# Patient Record
Sex: MALE | Race: WHITE | Employment: FULL TIME | URBAN - NONMETROPOLITAN AREA
[De-identification: names, ages, dates, MRNs, and addresses within clinical notes are randomized per-mention and may not be internally consistent; named-entity substitution may affect disease eponyms.]

---

## 2017-05-22 ENCOUNTER — OFFICE VISIT (OUTPATIENT)
Dept: FAMILY MEDICINE CLINIC | Age: 62
End: 2017-05-22

## 2017-05-22 VITALS
SYSTOLIC BLOOD PRESSURE: 124 MMHG | WEIGHT: 146 LBS | HEIGHT: 65 IN | DIASTOLIC BLOOD PRESSURE: 86 MMHG | OXYGEN SATURATION: 96 % | HEART RATE: 60 BPM | BODY MASS INDEX: 24.32 KG/M2

## 2017-05-22 DIAGNOSIS — Z11.59 NEED FOR HEPATITIS C SCREENING TEST: ICD-10-CM

## 2017-05-22 DIAGNOSIS — I10 ESSENTIAL HYPERTENSION: Primary | ICD-10-CM

## 2017-05-22 DIAGNOSIS — E78.5 HYPERLIPIDEMIA LDL GOAL <130: ICD-10-CM

## 2017-05-22 PROCEDURE — 36415 COLL VENOUS BLD VENIPUNCTURE: CPT | Performed by: FAMILY MEDICINE

## 2017-05-22 PROCEDURE — 99213 OFFICE O/P EST LOW 20 MIN: CPT | Performed by: FAMILY MEDICINE

## 2017-05-22 RX ORDER — HYDROCHLOROTHIAZIDE 25 MG/1
TABLET ORAL
Qty: 90 TABLET | Refills: 1 | Status: SHIPPED | OUTPATIENT
Start: 2017-05-22 | End: 2017-11-17 | Stop reason: SDUPTHER

## 2017-05-22 RX ORDER — PRAVASTATIN SODIUM 40 MG
TABLET ORAL
Qty: 90 TABLET | Refills: 1 | Status: SHIPPED | OUTPATIENT
Start: 2017-05-22 | End: 2017-11-17 | Stop reason: SDUPTHER

## 2017-05-22 ASSESSMENT — PATIENT HEALTH QUESTIONNAIRE - PHQ9
2. FEELING DOWN, DEPRESSED OR HOPELESS: 0
SUM OF ALL RESPONSES TO PHQ QUESTIONS 1-9: 0
SUM OF ALL RESPONSES TO PHQ9 QUESTIONS 1 & 2: 0
1. LITTLE INTEREST OR PLEASURE IN DOING THINGS: 0

## 2017-05-22 ASSESSMENT — ENCOUNTER SYMPTOMS
RESPIRATORY NEGATIVE: 1
GASTROINTESTINAL NEGATIVE: 1

## 2017-05-23 LAB
A/G RATIO: 1.8 (ref 1.1–2.2)
ALBUMIN SERPL-MCNC: 4.6 G/DL (ref 3.4–5)
ALP BLD-CCNC: 70 U/L (ref 40–129)
ALT SERPL-CCNC: 23 U/L (ref 10–40)
ANION GAP SERPL CALCULATED.3IONS-SCNC: 16 MMOL/L (ref 3–16)
AST SERPL-CCNC: 20 U/L (ref 15–37)
BILIRUB SERPL-MCNC: 0.3 MG/DL (ref 0–1)
BUN BLDV-MCNC: 20 MG/DL (ref 7–20)
CALCIUM SERPL-MCNC: 9.9 MG/DL (ref 8.3–10.6)
CHLORIDE BLD-SCNC: 102 MMOL/L (ref 99–110)
CHOLESTEROL, TOTAL: 157 MG/DL (ref 0–199)
CO2: 26 MMOL/L (ref 21–32)
CREAT SERPL-MCNC: 0.8 MG/DL (ref 0.8–1.3)
GFR AFRICAN AMERICAN: >60
GFR NON-AFRICAN AMERICAN: >60
GLOBULIN: 2.5 G/DL
GLUCOSE BLD-MCNC: 92 MG/DL (ref 70–99)
HDLC SERPL-MCNC: 47 MG/DL (ref 40–60)
HEPATITIS C ANTIBODY INTERPRETATION: NORMAL
LDL CHOLESTEROL CALCULATED: 93 MG/DL
POTASSIUM SERPL-SCNC: 4.9 MMOL/L (ref 3.5–5.1)
SODIUM BLD-SCNC: 144 MMOL/L (ref 136–145)
TOTAL PROTEIN: 7.1 G/DL (ref 6.4–8.2)
TRIGL SERPL-MCNC: 84 MG/DL (ref 0–150)
VLDLC SERPL CALC-MCNC: 17 MG/DL

## 2017-09-05 ENCOUNTER — OFFICE VISIT (OUTPATIENT)
Dept: FAMILY MEDICINE CLINIC | Age: 62
End: 2017-09-05

## 2017-09-05 VITALS
TEMPERATURE: 97.1 F | OXYGEN SATURATION: 96 % | WEIGHT: 148 LBS | SYSTOLIC BLOOD PRESSURE: 108 MMHG | DIASTOLIC BLOOD PRESSURE: 64 MMHG | HEIGHT: 65 IN | BODY MASS INDEX: 24.66 KG/M2 | HEART RATE: 70 BPM

## 2017-09-05 DIAGNOSIS — L03.113 CELLULITIS OF RIGHT UPPER EXTREMITY: Primary | ICD-10-CM

## 2017-09-05 PROCEDURE — 99213 OFFICE O/P EST LOW 20 MIN: CPT | Performed by: FAMILY MEDICINE

## 2017-09-05 PROCEDURE — 96372 THER/PROPH/DIAG INJ SC/IM: CPT | Performed by: FAMILY MEDICINE

## 2017-09-05 RX ORDER — CEFTRIAXONE SODIUM 250 MG/1
250 INJECTION, POWDER, FOR SOLUTION INTRAMUSCULAR; INTRAVENOUS ONCE
Status: COMPLETED | OUTPATIENT
Start: 2017-09-05 | End: 2017-09-05

## 2017-09-05 RX ORDER — CEPHALEXIN 500 MG/1
500 CAPSULE ORAL 4 TIMES DAILY
Qty: 28 CAPSULE | Refills: 0 | Status: SHIPPED | OUTPATIENT
Start: 2017-09-05 | End: 2017-09-26 | Stop reason: ALTCHOICE

## 2017-09-05 RX ADMIN — CEFTRIAXONE SODIUM 250 MG: 250 INJECTION, POWDER, FOR SOLUTION INTRAMUSCULAR; INTRAVENOUS at 18:49

## 2017-09-26 ENCOUNTER — OFFICE VISIT (OUTPATIENT)
Dept: FAMILY MEDICINE CLINIC | Age: 62
End: 2017-09-26

## 2017-09-26 VITALS
HEIGHT: 65 IN | BODY MASS INDEX: 24.66 KG/M2 | SYSTOLIC BLOOD PRESSURE: 108 MMHG | HEART RATE: 76 BPM | DIASTOLIC BLOOD PRESSURE: 60 MMHG | TEMPERATURE: 98.1 F | WEIGHT: 148 LBS | OXYGEN SATURATION: 96 %

## 2017-09-26 DIAGNOSIS — L03.211 CELLULITIS OF FACE: Primary | ICD-10-CM

## 2017-09-26 PROCEDURE — 99213 OFFICE O/P EST LOW 20 MIN: CPT | Performed by: FAMILY MEDICINE

## 2017-09-26 RX ORDER — AMOXICILLIN AND CLAVULANATE POTASSIUM 875; 125 MG/1; MG/1
1 TABLET, FILM COATED ORAL 2 TIMES DAILY
Qty: 14 TABLET | Refills: 0 | Status: SHIPPED | OUTPATIENT
Start: 2017-09-26 | End: 2017-10-03

## 2017-09-26 ASSESSMENT — ENCOUNTER SYMPTOMS
SORE THROAT: 0
RESPIRATORY NEGATIVE: 1
EYE REDNESS: 1

## 2017-11-17 ENCOUNTER — OFFICE VISIT (OUTPATIENT)
Dept: FAMILY MEDICINE CLINIC | Age: 62
End: 2017-11-17

## 2017-11-17 VITALS
HEIGHT: 65 IN | SYSTOLIC BLOOD PRESSURE: 130 MMHG | TEMPERATURE: 97.3 F | WEIGHT: 148.6 LBS | DIASTOLIC BLOOD PRESSURE: 80 MMHG | OXYGEN SATURATION: 91 % | HEART RATE: 31 BPM | BODY MASS INDEX: 24.76 KG/M2

## 2017-11-17 DIAGNOSIS — Z23 NEED FOR SHINGLES VACCINE: ICD-10-CM

## 2017-11-17 DIAGNOSIS — Z23 NEED FOR INFLUENZA VACCINATION: ICD-10-CM

## 2017-11-17 DIAGNOSIS — E78.5 HYPERLIPIDEMIA LDL GOAL <130: ICD-10-CM

## 2017-11-17 DIAGNOSIS — I10 ESSENTIAL HYPERTENSION: Primary | ICD-10-CM

## 2017-11-17 PROCEDURE — 90471 IMMUNIZATION ADMIN: CPT | Performed by: FAMILY MEDICINE

## 2017-11-17 PROCEDURE — 99213 OFFICE O/P EST LOW 20 MIN: CPT | Performed by: FAMILY MEDICINE

## 2017-11-17 PROCEDURE — 90688 IIV4 VACCINE SPLT 0.5 ML IM: CPT | Performed by: FAMILY MEDICINE

## 2017-11-17 RX ORDER — HYDROCHLOROTHIAZIDE 25 MG/1
TABLET ORAL
Qty: 90 TABLET | Refills: 1 | Status: SHIPPED | OUTPATIENT
Start: 2017-11-17 | End: 2018-04-13 | Stop reason: SDUPTHER

## 2017-11-17 RX ORDER — PRAVASTATIN SODIUM 40 MG
TABLET ORAL
Qty: 90 TABLET | Refills: 1 | Status: SHIPPED | OUTPATIENT
Start: 2017-11-17 | End: 2018-04-13 | Stop reason: SDUPTHER

## 2017-11-17 ASSESSMENT — ENCOUNTER SYMPTOMS: GASTROINTESTINAL NEGATIVE: 1

## 2017-11-17 NOTE — PROGRESS NOTES
Subjective:      Patient ID: Chris Estrada is a 58 y.o. male. HPI  Chief Complaint   Patient presents with    Hypertension-Denies cv/cns/jorge sx      6 month f/u     Hyperlipidemia-no problem with compliance;no myalgias;      fasting except coffee     Unaccompanied for routine follow-up. Review of Systems   Constitutional: Negative. Cardiovascular: Negative. Gastrointestinal: Negative. Genitourinary: Negative. History of prostate cancer       Objective:   Physical Exam   Constitutional: He appears well-developed and well-nourished. Neck: Neck supple. Carotid bruit is not present. Cardiovascular: Normal rate, regular rhythm and normal heart sounds. Pulmonary/Chest: Effort normal. He has wheezes. Lymphadenopathy:     He has no cervical adenopathy. Nursing note and vitals reviewed. /80   Pulse (!) 31   Temp 97.3 °F (36.3 °C) (Oral)   Ht 5' 5\" (1.651 m)   Wt 148 lb 9.6 oz (67.4 kg)   SpO2 91%   BMI 24.73 kg/m²     Assessment:      Ant Metcalf was seen today for hypertension and hyperlipidemia. Diagnoses and all orders for this visit:    Essential hypertension  -     hydrochlorothiazide (HYDRODIURIL) 25 MG tablet; TAKE 1 TABLET DAILY    Hyperlipidemia LDL goal <130  -     pravastatin (PRAVACHOL) 40 MG tablet; TAKE 1 TABLET DAILY    Need for influenza vaccination  -     INFLUENZA, QUADV, 3 YRS AND OLDER, IM, MDV, 0.5ML (FLUZONE QUADV)    Need for shingles vaccine  -     zoster vaccine live, PF, (ZOSTAVAX) 37259 UNT/0.65ML injection; Inject 0.65 mLs into the skin once for 1 dose           Plan:      Return in about 6 months (around 5/17/2018). \  There are no Patient Instructions on file for this visit.

## 2018-03-15 ENCOUNTER — HOSPITAL ENCOUNTER (OUTPATIENT)
Dept: OTHER | Age: 63
Discharge: HOME | End: 2018-03-15
Attending: UROLOGY | Admitting: UROLOGY

## 2018-03-15 LAB — PROSTATE SPECIFIC ANTIGEN: <0.01 NG/ML (ref 0–4)

## 2018-04-13 ENCOUNTER — OFFICE VISIT (OUTPATIENT)
Dept: FAMILY MEDICINE CLINIC | Age: 63
End: 2018-04-13

## 2018-04-13 VITALS
WEIGHT: 148.2 LBS | BODY MASS INDEX: 24.69 KG/M2 | SYSTOLIC BLOOD PRESSURE: 130 MMHG | HEART RATE: 60 BPM | TEMPERATURE: 98.5 F | DIASTOLIC BLOOD PRESSURE: 70 MMHG | HEIGHT: 65 IN | OXYGEN SATURATION: 94 %

## 2018-04-13 DIAGNOSIS — E78.5 HYPERLIPIDEMIA LDL GOAL <130: ICD-10-CM

## 2018-04-13 DIAGNOSIS — I10 ESSENTIAL HYPERTENSION: Primary | ICD-10-CM

## 2018-04-13 PROCEDURE — 36415 COLL VENOUS BLD VENIPUNCTURE: CPT | Performed by: FAMILY MEDICINE

## 2018-04-13 PROCEDURE — 99213 OFFICE O/P EST LOW 20 MIN: CPT | Performed by: FAMILY MEDICINE

## 2018-04-13 RX ORDER — PRAVASTATIN SODIUM 40 MG
TABLET ORAL
Qty: 90 TABLET | Refills: 1 | Status: SHIPPED | OUTPATIENT
Start: 2018-04-13 | End: 2018-09-28 | Stop reason: SDUPTHER

## 2018-04-13 RX ORDER — HYDROCHLOROTHIAZIDE 25 MG/1
TABLET ORAL
Qty: 90 TABLET | Refills: 1 | Status: SHIPPED | OUTPATIENT
Start: 2018-04-13 | End: 2018-09-28 | Stop reason: SDUPTHER

## 2018-04-13 ASSESSMENT — ENCOUNTER SYMPTOMS: RESPIRATORY NEGATIVE: 1

## 2018-04-14 LAB
A/G RATIO: 2 (ref 1.1–2.2)
ALBUMIN SERPL-MCNC: 4.7 G/DL (ref 3.4–5)
ALP BLD-CCNC: 83 U/L (ref 40–129)
ALT SERPL-CCNC: 28 U/L (ref 10–40)
ANION GAP SERPL CALCULATED.3IONS-SCNC: 13 MMOL/L (ref 3–16)
AST SERPL-CCNC: 21 U/L (ref 15–37)
BILIRUB SERPL-MCNC: 0.3 MG/DL (ref 0–1)
BUN BLDV-MCNC: 23 MG/DL (ref 7–20)
CALCIUM SERPL-MCNC: 9.6 MG/DL (ref 8.3–10.6)
CHLORIDE BLD-SCNC: 99 MMOL/L (ref 99–110)
CHOLESTEROL, TOTAL: 139 MG/DL (ref 0–199)
CO2: 28 MMOL/L (ref 21–32)
CREAT SERPL-MCNC: 0.9 MG/DL (ref 0.8–1.3)
GFR AFRICAN AMERICAN: >60
GFR NON-AFRICAN AMERICAN: >60
GLOBULIN: 2.4 G/DL
GLUCOSE BLD-MCNC: 103 MG/DL (ref 70–99)
HDLC SERPL-MCNC: 44 MG/DL (ref 40–60)
LDL CHOLESTEROL CALCULATED: 73 MG/DL
POTASSIUM SERPL-SCNC: 4.5 MMOL/L (ref 3.5–5.1)
SODIUM BLD-SCNC: 140 MMOL/L (ref 136–145)
TOTAL PROTEIN: 7.1 G/DL (ref 6.4–8.2)
TRIGL SERPL-MCNC: 108 MG/DL (ref 0–150)
VLDLC SERPL CALC-MCNC: 22 MG/DL

## 2018-05-21 DIAGNOSIS — Z12.11 SCREENING FOR COLON CANCER: Primary | ICD-10-CM

## 2018-09-28 ENCOUNTER — OFFICE VISIT (OUTPATIENT)
Dept: FAMILY MEDICINE CLINIC | Age: 63
End: 2018-09-28
Payer: COMMERCIAL

## 2018-09-28 VITALS
WEIGHT: 147.4 LBS | DIASTOLIC BLOOD PRESSURE: 80 MMHG | TEMPERATURE: 98.4 F | BODY MASS INDEX: 24.53 KG/M2 | SYSTOLIC BLOOD PRESSURE: 124 MMHG | OXYGEN SATURATION: 96 % | HEART RATE: 65 BPM

## 2018-09-28 DIAGNOSIS — E78.5 HYPERLIPIDEMIA LDL GOAL <130: ICD-10-CM

## 2018-09-28 DIAGNOSIS — I10 ESSENTIAL HYPERTENSION: ICD-10-CM

## 2018-09-28 DIAGNOSIS — Z23 NEED FOR INFLUENZA VACCINATION: Primary | ICD-10-CM

## 2018-09-28 PROCEDURE — 90682 RIV4 VACC RECOMBINANT DNA IM: CPT | Performed by: FAMILY MEDICINE

## 2018-09-28 PROCEDURE — 90471 IMMUNIZATION ADMIN: CPT | Performed by: FAMILY MEDICINE

## 2018-09-28 PROCEDURE — 99213 OFFICE O/P EST LOW 20 MIN: CPT | Performed by: FAMILY MEDICINE

## 2018-09-28 RX ORDER — PRAVASTATIN SODIUM 40 MG
TABLET ORAL
Qty: 90 TABLET | Refills: 1 | Status: SHIPPED | OUTPATIENT
Start: 2018-09-28 | End: 2019-03-22 | Stop reason: SDUPTHER

## 2018-09-28 RX ORDER — HYDROCHLOROTHIAZIDE 25 MG/1
TABLET ORAL
Qty: 90 TABLET | Refills: 1 | Status: SHIPPED | OUTPATIENT
Start: 2018-09-28 | End: 2019-03-22 | Stop reason: SDUPTHER

## 2018-09-28 ASSESSMENT — PATIENT HEALTH QUESTIONNAIRE - PHQ9
SUM OF ALL RESPONSES TO PHQ QUESTIONS 1-9: 0
2. FEELING DOWN, DEPRESSED OR HOPELESS: 0
SUM OF ALL RESPONSES TO PHQ9 QUESTIONS 1 & 2: 0
SUM OF ALL RESPONSES TO PHQ QUESTIONS 1-9: 0
1. LITTLE INTEREST OR PLEASURE IN DOING THINGS: 0

## 2018-09-28 ASSESSMENT — ENCOUNTER SYMPTOMS: RESPIRATORY NEGATIVE: 1

## 2018-09-28 NOTE — PROGRESS NOTES
Vaccine Information Sheet, \"Influenza - Inactivated\"  given to [place] , or parent/legal guardian of  [place]  and verbalized understanding. Patient responses:    Have you ever had a reaction to a flu vaccine? No  Are you able to eat eggs without adverse effects? Yes  Do you have any current illness? No  Have you ever had Guillian [place] Syndrome? No    Flu vaccine given per order. Please see immunization tab.

## 2018-09-28 NOTE — PROGRESS NOTES
Subjective:      Patient ID: Flower Becker is a 61 y.o. male. HPI  Chief Complaint   Patient presents with    Hyperlipidemia-no problem with compliance;no myalgias;      is fasting     Hypertension= Denies cv/cns/jorge sx      6 month f/u     Flu Vaccine     Chief complaint and present illness: 77-year-old white male presents unaccompanied for routine follow-up. Doing well     Review of Systems   Constitutional: Negative. Respiratory: Negative. background/entire past medical,social and family history obtained and reviewed/updated today   Objective:   Physical Exam   Constitutional: He appears well-developed and well-nourished. Neck: Neck supple. Carotid bruit is not present. Cardiovascular: Normal rate, regular rhythm and normal heart sounds. Pulmonary/Chest: Effort normal. He has no wheezes. Lymphadenopathy:     He has no cervical adenopathy. Nursing note and vitals reviewed. /80   Pulse 65   Temp 98.4 °F (36.9 °C) (Oral)   Wt 147 lb 6.4 oz (66.9 kg)   SpO2 96%   BMI 24.53 kg/m²     Assessment:      Macho Churchill was seen today for hyperlipidemia, hypertension and flu vaccine. Diagnoses and all orders for this visit:    Need for influenza vaccination  -     INFLUENZA, QUADV, RECOMBINANT, 18 YRS AND OLDER, IM, PF, PREFILL SYR OR SDV, 0.5ML (FLUBLOK QUADV, PF)    Hyperlipidemia LDL goal <130  -     pravastatin (PRAVACHOL) 40 MG tablet; TAKE 1 TABLET DAILY    Essential hypertension  -     hydrochlorothiazide (HYDRODIURIL) 25 MG tablet; TAKE 1 TABLET DAILY           Plan:      Return in about 6 months (around 3/28/2019). There are no Patient Instructions on file for this visit.          Shyann Boyer

## 2019-03-22 ENCOUNTER — TELEPHONE (OUTPATIENT)
Dept: FAMILY MEDICINE CLINIC | Age: 64
End: 2019-03-22

## 2019-03-22 ENCOUNTER — OFFICE VISIT (OUTPATIENT)
Dept: FAMILY MEDICINE CLINIC | Age: 64
End: 2019-03-22
Payer: COMMERCIAL

## 2019-03-22 VITALS
OXYGEN SATURATION: 96 % | DIASTOLIC BLOOD PRESSURE: 74 MMHG | WEIGHT: 145 LBS | SYSTOLIC BLOOD PRESSURE: 122 MMHG | HEART RATE: 50 BPM | BODY MASS INDEX: 24.13 KG/M2

## 2019-03-22 DIAGNOSIS — E78.5 HYPERLIPIDEMIA LDL GOAL <130: ICD-10-CM

## 2019-03-22 DIAGNOSIS — I10 ESSENTIAL HYPERTENSION: ICD-10-CM

## 2019-03-22 DIAGNOSIS — I49.9 CARDIAC ARRHYTHMIA, UNSPECIFIED CARDIAC ARRHYTHMIA TYPE: Primary | ICD-10-CM

## 2019-03-22 LAB
A/G RATIO: 1.7 (ref 1.1–2.2)
ALBUMIN SERPL-MCNC: 4.2 G/DL (ref 3.4–5)
ALP BLD-CCNC: 81 U/L (ref 40–129)
ALT SERPL-CCNC: 29 U/L (ref 10–40)
ANION GAP SERPL CALCULATED.3IONS-SCNC: 11 MMOL/L (ref 3–16)
AST SERPL-CCNC: 28 U/L (ref 15–37)
BILIRUB SERPL-MCNC: 0.4 MG/DL (ref 0–1)
BUN BLDV-MCNC: 13 MG/DL (ref 7–20)
CALCIUM SERPL-MCNC: 9.7 MG/DL (ref 8.3–10.6)
CHLORIDE BLD-SCNC: 104 MMOL/L (ref 99–110)
CHOLESTEROL, TOTAL: 109 MG/DL (ref 0–199)
CO2: 27 MMOL/L (ref 21–32)
CREAT SERPL-MCNC: 0.8 MG/DL (ref 0.8–1.3)
GFR AFRICAN AMERICAN: >60
GFR NON-AFRICAN AMERICAN: >60
GLOBULIN: 2.5 G/DL
GLUCOSE BLD-MCNC: 115 MG/DL (ref 70–99)
HDLC SERPL-MCNC: 43 MG/DL (ref 40–60)
LDL CHOLESTEROL CALCULATED: 51 MG/DL
MAGNESIUM: 2.1 MG/DL (ref 1.8–2.4)
POTASSIUM SERPL-SCNC: 4.8 MMOL/L (ref 3.5–5.1)
SODIUM BLD-SCNC: 142 MMOL/L (ref 136–145)
TOTAL PROTEIN: 6.7 G/DL (ref 6.4–8.2)
TRIGL SERPL-MCNC: 73 MG/DL (ref 0–150)
VLDLC SERPL CALC-MCNC: 15 MG/DL

## 2019-03-22 PROCEDURE — 99214 OFFICE O/P EST MOD 30 MIN: CPT | Performed by: FAMILY MEDICINE

## 2019-03-22 PROCEDURE — 93000 ELECTROCARDIOGRAM COMPLETE: CPT | Performed by: FAMILY MEDICINE

## 2019-03-22 RX ORDER — HYDROCHLOROTHIAZIDE 25 MG/1
TABLET ORAL
Qty: 90 TABLET | Refills: 1 | Status: SHIPPED | OUTPATIENT
Start: 2019-03-22 | End: 2019-09-19 | Stop reason: SDUPTHER

## 2019-03-22 RX ORDER — PRAVASTATIN SODIUM 40 MG
TABLET ORAL
Qty: 90 TABLET | Refills: 1 | Status: SHIPPED | OUTPATIENT
Start: 2019-03-22 | End: 2019-09-19 | Stop reason: SDUPTHER

## 2019-03-22 ASSESSMENT — PATIENT HEALTH QUESTIONNAIRE - PHQ9
SUM OF ALL RESPONSES TO PHQ QUESTIONS 1-9: 0
1. LITTLE INTEREST OR PLEASURE IN DOING THINGS: 0
SUM OF ALL RESPONSES TO PHQ QUESTIONS 1-9: 0
SUM OF ALL RESPONSES TO PHQ9 QUESTIONS 1 & 2: 0
2. FEELING DOWN, DEPRESSED OR HOPELESS: 0

## 2019-03-22 ASSESSMENT — ENCOUNTER SYMPTOMS: RESPIRATORY NEGATIVE: 1

## 2019-03-25 ENCOUNTER — OFFICE VISIT (OUTPATIENT)
Dept: CARDIOLOGY CLINIC | Age: 64
End: 2019-03-25
Payer: COMMERCIAL

## 2019-03-25 ENCOUNTER — HOSPITAL ENCOUNTER (OUTPATIENT)
Age: 64
Discharge: HOME | End: 2019-03-25
Payer: COMMERCIAL

## 2019-03-25 VITALS
WEIGHT: 142.4 LBS | BODY MASS INDEX: 23.72 KG/M2 | OXYGEN SATURATION: 95 % | HEART RATE: 78 BPM | HEIGHT: 65 IN | SYSTOLIC BLOOD PRESSURE: 120 MMHG | DIASTOLIC BLOOD PRESSURE: 60 MMHG

## 2019-03-25 DIAGNOSIS — I49.9 IRREGULAR HEART RATE: ICD-10-CM

## 2019-03-25 DIAGNOSIS — I49.3 PVC (PREMATURE VENTRICULAR CONTRACTION): Primary | ICD-10-CM

## 2019-03-25 DIAGNOSIS — I49.3 PVC (PREMATURE VENTRICULAR CONTRACTION): ICD-10-CM

## 2019-03-25 LAB — TSH REFLEX FT4: 0.38 UIU/ML (ref 0.27–4.2)

## 2019-03-25 PROCEDURE — 93000 ELECTROCARDIOGRAM COMPLETE: CPT | Performed by: INTERNAL MEDICINE

## 2019-03-25 PROCEDURE — 99203 OFFICE O/P NEW LOW 30 MIN: CPT | Performed by: INTERNAL MEDICINE

## 2019-03-25 PROCEDURE — 84443 ASSAY THYROID STIM HORMONE: CPT

## 2019-03-25 PROCEDURE — 36415 COLL VENOUS BLD VENIPUNCTURE: CPT

## 2019-03-25 RX ORDER — ASPIRIN 325 MG
325 TABLET ORAL DAILY
COMMUNITY

## 2019-03-29 ENCOUNTER — TELEPHONE (OUTPATIENT)
Dept: CARDIOLOGY CLINIC | Age: 64
End: 2019-03-29

## 2019-04-03 ENCOUNTER — TELEPHONE (OUTPATIENT)
Dept: CARDIOLOGY CLINIC | Age: 64
End: 2019-04-03

## 2019-04-08 PROCEDURE — 93224 XTRNL ECG REC UP TO 48 HRS: CPT | Performed by: INTERNAL MEDICINE

## 2019-04-09 ENCOUNTER — HOSPITAL ENCOUNTER (OUTPATIENT)
Dept: NON INVASIVE DIAGNOSTICS | Age: 64
Discharge: HOME | End: 2019-04-09
Payer: COMMERCIAL

## 2019-04-09 DIAGNOSIS — I49.3 PVC (PREMATURE VENTRICULAR CONTRACTION): ICD-10-CM

## 2019-04-09 LAB
LV EF: 48 %
LVEF MODALITY: NORMAL

## 2019-04-09 PROCEDURE — 93306 TTE W/DOPPLER COMPLETE: CPT

## 2019-04-11 ENCOUNTER — TELEPHONE (OUTPATIENT)
Dept: CARDIOLOGY CLINIC | Age: 64
End: 2019-04-11

## 2019-04-11 RX ORDER — NADOLOL 20 MG/1
20 TABLET ORAL 2 TIMES DAILY
Qty: 60 TABLET | Refills: 5 | Status: SHIPPED | OUTPATIENT
Start: 2019-04-11 | End: 2019-11-15 | Stop reason: SDUPTHER

## 2019-04-11 NOTE — TELEPHONE ENCOUNTER
----- Message from Danita Millan MD sent at 4/11/2019  4:41 PM EDT -----  Shireen Bufysannika to call patient, gave results. States no CP. Feels fine. Please call pt and schedule for cardiac cath to evaluate further.  I already went over R,B,Aes with pt

## 2019-04-11 NOTE — TELEPHONE ENCOUNTER
Spoke with Costa Dye, relayed message per Raissa Guzman. Pt verbalized understanding and will schedule cath. Keyonna please call and set up cath. Thanks.

## 2019-04-12 NOTE — TELEPHONE ENCOUNTER
Left message for patient to call us back. Patient can hold the HCTZ if this makes him use the bathroom more than normal.  If it does not effect him, he does not have to hold it. He may take all other meds with a sip of water as well.

## 2019-04-19 DIAGNOSIS — I49.3 PVC (PREMATURE VENTRICULAR CONTRACTION): ICD-10-CM

## 2019-04-23 ENCOUNTER — HOSPITAL ENCOUNTER (OUTPATIENT)
Dept: CARDIAC CATH/INVASIVE PROCEDURES | Age: 64
Discharge: HOME | End: 2019-04-23
Attending: INTERNAL MEDICINE | Admitting: INTERNAL MEDICINE
Payer: COMMERCIAL

## 2019-04-23 VITALS — HEIGHT: 65 IN | WEIGHT: 142 LBS | BODY MASS INDEX: 23.66 KG/M2

## 2019-04-23 LAB
ALBUMIN SERPL-MCNC: 4.6 G/DL (ref 3.4–5)
ANION GAP SERPL CALCULATED.3IONS-SCNC: 11 MMOL/L (ref 3–16)
BUN BLDV-MCNC: 21 MG/DL (ref 7–20)
CALCIUM SERPL-MCNC: 9.8 MG/DL (ref 8.3–10.6)
CHLORIDE BLD-SCNC: 102 MMOL/L (ref 99–110)
CHOLESTEROL, FASTING: 109 MG/DL (ref 0–199)
CO2: 29 MMOL/L (ref 21–32)
CREAT SERPL-MCNC: 0.8 MG/DL (ref 0.8–1.3)
EKG ATRIAL RATE: 50 BPM
EKG DIAGNOSIS: NORMAL
EKG P AXIS: 65 DEGREES
EKG P-R INTERVAL: 142 MS
EKG Q-T INTERVAL: 430 MS
EKG QRS DURATION: 100 MS
EKG QTC CALCULATION (BAZETT): 392 MS
EKG R AXIS: 73 DEGREES
EKG T AXIS: 49 DEGREES
EKG VENTRICULAR RATE: 50 BPM
GFR AFRICAN AMERICAN: >60
GFR NON-AFRICAN AMERICAN: >60
GLUCOSE BLD-MCNC: 115 MG/DL (ref 70–99)
HCT VFR BLD CALC: 44.4 % (ref 40.5–52.5)
HDLC SERPL-MCNC: 35 MG/DL (ref 40–60)
HEMOGLOBIN: 15.3 G/DL (ref 13.5–17.5)
INR BLD: 1.08 (ref 0.86–1.14)
LDL CHOLESTEROL CALCULATED: 57 MG/DL
MCH RBC QN AUTO: 32 PG (ref 26–34)
MCHC RBC AUTO-ENTMCNC: 34.4 G/DL (ref 31–36)
MCV RBC AUTO: 93.2 FL (ref 80–100)
PDW BLD-RTO: 13.7 % (ref 12.4–15.4)
PHOSPHORUS: 4.8 MG/DL (ref 2.5–4.9)
PLATELET # BLD: 271 K/UL (ref 135–450)
PMV BLD AUTO: 7.4 FL (ref 5–10.5)
POTASSIUM SERPL-SCNC: 4.1 MMOL/L (ref 3.5–5.1)
PROTHROMBIN TIME: 12.3 SEC (ref 9.8–13)
RBC # BLD: 4.76 M/UL (ref 4.2–5.9)
SODIUM BLD-SCNC: 142 MMOL/L (ref 136–145)
TRIGLYCERIDE, FASTING: 85 MG/DL (ref 0–150)
VLDLC SERPL CALC-MCNC: 17 MG/DL
WBC # BLD: 6.1 K/UL (ref 4–11)

## 2019-04-23 PROCEDURE — 2580000003 HC RX 258

## 2019-04-23 PROCEDURE — 99153 MOD SED SAME PHYS/QHP EA: CPT

## 2019-04-23 PROCEDURE — 99152 MOD SED SAME PHYS/QHP 5/>YRS: CPT

## 2019-04-23 PROCEDURE — 6360000002 HC RX W HCPCS

## 2019-04-23 PROCEDURE — C1894 INTRO/SHEATH, NON-LASER: HCPCS

## 2019-04-23 PROCEDURE — 80069 RENAL FUNCTION PANEL: CPT

## 2019-04-23 PROCEDURE — C1769 GUIDE WIRE: HCPCS

## 2019-04-23 PROCEDURE — 2709999900 HC NON-CHARGEABLE SUPPLY

## 2019-04-23 PROCEDURE — 99152 MOD SED SAME PHYS/QHP 5/>YRS: CPT | Performed by: INTERNAL MEDICINE

## 2019-04-23 PROCEDURE — 93005 ELECTROCARDIOGRAM TRACING: CPT | Performed by: INTERNAL MEDICINE

## 2019-04-23 PROCEDURE — 6360000004 HC RX CONTRAST MEDICATION

## 2019-04-23 PROCEDURE — 93458 L HRT ARTERY/VENTRICLE ANGIO: CPT

## 2019-04-23 PROCEDURE — 80061 LIPID PANEL: CPT

## 2019-04-23 PROCEDURE — 93010 ELECTROCARDIOGRAM REPORT: CPT | Performed by: INTERNAL MEDICINE

## 2019-04-23 PROCEDURE — 93458 L HRT ARTERY/VENTRICLE ANGIO: CPT | Performed by: INTERNAL MEDICINE

## 2019-04-23 PROCEDURE — C1887 CATHETER, GUIDING: HCPCS

## 2019-04-23 PROCEDURE — 85027 COMPLETE CBC AUTOMATED: CPT

## 2019-04-23 PROCEDURE — 6370000000 HC RX 637 (ALT 250 FOR IP)

## 2019-04-23 PROCEDURE — 99153 MOD SED SAME PHYS/QHP EA: CPT | Performed by: INTERNAL MEDICINE

## 2019-04-23 PROCEDURE — 2500000003 HC RX 250 WO HCPCS

## 2019-04-23 PROCEDURE — 85610 PROTHROMBIN TIME: CPT

## 2019-04-23 RX ORDER — ACETAMINOPHEN 325 MG/1
650 TABLET ORAL ONCE
Status: DISCONTINUED | OUTPATIENT
Start: 2019-04-23 | End: 2019-04-23 | Stop reason: HOSPADM

## 2019-04-23 RX ORDER — SODIUM CHLORIDE 9 MG/ML
INJECTION, SOLUTION INTRAVENOUS ONCE
Status: COMPLETED | OUTPATIENT
Start: 2019-04-23 | End: 2019-04-23

## 2019-04-23 RX ORDER — MIDAZOLAM HYDROCHLORIDE 1 MG/ML
INJECTION INTRAMUSCULAR; INTRAVENOUS
Status: COMPLETED | OUTPATIENT
Start: 2019-04-23 | End: 2019-04-23

## 2019-04-23 RX ORDER — FENTANYL CITRATE 50 UG/ML
INJECTION, SOLUTION INTRAMUSCULAR; INTRAVENOUS
Status: COMPLETED | OUTPATIENT
Start: 2019-04-23 | End: 2019-04-23

## 2019-04-23 RX ORDER — ISOSORBIDE MONONITRATE 30 MG/1
30 TABLET, EXTENDED RELEASE ORAL DAILY
Qty: 30 TABLET | Refills: 3 | Status: SHIPPED | OUTPATIENT
Start: 2019-04-23 | End: 2019-09-12 | Stop reason: SDUPTHER

## 2019-04-23 RX ADMIN — MIDAZOLAM HYDROCHLORIDE 2 MG: 1 INJECTION INTRAMUSCULAR; INTRAVENOUS at 13:52

## 2019-04-23 RX ADMIN — SODIUM CHLORIDE: 9 INJECTION, SOLUTION INTRAVENOUS at 11:00

## 2019-04-23 RX ADMIN — FENTANYL CITRATE 25 MCG: 50 INJECTION, SOLUTION INTRAMUSCULAR; INTRAVENOUS at 13:52

## 2019-04-23 NOTE — BRIEF OP NOTE
Brief Postoperative Note  ______________________________________________________________    Patient: Phuong Dave  YOB: 1955  MRN: [id number]      Brief Postoperative Note  Pre-operative Diagnosis: PVC, abnormal echo  Post-operative Diagnosis: Same  Procedure:   LHC  LVG  Cors  Anesthesia: Moderate Sedation  Surgeons/Assistants: Yolanda Ennis MD, Nell Escobar  Estimated Blood Loss: less than 50   Complications: None  Specimens: Was Not Obtained    Findings:     LEFT HEART CATH  LM: no angiographic CAD  LAD: luminals  LCX: No angiographic CAD, JUSTIN-2 flow   OM-1 large, branching  RCA: dominant, prox/mid luminals. Justin-2 flow (5 beats to get flow distally, very slow to clear dye)    LVEDP: 10  LVEF: 55%      Assessment  1. Mild nonobstructive CAD  2. Cardiac microvascular disease as above with very poor flow in RCA and LAD. - medical management  3. QUIT smoking!             Alexandro Wiley MD  Date: 4/23/2019  Time: 1:54 PM

## 2019-04-23 NOTE — H&P
Brief Pre-Op Note/Sedation Assessment      Phuong Dave  1955  Cath Pool Rm/NONE  [id number]  12:28 PM    Planned Procedure: Cardiac Catheterization Procedure    Post Procedure Plan: Return to same level of care    Consent: I have discussed with the patient and/or the patient representative the indication, alternatives, and the possible risks and/or complications of the planned procedure and the anesthesia methods. The patient and/or patient representative appear to understand and agree to proceed. Chief Complaint: Anginal Equivalent      Indications for the Procedure:   CAD Presentation:  Cardiac Arrythmia  Anginal Classification within 2 weeks:  CCS II - Slight limitation, with angina only during vigorous physical activity  NYHA Heart Failure Class within 2 weeks: Class III - Symptoms of HF on less-than-ordinary exertion, Newly Diagnosed? Yes, Heart Failure Type: Systolic      Anti- Anginal Meds within 2 weeks:   ANTI-ANGINAL MEDS: Yes: Beta Blockers, Aspirin and Statin (Any)      Stress or Imaging Studies Performed:  None    Vital Signs:  Ht 5' 5\" (1.651 m)   Wt 142 lb (64.4 kg)   BMI 23.63 kg/m²     Allergies:  No Known Allergies    Past Medical History:  Past Medical History:   Diagnosis Date    Hyperlipidemia     Hypertension     Prostate cancer ([facility] [address] 2012    total prostatectomy,DrBey    Smoker          Surgical History:  Past Surgical History:   Procedure Laterality Date    COLONOSCOPY  3/22/12    EYE SURGERY  1989    eye trauma os with fixed pupil;vision is ok;    PROSTATE BIOPSY      PROSTATECTOMY  02/14/2012         Medications:  Current Facility-Administered Medications   Medication Dose Route Frequency Provider Last Rate Last Dose    0.9 % sodium chloride infusion   Intravenous Once Yadira MD Charley               Pre-Sedation:    Pre-Sedation Documentation and Exam:  I have assessed the patient and agree with the H&P present on the chart.     Prior History of Anesthesia Complications:   none    Modified Mallampati:  II (soft palate, uvula, fauces visible)    ASA Classification:  Class 2 -- A normal healthy patient with mild systemic disease    Jair Scale: Activity:  2 - Able to move 4 extremities voluntarily on command  Respiration:  2 - Able to breathe deeply and cough freely  Circulation:  2 - BP+/- 20mmHg of normal  Consciousness:  2 - Fully awake  Oxygen Saturation (color):  2 - Able to maintain oxygen saturation >92% on room air    Sedation/Anesthesia Plan:  Guard the patient's safety and welfare. Minimize physical discomfort and pain. Minimize negative psychological responses to treatment by providing sedation and analgesia and maximize the potential amnesia. Patient to meet pre-procedure discharge plan.     Medication Planned:  midazolam intravenously, fentanyl intravenously    Patient is an appropriate candidate for plan of sedation: yes      Electronically signed by Sona Ibanez MD on 4/23/2019 at 12:28 PM

## 2019-04-29 ENCOUNTER — TELEPHONE (OUTPATIENT)
Dept: CARDIOLOGY CLINIC | Age: 64
End: 2019-04-29

## 2019-04-29 NOTE — TELEPHONE ENCOUNTER
Spoke with patient. He states he believes his Imdur may be causing the flushing and tingling. He states he has been on niacin for \"years\" and has never had any trouble until starting the Imdur. Will let Dr. Angel Hargrove know.

## 2019-05-15 NOTE — PROGRESS NOTES
by mouth daily 30 tablet 3    nadolol (CORGARD) 20 MG tablet Take 1 tablet by mouth 2 times daily 60 tablet 5    aspirin 325 MG tablet Take 325 mg by mouth daily      hydrochlorothiazide (HYDRODIURIL) 25 MG tablet TAKE 1 TABLET DAILY 90 tablet 1    pravastatin (PRAVACHOL) 40 MG tablet TAKE 1 TABLET DAILY 90 tablet 1    niacin 500 MG tablet Take 500 mg by mouth daily. No current facility-administered medications for this visit. Allergies:  Patient has no known allergies. Review of Systems:   A 14 point review of symptoms completed. Pertinent positives identified in the HPI, all other review of symptoms negative as below.     Objective:   PHYSICAL EXAM:    Vitals:    05/16/19 0730   BP: (!) 102/52   Pulse: (!) 48   SpO2: 98%    Weight: 144 lb (65.3 kg)     Wt Readings from Last 3 Encounters:   05/16/19 144 lb (65.3 kg)   04/23/19 142 lb (64.4 kg)   03/25/19 142 lb 6.4 oz (64.6 kg)         General Appearance:  Alert, cooperative, no distress, appears stated age   Head:  Normocephalic, atraumatic   Eyes:  PERRL, conjunctiva/corneas clear   Nose: Nares normal, no drainage or sinus tenderness   Throat: Lips, mucosa, and tongue normal   Neck: Supple, symmetrical, trachea midline, NL thyroid no carotid bruit or JVD   Lungs:   CTAB, respirations unlabored   Chest Wall:  No tenderness or deformity   Heart:  Regular rhythm and normal rate; S1, S2 are normal; +Ectopy  no murmur noted; no rub or gallop   Abdomen:   Soft, non-tender, +BS x 4, no masses, no organomegaly   Extremities: Extremities normal, atraumatic, no cyanosis or edema   Pulses: 2+ and symmetric   Skin: Skin color, texture, turgor normal, no rashes or lesions   Pysch: Normal mood and affect   Neurologic: Normal gross motor and sensory exam.         LABS   CBC:      Lab Results   Component Value Date    WBC 6.1 04/23/2019    RBC 4.76 04/23/2019    HGB 15.3 04/23/2019    HCT 44.4 04/23/2019    MCV 93.2 04/23/2019    RDW 13.7 04/23/2019    PLT 271 04/23/2019     CMP:  Lab Results   Component Value Date     04/23/2019    K 4.1 04/23/2019     04/23/2019    CO2 29 04/23/2019    BUN 21 04/23/2019    CREATININE 0.8 04/23/2019    GFRAA >60 04/23/2019    AGRATIO 1.7 03/22/2019    LABGLOM >60 04/23/2019    GLUCOSE 115 04/23/2019    PROT 6.7 03/22/2019    CALCIUM 9.8 04/23/2019    BILITOT 0.4 03/22/2019    ALKPHOS 81 03/22/2019    AST 28 03/22/2019    ALT 29 03/22/2019     PT/INR:   No results found for: PTINR  Liver:  No components found for: CHLPL  Lab Results   Component Value Date    ALT 29 03/22/2019    AST 28 03/22/2019    ALKPHOS 81 03/22/2019    BILITOT 0.4 03/22/2019     No results found for: LABA1C  Lipids:         Lab Results   Component Value Date    TRIG 73 03/22/2019    TRIG 108 04/13/2018    TRIG 84 05/22/2017            Lab Results   Component Value Date    HDL 35 (L) 04/23/2019    HDL 43 03/22/2019    HDL 44 04/13/2018            Lab Results   Component Value Date    LDLCALC 57 04/23/2019    LDLCALC 51 03/22/2019    LDLCALC 73 04/13/2018            Lab Results   Component Value Date    LABVLDL 17 04/23/2019    LABVLDL 15 03/22/2019    LABVLDL 22 04/13/2018         CARDIAC DATA   EKG:   3/22/19 - first degree AV block, frequent PVC's, ventricular bigeminy. [address] [place] [address]: 4/9/19   Left ventricular systolic function is low normal with ejection fraction   estimated at 45-50 %. Mild anterior,Anteroseptal and inferoseptal wall  hypokinesia. Normal left ventricular wall thickness. Left ventricle size is normal.   Normal left ventricular diastolic filling pressure. The mitral valve normal in structure and function. Mild mitral regurgitation. The aortic valve sclerosis with normal opening. No evidence of aortic valve regurgitation     CARDIAC CATH: 4/23/19 LEFT HEART CATH  LM: no angiographic CAD  LAD: luminals  LCX: No angiographic CAD, JUSTIN-2 flow                OM-1 large, branching  RCA: dominant, prox/mid luminals.  Justin-2 flow (5 beats to get flow distally, very slow to clear dye)  LVEDP: 10  LVEF: 55%  Assessment  1. Mild nonobstructive CAD  2. Cardiac microvascular disease as above with very poor flow in RCA and LAD. - medical management  3. QUIT smoking! [facility] 4/19/19 - 13% PVC's  - started on Nadolol 20 mg bid      Assessment and Plan   Eagle Mendes is a 61 y.o. male who presents today for the following problems:      1. PVCs: Pt asymptomatic but 13% on initial monitor  2. Ischemic Cardiomyopathy: LVEF 45%  3. Cardiac syndrome X/Microvascular angina  4. Tobacco abuse: improving    MD Plan:  1. Pt with cath showing no significant CAD but very sluggish flow. Suspect this and PVCs likely led to drop in LVEF. 2. Pt does not feel PVC and +ectopy on exam, will place another 24hr monitor to reassess PVC burden, may need EP/ablation  3. Limited in 2 months  4. Ok to move imdur to PM, nadolol BID and call if any syncope. dizziness   5. Quit smoking! Patient Plan:  1. 24 hour heart monitor   2. Continue current medications  3. Limited Echocardiogram in 2-3 months  4. Follow up in 2 months     It is a pleasure to assist in the care of Eagle Mendes. Please call with any questions. This note was scribed in the presence of Dr. Aníbal Vazquez MD by Marie Corral RN. The scribes documentation has been prepared under my direction and personally reviewed by me in its entirety. I confirm that the note above accurately reflects all work, treatment, procedures, and medical decision making performed by me. I, Dr. Gisele Pate MD, personally performed the services described in this documentation as scribed by OXANA in my presence, and it is both accurate and complete to the best of our ability.            Gisele Pate MD, [address] Cardiologist  [address]  [phone] [facility]  [phone] [address] [place] [address]  5/16/2019  7:38 AM

## 2019-05-16 ENCOUNTER — OFFICE VISIT (OUTPATIENT)
Dept: CARDIOLOGY CLINIC | Age: 64
End: 2019-05-16
Payer: COMMERCIAL

## 2019-05-16 VITALS
DIASTOLIC BLOOD PRESSURE: 52 MMHG | SYSTOLIC BLOOD PRESSURE: 102 MMHG | BODY MASS INDEX: 23.99 KG/M2 | OXYGEN SATURATION: 98 % | HEIGHT: 65 IN | WEIGHT: 144 LBS | HEART RATE: 48 BPM

## 2019-05-16 DIAGNOSIS — I49.3 PVC (PREMATURE VENTRICULAR CONTRACTION): Primary | ICD-10-CM

## 2019-05-16 DIAGNOSIS — I25.5 ISCHEMIC CARDIOMYOPATHY: ICD-10-CM

## 2019-05-16 DIAGNOSIS — I25.89 CARDIAC MICROVASCULAR DISEASE: ICD-10-CM

## 2019-05-16 DIAGNOSIS — I49.9 IRREGULAR HEART RATE: ICD-10-CM

## 2019-05-16 DIAGNOSIS — Z72.0 TOBACCO ABUSE: ICD-10-CM

## 2019-05-16 PROCEDURE — 99214 OFFICE O/P EST MOD 30 MIN: CPT | Performed by: INTERNAL MEDICINE

## 2019-05-16 NOTE — LETTER
Chest Wall:  No tenderness or deformity   Heart:  Regular rhythm and normal rate; S1, S2 are normal; +Ectopy  no murmur noted; no rub or gallop   Abdomen:   Soft, non-tender, +BS x 4, no masses, no organomegaly   Extremities: Extremities normal, atraumatic, no cyanosis or edema   Pulses: 2+ and symmetric   Skin: Skin color, texture, turgor normal, no rashes or lesions   Pysch: Normal mood and affect   Neurologic: Normal gross motor and sensory exam.         LABS   CBC:      Lab Results   Component Value Date    WBC 6.1 04/23/2019    RBC 4.76 04/23/2019    HGB 15.3 04/23/2019    HCT 44.4 04/23/2019    MCV 93.2 04/23/2019    RDW 13.7 04/23/2019     04/23/2019     CMP:  Lab Results   Component Value Date     04/23/2019    K 4.1 04/23/2019     04/23/2019    CO2 29 04/23/2019    BUN 21 04/23/2019    CREATININE 0.8 04/23/2019    GFRAA >60 04/23/2019    AGRATIO 1.7 03/22/2019    LABGLOM >60 04/23/2019    GLUCOSE 115 04/23/2019    PROT 6.7 03/22/2019    CALCIUM 9.8 04/23/2019    BILITOT 0.4 03/22/2019    ALKPHOS 81 03/22/2019    AST 28 03/22/2019    ALT 29 03/22/2019     PT/INR:   No results found for: PTINR  Liver:  No components found for: CHLPL  Lab Results   Component Value Date    ALT 29 03/22/2019    AST 28 03/22/2019    ALKPHOS 81 03/22/2019    BILITOT 0.4 03/22/2019     No results found for: LABA1C  Lipids:         Lab Results   Component Value Date    TRIG 73 03/22/2019    TRIG 108 04/13/2018    TRIG 84 05/22/2017            Lab Results   Component Value Date    HDL 35 (L) 04/23/2019    HDL 43 03/22/2019    HDL 44 04/13/2018            Lab Results   Component Value Date    LDLCALC 57 04/23/2019    LDLCALC 51 03/22/2019    LDLCALC 73 04/13/2018            Lab Results   Component Value Date    LABVLDL 17 04/23/2019    LABVLDL 15 03/22/2019    LABVLDL 22 04/13/2018         CARDIAC DATA   EKG:   3/22/19 - first degree AV block, frequent PVC's, ventricular bigeminy.      ECHO/MUGA: 4/9/19 Left ventricular systolic function is low normal with ejection fraction   estimated at 45-50 %. Mild anterior,Anteroseptal and inferoseptal wall  hypokinesia. Normal left ventricular wall thickness. Left ventricle size is normal.   Normal left ventricular diastolic filling pressure. The mitral valve normal in structure and function. Mild mitral regurgitation. The aortic valve sclerosis with normal opening. No evidence of aortic valve regurgitation     CARDIAC CATH: 4/23/19 LEFT HEART CATH  LM: no angiographic CAD  LAD: luminals  LCX: No angiographic CAD, JUSTIN-2 flow                OM-1 large, branching  RCA: dominant, prox/mid luminals. Justin-2 flow (5 beats to get flow distally, very slow to clear dye)  LVEDP: 10  LVEF: 55%  Assessment  1. Mild nonobstructive CAD  2. Cardiac microvascular disease as above with very poor flow in RCA and LAD. - medical management  3. QUIT smoking! [facility] 4/19/19 - 13% PVC's  - started on Nadolol 20 mg bid      Assessment and Plan   Eagle Mendes is a 61 y.o. male who presents today for the following problems:      1. PVCs: Pt asymptomatic but 13% on initial monitor  2. Ischemic Cardiomyopathy: LVEF 45%  3. Cardiac syndrome X/Microvascular angina  4. Tobacco abuse: improving    MD Plan:  1. Pt with cath showing no significant CAD but very sluggish flow. Suspect this and PVCs likely led to drop in LVEF. 2. Pt does not feel PVC and +ectopy on exam, will place another 24hr monitor to reassess PVC burden, may need EP/ablation  3. Limited in 2 months  4. Ok to move imdur to PM, nadolol BID and call if any syncope. dizziness   5. Quit smoking! Patient Plan:  1. 24 hour heart monitor   2. Continue current medications  3. Limited Echocardiogram in 2-3 months  4. Follow up in 2 months     It is a pleasure to assist in the care of Eagle Mendes. Please call with any questions. This note was scribed in the presence of Dr. Kenrick Hector MD by Barbara Yepez, ALE. The scribes documentation has been prepared under my direction and personally reviewed by me in its entirety. I confirm that the note above accurately reflects all work, treatment, procedures, and medical decision making performed by me. I, Dr. Luca Wallace MD, personally performed the services described in this documentation as scribed by OXANA in my presence, and it is both accurate and complete to the best of our ability.            Luca Wallace MD, [address] [place] [address] Cardiologist  [facility]  [phone] [facility]  [phone] [address] [place] [address]  5/16/2019  7:38 AM                Sincerely,        Emily Ash MD

## 2019-05-16 NOTE — PATIENT INSTRUCTIONS
Patient Plan:  1. 24 hour heart monitor   2. Continue current medications  3. Limited Echocardiogram in 2-3 months  4.  Follow up in 2 months

## 2019-05-29 ENCOUNTER — TELEPHONE (OUTPATIENT)
Dept: CARDIOLOGY CLINIC | Age: 64
End: 2019-05-29

## 2019-06-04 ENCOUNTER — OFFICE VISIT (OUTPATIENT)
Dept: CARDIOLOGY CLINIC | Age: 64
End: 2019-06-04
Payer: COMMERCIAL

## 2019-06-04 VITALS
SYSTOLIC BLOOD PRESSURE: 112 MMHG | WEIGHT: 145 LBS | DIASTOLIC BLOOD PRESSURE: 72 MMHG | OXYGEN SATURATION: 98 % | HEIGHT: 65 IN | BODY MASS INDEX: 24.16 KG/M2 | HEART RATE: 65 BPM

## 2019-06-04 DIAGNOSIS — I49.3 PVC (PREMATURE VENTRICULAR CONTRACTION): Primary | ICD-10-CM

## 2019-06-04 PROCEDURE — 93000 ELECTROCARDIOGRAM COMPLETE: CPT | Performed by: INTERNAL MEDICINE

## 2019-06-04 PROCEDURE — 99214 OFFICE O/P EST MOD 30 MIN: CPT | Performed by: INTERNAL MEDICINE

## 2019-06-04 NOTE — PROGRESS NOTES
[facility]   Electrophysiology Consult Note              Date:  June 4, 2019  Patient name: Manohar Paige  YOB: 1955    Reason for Consult:  New Patient    Requesting Physician: Dr. Destiny Robles: Manohar Paige is a 61 y.o. male who presents for evaluation of PVCs. He was initially referred to cardiology by Dr. Erich Evangelista for an irregular heart rate. A 48 hour Holter monitor worn from 3/25/19-3/28/19 showed a 13% PVC burden. He was then started on nadolol 20 mg BID. A LHC performed on 4/23/19 showed mild CAD and microvascular disease with poor flow in the RCA and LAD. His echocardiogram on 4/9/19 showed an EF of 45-50%. Today he reports he is feeling well overall. He does not experience any symptoms with his PVCs. He denies sleep apnea, but endorses day time drowsiness. He also feels easy fatigue. He is taking his medications as prescribed without issue. Patient currently denies any weight gain, edema, palpitations, chest pain, shortness of breath, dizziness, and syncope. Past Medical History:   has a past medical history of Abnormal echocardiogram, Hyperlipidemia, Hypertension, Prostate cancer ([address] and Smoker. Past Surgical History:   has a past surgical history that includes Prostatectomy (02/14/2012); Colonoscopy (3/22/12); Eye surgery (1989); Prostate biopsy; and Cardiac catheterization (04/23/2019). Allergies:  Patient has no known allergies. Social History:   reports that he has been smoking cigarettes. He has a 15.00 pack-year smoking history. He uses smokeless tobacco. He reports that he drinks alcohol. Family History: family history includes Diabetes in his father; Heart Disease in his father; Stroke in his father. Home Medications:    Prior to Admission medications    Medication Sig Start Date End Date Taking?  Authorizing Provider   isosorbide mononitrate (IMDUR) 30 MG extended release tablet Take 1 tablet by mouth daily 4/23/19  Yes Delia Escalera MD   nadolol (CORGARD) 20 MG tablet Take 1 tablet by mouth 2 times daily 4/11/19  Yes Delia Escalera MD   aspirin 325 MG tablet Take 325 mg by mouth daily   Yes Historical Provider, MD   hydrochlorothiazide (HYDRODIURIL) 25 MG tablet TAKE 1 TABLET DAILY 3/22/19  Yes Miya Henry MD   pravastatin (PRAVACHOL) 40 MG tablet TAKE 1 TABLET DAILY 3/22/19  Yes Miya Henry MD   niacin 500 MG tablet Take 500 mg by mouth daily. Yes Historical Provider, MD          REVIEW OF SYSTEMS:      All 14-point review of systems are completed and  pertinent positives are mentioned in the history of present illness. Other  systems are reviewed and are negative. Physical Examination:    /72   Pulse 65   Ht 5' 5\" (1.651 m)   Wt 145 lb (65.8 kg)   SpO2 98%   BMI 24.13 kg/m²      Constitutional and General Appearance:    alert, cooperative, no distress and appears stated age  [de-identified]:    PERRLA, no cervical lymphadenopathy. No masses palpable. Normal oral  mucosa  Respiratory:  · Normal excursion and expansion without use of accessory muscles  · Resp Auscultation: Normal breath sounds without dullness or wheezing  Cardiovascular:  · The apical impulse is not displaced  · Heart tones are crisp and normal. regular S1 and S2.  · Jugular venous pulsation Normal  · The carotid upstroke is normal in amplitude and contour without delay or bruit  · Peripheral pulses are symmetrical and full  Abdomen:  · No masses or tenderness  · Bowel sounds present  Extremities:  ·  No Cyanosis or Clubbing  ·  Lower extremity edema: No  · Skin: Warm and dry  Neurological:  · Alert and oriented. · Moves all extremities well  · No abnormalities of mood, affect, memory, mentation, or behavior are noted      DATA:    ECG:  ECHO 4/9/19:   Left ventricular systolic function is low normal with ejection fraction   estimated at 45-50 %. Mild anterior,Anteroseptal and inferoseptal wall   hypokinesia.    Normal

## 2019-06-06 DIAGNOSIS — I49.9 IRREGULAR HEART RATE: ICD-10-CM

## 2019-06-06 DIAGNOSIS — I49.3 PVC (PREMATURE VENTRICULAR CONTRACTION): ICD-10-CM

## 2019-06-06 PROCEDURE — 93224 XTRNL ECG REC UP TO 48 HRS: CPT | Performed by: INTERNAL MEDICINE

## 2019-06-28 ENCOUNTER — OFFICE VISIT (OUTPATIENT)
Dept: PULMONOLOGY | Age: 64
End: 2019-06-28
Payer: COMMERCIAL

## 2019-06-28 VITALS
HEIGHT: 65 IN | TEMPERATURE: 97.8 F | WEIGHT: 143.6 LBS | BODY MASS INDEX: 23.93 KG/M2 | HEART RATE: 50 BPM | OXYGEN SATURATION: 98 % | DIASTOLIC BLOOD PRESSURE: 62 MMHG | SYSTOLIC BLOOD PRESSURE: 120 MMHG

## 2019-06-28 DIAGNOSIS — R53.83 OTHER FATIGUE: ICD-10-CM

## 2019-06-28 DIAGNOSIS — R06.83 SNORING: Primary | ICD-10-CM

## 2019-06-28 DIAGNOSIS — G47.10 HYPERSOMNIA: ICD-10-CM

## 2019-06-28 DIAGNOSIS — I10 HYPERTENSION, UNSPECIFIED TYPE: ICD-10-CM

## 2019-06-28 DIAGNOSIS — I49.3 PVC (PREMATURE VENTRICULAR CONTRACTION): ICD-10-CM

## 2019-06-28 PROCEDURE — 99244 OFF/OP CNSLTJ NEW/EST MOD 40: CPT | Performed by: INTERNAL MEDICINE

## 2019-06-28 ASSESSMENT — SLEEP AND FATIGUE QUESTIONNAIRES
HOW LIKELY ARE YOU TO NOD OFF OR FALL ASLEEP WHILE WATCHING TV: 2
HOW LIKELY ARE YOU TO NOD OFF OR FALL ASLEEP WHEN YOU ARE A PASSENGER IN A CAR FOR AN HOUR WITHOUT A BREAK: 0
HOW LIKELY ARE YOU TO NOD OFF OR FALL ASLEEP WHILE LYING DOWN TO REST IN THE AFTERNOON WHEN CIRCUMSTANCES PERMIT: 2
HOW LIKELY ARE YOU TO NOD OFF OR FALL ASLEEP WHILE SITTING AND READING: 3
NECK CIRCUMFERENCE (INCHES): 14
HOW LIKELY ARE YOU TO NOD OFF OR FALL ASLEEP WHILE SITTING INACTIVE IN A PUBLIC PLACE: 3
ESS TOTAL SCORE: 11
HOW LIKELY ARE YOU TO NOD OFF OR FALL ASLEEP WHILE SITTING QUIETLY AFTER LUNCH WITHOUT ALCOHOL: 1
HOW LIKELY ARE YOU TO NOD OFF OR FALL ASLEEP IN A CAR, WHILE STOPPED FOR A FEW MINUTES IN TRAFFIC: 0
HOW LIKELY ARE YOU TO NOD OFF OR FALL ASLEEP WHILE SITTING AND TALKING TO SOMEONE: 0

## 2019-06-28 NOTE — LETTER
[facility] BEHAVIORAL HEALTH Pulmonary, Critical Care, and Sleep  [address], [address] [place] [address]  Phone: [phone]  Fax: [phone]    July 1, 2019     Patient: Bhakti Vázquez   MR Number: [id number]   YOB: 1955   Date of Visit: 6/28/2019     Dear Dr. Manny Westbrook: Thank you for the request for consultation for Junior Gresham to me for the evaluation. Below are the relevant portions of my assessment and plan of care. Assessment:       · Snoring  · Hypersomnia   · Fatigue   · PVCs  · HTN       Plan:       · PSG evaluate for sleep related breathing disorder. Treatment options were discussed with patient if PSG reveals ARGELIA, including CPAP therapy, oral appliances, upper airway surgery and hypoglossal nerve stimulation. · Discussed with patient the pathophysiology of apnea. · Sleep hygiene  · Avoid sedatives, alcohol and caffeinated drinks at bed time. · No driving motorized vehicles or operating heavy machinery while fatigue, drowsy or sleepy. · Treatment of ARGELIA can lower blood pressure by levels that are clinically significant. · Treatment with CPAP attenuates the risk of cardiovascular and cerebrovascular outcomes. · Complications of ARGELIA if not treated were discussed with patient patient to include systemic hypertension, pulmonary hypertension, cardiovascular morbidities, car accidents and all cause mortality. If you have questions, please do not hesitate to call me. I look forward to following Veronique Bellamy along with you.     Sincerely,        Jean Dubose MD

## 2019-06-28 NOTE — PATIENT INSTRUCTIONS
Remember to bring all pulmonary medications to your next appointment with the office. Please keep all of your future appointments scheduled by [address], [facility] Pulmonary office. Out of respect for other patients and providers, you may be asked to reschedule your appointment if you arrive later than your scheduled appointment time. Appointments cancelled less than 24hrs in advance will be considered a no show. Patients with three missed appointments within 1 year or four missed appointments within 2 years can be dismissed from the practice.

## 2019-06-28 NOTE — COMMUNICATION BODY
Assessment:       · Snoring  · Hypersomnia   · Fatigue   · PVCs  · HTN       Plan:       · PSG evaluate for sleep related breathing disorder. Treatment options were discussed with patient if PSG reveals ARGELIA, including CPAP therapy, oral appliances, upper airway surgery and hypoglossal nerve stimulation. · Discussed with patient the pathophysiology of apnea. · Sleep hygiene  · Avoid sedatives, alcohol and caffeinated drinks at bed time. · No driving motorized vehicles or operating heavy machinery while fatigue, drowsy or sleepy. · Treatment of ARGELIA can lower blood pressure by levels that are clinically significant. · Treatment with CPAP attenuates the risk of cardiovascular and cerebrovascular outcomes. · Complications of ARGELIA if not treated were discussed with patient patient to include systemic hypertension, pulmonary hypertension, cardiovascular morbidities, car accidents and all cause mortality.

## 2019-06-28 NOTE — PROGRESS NOTES
[facility] Pulmonary, Critical Care and Sleep Specialists                                                                  CHIEF COMPLAINT: Sleep apnea evaluation    Consulting provider:  Dr. Silvana Davenport       HPI:   61years old with history of PVCs here for sleep apnea evaluation. Snoring at night for the past 5 years. The severity of snoring is mild. Not sure what makes better or worse. Has no observed sleep apnea. Wakes up at night choking and gasping for air. Patient is complaining of daytime sleepiness, fatigue and tiredness at times during the day. Bedtime 9 pm and rise time is 5 am. Sleep onset 10 minutes. 0-1 nocturia. No nap during the day. No car wrecks or near wrecks because of the sleepiness. No nodding off while driving. ESS is 6    Old records were reviewed and summarized by me. Past Medical History:   Diagnosis Date    Abnormal echocardiogram 04/2019    Hyperlipidemia     Hypertension     Prostate cancer ([facility] [address] 2012    total prostatectomy,DrDilip    Smoker        Past Surgical History:        Procedure Laterality Date    CARDIAC CATHETERIZATION  04/23/2019    Non Obs CAD, medical management    COLONOSCOPY  3/22/12    EYE SURGERY  1989    eye trauma os with fixed pupil;vision is ok;    PROSTATE BIOPSY      PROSTATECTOMY  02/14/2012       Allergies:  has No Known Allergies. Social History:    TOBACCO:   reports that he has been smoking cigarettes. He has a 15.00 pack-year smoking history. He uses smokeless tobacco.  ETOH:   reports that he drinks alcohol.       Family History:       Problem Relation Age of Onset    Stroke Father     Heart Disease Father     Diabetes Father        Current Medications:    Current Outpatient Medications:     isosorbide mononitrate (IMDUR) 30 MG extended release tablet, Take 1 tablet by mouth daily, Disp: 30 tablet, Rfl: 3    nadolol (CORGARD) 20 MG tablet, Take 1 tablet by mouth 2 times daily, Disp: 60 tablet, Rfl: 5   including CPAP therapy, oral appliances, upper airway surgery and hypoglossal nerve stimulation. · Discussed with patient the pathophysiology of apnea. · Sleep hygiene  · Avoid sedatives, alcohol and caffeinated drinks at bed time. · No driving motorized vehicles or operating heavy machinery while fatigue, drowsy or sleepy. · Treatment of ARGELIA can lower blood pressure by levels that are clinically significant. · Treatment with CPAP attenuates the risk of cardiovascular and cerebrovascular outcomes. · Complications of ARGELIA if not treated were discussed with patient patient to include systemic hypertension, pulmonary hypertension, cardiovascular morbidities, car accidents and all cause mortality.

## 2019-07-22 NOTE — PROGRESS NOTES
[address]   Electrophysiology Follow Up              Date:  July 24, 2019  Patient name: Anne Payan  YOB: 1955    Reason for Visit:  1 Month Follow-Up    Primary Care Physician: Dr. Jinny Eldridge: Anne Payan is a 61 y.o. male who presents for evaluation of PVCs. He was initially referred to cardiology by Dr. Eddie Lynn for an irregular heart rate. A 48 hour Holter monitor worn from 3/25/19-3/28/19 showed a 13% PVC burden. He was then started on nadolol 20 mg BID. A LHC performed on 4/23/19 showed mild CAD and microvascular disease with poor flow in the RCA and LAD. His echocardiogram on 4/9/19 showed an EF of 45-50%. A 24 hour Holter monitor worn 5/16/19 showed a 20.3% PVC burden. He is scheduled to undergo a sleep evaluation on 7/25/19. He reports today that he is still having PVC's. He is still very fatigued and has his sleep apnea evaluation on 7/25/19. Past Medical History:   has a past medical history of Abnormal echocardiogram, Hyperlipidemia, Hypertension, Prostate cancer ([facility] [address] and Smoker. Past Surgical History:   has a past surgical history that includes Prostatectomy (02/14/2012); Colonoscopy (3/22/12); Eye surgery (1989); Prostate biopsy; and Cardiac catheterization (04/23/2019). Allergies:  Patient has no known allergies. Social History:   reports that he has been smoking cigarettes. He has a 15.00 pack-year smoking history. He uses smokeless tobacco. He reports that he drinks alcohol. Family History: family history includes Diabetes in his father; Heart Disease in his father; Stroke in his father. Home Medications:    Prior to Admission medications    Medication Sig Start Date End Date Taking?  Authorizing Provider   isosorbide mononitrate (IMDUR) 30 MG extended release tablet Take 1 tablet by mouth daily 4/23/19  Yes Enid Chávez MD   nadolol (CORGARD) 20 MG tablet Take 1 tablet by mouth 2 times daily 4/11/19  Yes Lilly Euceda MD   aspirin 325 MG tablet Take 325 mg by mouth daily   Yes Historical Provider, MD   hydrochlorothiazide (HYDRODIURIL) 25 MG tablet TAKE 1 TABLET DAILY 3/22/19  Yes Noemi Lindo MD   pravastatin (PRAVACHOL) 40 MG tablet TAKE 1 TABLET DAILY 3/22/19  Yes Noemi Lindo MD   niacin 500 MG tablet Take 500 mg by mouth daily. Yes Historical Provider, MD          REVIEW OF SYSTEMS:      All 14-point review of systems are completed and  pertinent positives are mentioned in the history of present illness. Other  systems are reviewed and are negative. Physical Examination:    /64   Pulse 50   Ht 5' 5\" (1.651 m)   Wt 139 lb (63 kg)   BMI 23.13 kg/m²      Constitutional and General Appearance:    alert, cooperative, no distress and appears stated age  [de-identified]:    PERRLA, no cervical lymphadenopathy. No masses palpable. Normal oral  mucosa  Respiratory:  · Normal excursion and expansion without use of accessory muscles  · Resp Auscultation: Normal breath sounds without dullness or wheezing  Cardiovascular:  · The apical impulse is not displaced  · Heart tones are crisp and normal. regular S1 and S2.  · Jugular venous pulsation Normal  · The carotid upstroke is normal in amplitude and contour without delay or bruit  · Peripheral pulses are symmetrical and full  Abdomen:  · No masses or tenderness  · Bowel sounds present  Extremities:  ·  No Cyanosis or Clubbing  ·  Lower extremity edema: No  · Skin: Warm and dry  Neurological:  · Alert and oriented. · Moves all extremities well  · No abnormalities of mood, affect, memory, mentation, or behavior are noted      DATA:    ECG:  ECHO 4/9/19:   Left ventricular systolic function is low normal with ejection fraction   estimated at 45-50 %. Mild anterior,Anteroseptal and inferoseptal wall   hypokinesia. Normal left ventricular wall thickness. Left ventricle size is normal.   Normal left ventricular diastolic filling pressure.

## 2019-07-24 ENCOUNTER — OFFICE VISIT (OUTPATIENT)
Dept: CARDIOLOGY CLINIC | Age: 64
End: 2019-07-24
Payer: COMMERCIAL

## 2019-07-24 VITALS
DIASTOLIC BLOOD PRESSURE: 64 MMHG | BODY MASS INDEX: 23.16 KG/M2 | SYSTOLIC BLOOD PRESSURE: 118 MMHG | WEIGHT: 139 LBS | HEART RATE: 50 BPM | HEIGHT: 65 IN

## 2019-07-24 DIAGNOSIS — I49.3 PVC (PREMATURE VENTRICULAR CONTRACTION): Primary | ICD-10-CM

## 2019-07-24 PROCEDURE — 93000 ELECTROCARDIOGRAM COMPLETE: CPT | Performed by: INTERNAL MEDICINE

## 2019-07-24 PROCEDURE — 99213 OFFICE O/P EST LOW 20 MIN: CPT | Performed by: INTERNAL MEDICINE

## 2019-07-24 NOTE — LETTER
[address]   Electrophysiology Follow Up              Date:  July 24, 2019  Patient name: Amanda Oviedo  YOB: 1955    Reason for Visit:  1 Month Follow-Up    Primary Care Physician: Dr. Jada Guo: Amanda Oviedo is a 61 y.o. male who presents for evaluation of PVCs. He was initially referred to cardiology by Dr. Alva Lan for an irregular heart rate. A 48 hour Holter monitor worn from 3/25/19-3/28/19 showed a 13% PVC burden. He was then started on nadolol 20 mg BID. A LHC performed on 4/23/19 showed mild CAD and microvascular disease with poor flow in the RCA and LAD. His echocardiogram on 4/9/19 showed an EF of 45-50%. A 24 hour Holter monitor worn 5/16/19 showed a 20.3% PVC burden. He is scheduled to undergo a sleep evaluation on 7/25/19. He reports today that he is still having PVC's. He is still very fatigued and has his sleep apnea evaluation on 7/25/19. Past Medical History:   has a past medical history of Abnormal echocardiogram, Hyperlipidemia, Hypertension, Prostate cancer ([address] and Smoker. Past Surgical History:   has a past surgical history that includes Prostatectomy (02/14/2012); Colonoscopy (3/22/12); Eye surgery (1989); Prostate biopsy; and Cardiac catheterization (04/23/2019). Allergies:  Patient has no known allergies. Social History:   reports that he has been smoking cigarettes. He has a 15.00 pack-year smoking history. He uses smokeless tobacco. He reports that he drinks alcohol. Family History: family history includes Diabetes in his father; Heart Disease in his father; Stroke in his father. Home Medications:    Prior to Admission medications    Medication Sig Start Date End Date Taking?  Authorizing Provider   isosorbide mononitrate (IMDUR) 30 MG extended release tablet Take 1 tablet by mouth daily 4/23/19  Yes Sonja Montanez MD

## 2019-07-25 ENCOUNTER — HOSPITAL ENCOUNTER (OUTPATIENT)
Dept: SLEEP CENTER | Age: 64
Discharge: HOME | End: 2019-07-27
Payer: COMMERCIAL

## 2019-07-25 DIAGNOSIS — R53.83 OTHER FATIGUE: ICD-10-CM

## 2019-07-25 DIAGNOSIS — R06.83 SNORING: ICD-10-CM

## 2019-07-25 DIAGNOSIS — G47.10 HYPERSOMNIA: ICD-10-CM

## 2019-07-25 PROCEDURE — 95810 POLYSOM 6/> YRS 4/> PARAM: CPT

## 2019-07-29 ENCOUNTER — TELEPHONE (OUTPATIENT)
Dept: PULMONOLOGY | Age: 64
End: 2019-07-29

## 2019-07-29 DIAGNOSIS — G47.33 MILD OBSTRUCTIVE SLEEP APNEA: Primary | ICD-10-CM

## 2019-08-01 ENCOUNTER — HOSPITAL ENCOUNTER (OUTPATIENT)
Dept: NON INVASIVE DIAGNOSTICS | Age: 64
Discharge: HOME | End: 2019-08-01
Payer: COMMERCIAL

## 2019-08-01 DIAGNOSIS — I49.3 PVC (PREMATURE VENTRICULAR CONTRACTION): ICD-10-CM

## 2019-08-01 PROCEDURE — 93308 TTE F-UP OR LMTD: CPT

## 2019-08-19 ENCOUNTER — TELEPHONE (OUTPATIENT)
Dept: CARDIOLOGY CLINIC | Age: 64
End: 2019-08-19

## 2019-08-21 NOTE — PROGRESS NOTES
has   improved and wall motion is better. [address] [place] [address] Street: 4/9/19   Left ventricular systolic function is low normal with ejection fraction   estimated at 45-50 %. Mild anterior,Anteroseptal and inferoseptal wall  hypokinesia. Normal left ventricular wall thickness. Left ventricle size is normal.   Normal left ventricular diastolic filling pressure. The mitral valve normal in structure and function. Mild mitral regurgitation. The aortic valve sclerosis with normal opening. No evidence of aortic valve regurgitation     CARDIAC CATH: 4/23/19 LEFT HEART CATH  LM: no angiographic CAD  LAD: luminals  LCX: No angiographic CAD, JUSTIN-2 flow                OM-1 large, branching  RCA: dominant, prox/mid luminals. Justin-2 flow (5 beats to get flow distally, very slow to clear dye)  LVEDP: 10  LVEF: 55%  Assessment  1. Mild nonobstructive CAD  2. Cardiac microvascular disease as above with very poor flow in RCA and LAD. - medical management  3. QUIT smoking! 24 hour Holter monitor worn 5/16/19 showed a 20.3% PVC burden     24 HOUR HOLTER MONITOR 4/19/19 - 13% PVC's  - started on Nadolol 20 mg bid        Assessment and Plan   Lis Brizuela is a 61 y.o. male who presents today for the following problems:      1. PVCs: Pt asymptomatic but 13% on initial monitor   - 20% on next monitor  2. Nonischemic Cardiomyopathy: LVEF 45%-->55% recovered  3. Cardiac syndrome X/Microvascular angina   - Imdur working well  4. ARGELIA: mild, new dx   - Getting CPAP testing next week  5. Tobacco abuse:  education    MD Plan:  1. Pt with cath showing no significant CAD but sluggish flow. 2. Less ectopy on exam and pt tolerates bradycardia  3. Will turn over care to EP as no further interventional need  4. Quit smoking! Patient Plan:  1. No change in medications  2. Move appt up with Robinson Frazier          It is a pleasure to assist in the care of Lis Brizuela. Please call with any questions.      This note was

## 2019-08-22 ENCOUNTER — OFFICE VISIT (OUTPATIENT)
Dept: CARDIOLOGY CLINIC | Age: 64
End: 2019-08-22
Payer: COMMERCIAL

## 2019-08-22 VITALS
HEART RATE: 44 BPM | BODY MASS INDEX: 23.63 KG/M2 | DIASTOLIC BLOOD PRESSURE: 70 MMHG | HEIGHT: 65 IN | OXYGEN SATURATION: 97 % | SYSTOLIC BLOOD PRESSURE: 118 MMHG | WEIGHT: 141.8 LBS

## 2019-08-22 DIAGNOSIS — I42.8 OTHER CARDIOMYOPATHY (HCC): ICD-10-CM

## 2019-08-22 DIAGNOSIS — I25.89 CARDIAC MICROVASCULAR DISEASE: ICD-10-CM

## 2019-08-22 DIAGNOSIS — I49.3 PVC (PREMATURE VENTRICULAR CONTRACTION): Primary | ICD-10-CM

## 2019-08-22 DIAGNOSIS — Z72.0 TOBACCO ABUSE: ICD-10-CM

## 2019-08-22 PROCEDURE — 99213 OFFICE O/P EST LOW 20 MIN: CPT | Performed by: INTERNAL MEDICINE

## 2019-08-27 ENCOUNTER — HOSPITAL ENCOUNTER (OUTPATIENT)
Dept: SLEEP CENTER | Age: 64
Discharge: HOME | End: 2019-08-29
Payer: COMMERCIAL

## 2019-08-27 DIAGNOSIS — G47.33 MILD OBSTRUCTIVE SLEEP APNEA: ICD-10-CM

## 2019-08-27 PROCEDURE — 95811 POLYSOM 6/>YRS CPAP 4/> PARM: CPT

## 2019-08-30 ENCOUNTER — TELEPHONE (OUTPATIENT)
Dept: PULMONOLOGY | Age: 64
End: 2019-08-30

## 2019-08-30 DIAGNOSIS — R53.83 OTHER FATIGUE: ICD-10-CM

## 2019-08-30 DIAGNOSIS — I10 HYPERTENSION, UNSPECIFIED TYPE: ICD-10-CM

## 2019-08-30 DIAGNOSIS — R06.83 SNORING: ICD-10-CM

## 2019-08-30 DIAGNOSIS — G47.10 HYPERSOMNIA: ICD-10-CM

## 2019-08-30 DIAGNOSIS — G47.33 MILD OBSTRUCTIVE SLEEP APNEA: Primary | ICD-10-CM

## 2019-09-13 RX ORDER — ISOSORBIDE MONONITRATE 30 MG/1
TABLET, EXTENDED RELEASE ORAL
Qty: 90 TABLET | Refills: 3 | Status: SHIPPED | OUTPATIENT
Start: 2019-09-13 | End: 2020-03-05 | Stop reason: SDUPTHER

## 2019-09-18 DIAGNOSIS — E78.5 HYPERLIPIDEMIA LDL GOAL <130: ICD-10-CM

## 2019-09-18 DIAGNOSIS — I10 ESSENTIAL HYPERTENSION: ICD-10-CM

## 2019-09-18 RX ORDER — HYDROCHLOROTHIAZIDE 25 MG/1
TABLET ORAL
Qty: 90 TABLET | Refills: 4 | OUTPATIENT
Start: 2019-09-18

## 2019-09-18 RX ORDER — PRAVASTATIN SODIUM 40 MG
TABLET ORAL
Qty: 90 TABLET | Refills: 4 | OUTPATIENT
Start: 2019-09-18

## 2019-09-19 ENCOUNTER — OFFICE VISIT (OUTPATIENT)
Dept: FAMILY MEDICINE CLINIC | Age: 64
End: 2019-09-19
Payer: COMMERCIAL

## 2019-09-19 VITALS
HEART RATE: 44 BPM | OXYGEN SATURATION: 96 % | SYSTOLIC BLOOD PRESSURE: 130 MMHG | DIASTOLIC BLOOD PRESSURE: 70 MMHG | BODY MASS INDEX: 24.06 KG/M2 | WEIGHT: 144.6 LBS | TEMPERATURE: 97.4 F

## 2019-09-19 DIAGNOSIS — E78.5 HYPERLIPIDEMIA LDL GOAL <130: ICD-10-CM

## 2019-09-19 DIAGNOSIS — Z23 NEED FOR IMMUNIZATION AGAINST INFLUENZA: Primary | ICD-10-CM

## 2019-09-19 DIAGNOSIS — I10 ESSENTIAL HYPERTENSION: ICD-10-CM

## 2019-09-19 PROCEDURE — 90471 IMMUNIZATION ADMIN: CPT | Performed by: FAMILY MEDICINE

## 2019-09-19 PROCEDURE — 99214 OFFICE O/P EST MOD 30 MIN: CPT | Performed by: FAMILY MEDICINE

## 2019-09-19 PROCEDURE — 90688 IIV4 VACCINE SPLT 0.5 ML IM: CPT | Performed by: FAMILY MEDICINE

## 2019-09-19 RX ORDER — HYDROCHLOROTHIAZIDE 25 MG/1
TABLET ORAL
Qty: 90 TABLET | Refills: 1 | Status: SHIPPED | OUTPATIENT
Start: 2019-09-19 | End: 2020-03-05 | Stop reason: SDUPTHER

## 2019-09-19 RX ORDER — PRAVASTATIN SODIUM 40 MG
TABLET ORAL
Qty: 90 TABLET | Refills: 1 | Status: SHIPPED | OUTPATIENT
Start: 2019-09-19 | End: 2020-03-05 | Stop reason: SDUPTHER

## 2019-09-19 ASSESSMENT — ENCOUNTER SYMPTOMS: RESPIRATORY NEGATIVE: 1

## 2019-10-28 ENCOUNTER — OFFICE VISIT (OUTPATIENT)
Dept: CARDIOLOGY CLINIC | Age: 64
End: 2019-10-28
Payer: COMMERCIAL

## 2019-10-28 VITALS
HEART RATE: 52 BPM | HEIGHT: 65 IN | DIASTOLIC BLOOD PRESSURE: 64 MMHG | WEIGHT: 146 LBS | OXYGEN SATURATION: 97 % | BODY MASS INDEX: 24.32 KG/M2 | SYSTOLIC BLOOD PRESSURE: 118 MMHG

## 2019-10-28 DIAGNOSIS — I10 ESSENTIAL HYPERTENSION: Primary | ICD-10-CM

## 2019-10-28 DIAGNOSIS — I49.3 PVC (PREMATURE VENTRICULAR CONTRACTION): ICD-10-CM

## 2019-10-28 DIAGNOSIS — Z72.0 TOBACCO ABUSE: ICD-10-CM

## 2019-10-28 PROCEDURE — 99214 OFFICE O/P EST MOD 30 MIN: CPT | Performed by: NURSE PRACTITIONER

## 2019-10-28 PROCEDURE — 93000 ELECTROCARDIOGRAM COMPLETE: CPT | Performed by: NURSE PRACTITIONER

## 2019-11-01 PROCEDURE — 93224 XTRNL ECG REC UP TO 48 HRS: CPT | Performed by: INTERNAL MEDICINE

## 2019-11-04 ENCOUNTER — TELEPHONE (OUTPATIENT)
Dept: CARDIOLOGY CLINIC | Age: 64
End: 2019-11-04

## 2019-11-04 DIAGNOSIS — I49.3 PVC (PREMATURE VENTRICULAR CONTRACTION): ICD-10-CM

## 2019-11-05 ENCOUNTER — OFFICE VISIT (OUTPATIENT)
Dept: PULMONOLOGY | Age: 64
End: 2019-11-05
Payer: COMMERCIAL

## 2019-11-05 VITALS
SYSTOLIC BLOOD PRESSURE: 128 MMHG | HEIGHT: 65 IN | DIASTOLIC BLOOD PRESSURE: 62 MMHG | BODY MASS INDEX: 24.39 KG/M2 | TEMPERATURE: 98 F | HEART RATE: 45 BPM | RESPIRATION RATE: 18 BRPM | WEIGHT: 146.4 LBS | OXYGEN SATURATION: 97 %

## 2019-11-05 DIAGNOSIS — I49.3 PVC'S (PREMATURE VENTRICULAR CONTRACTIONS): ICD-10-CM

## 2019-11-05 DIAGNOSIS — G47.33 OSA (OBSTRUCTIVE SLEEP APNEA): Primary | ICD-10-CM

## 2019-11-05 PROCEDURE — 99213 OFFICE O/P EST LOW 20 MIN: CPT | Performed by: INTERNAL MEDICINE

## 2019-11-05 ASSESSMENT — SLEEP AND FATIGUE QUESTIONNAIRES
HOW LIKELY ARE YOU TO NOD OFF OR FALL ASLEEP WHEN YOU ARE A PASSENGER IN A CAR FOR AN HOUR WITHOUT A BREAK: 0
HOW LIKELY ARE YOU TO NOD OFF OR FALL ASLEEP WHILE SITTING AND TALKING TO SOMEONE: 0
ESS TOTAL SCORE: 5
NECK CIRCUMFERENCE (INCHES): 14
HOW LIKELY ARE YOU TO NOD OFF OR FALL ASLEEP WHILE WATCHING TV: 2
HOW LIKELY ARE YOU TO NOD OFF OR FALL ASLEEP WHILE LYING DOWN TO REST IN THE AFTERNOON WHEN CIRCUMSTANCES PERMIT: 3
HOW LIKELY ARE YOU TO NOD OFF OR FALL ASLEEP WHILE SITTING AND READING: 0
HOW LIKELY ARE YOU TO NOD OFF OR FALL ASLEEP IN A CAR, WHILE STOPPED FOR A FEW MINUTES IN TRAFFIC: 0
HOW LIKELY ARE YOU TO NOD OFF OR FALL ASLEEP WHILE SITTING INACTIVE IN A PUBLIC PLACE: 0
HOW LIKELY ARE YOU TO NOD OFF OR FALL ASLEEP WHILE SITTING QUIETLY AFTER LUNCH WITHOUT ALCOHOL: 0

## 2019-11-15 RX ORDER — NADOLOL 20 MG/1
20 TABLET ORAL 2 TIMES DAILY
Qty: 180 TABLET | Refills: 1 | Status: SHIPPED | OUTPATIENT
Start: 2019-11-15 | End: 2020-03-05 | Stop reason: SDUPTHER

## 2019-11-19 RX ORDER — NADOLOL 20 MG/1
TABLET ORAL
Qty: 60 TABLET | Refills: 4 | Status: SHIPPED
Start: 2019-11-19 | End: 2020-03-05 | Stop reason: CLARIF

## 2020-02-11 ENCOUNTER — OFFICE VISIT (OUTPATIENT)
Dept: PULMONOLOGY | Age: 65
End: 2020-02-11
Payer: COMMERCIAL

## 2020-02-11 VITALS
RESPIRATION RATE: 16 BRPM | DIASTOLIC BLOOD PRESSURE: 69 MMHG | OXYGEN SATURATION: 97 % | SYSTOLIC BLOOD PRESSURE: 109 MMHG | TEMPERATURE: 98.8 F | BODY MASS INDEX: 24.32 KG/M2 | HEIGHT: 65 IN | WEIGHT: 146 LBS | HEART RATE: 59 BPM

## 2020-02-11 PROCEDURE — 99213 OFFICE O/P EST LOW 20 MIN: CPT | Performed by: NURSE PRACTITIONER

## 2020-02-11 ASSESSMENT — SLEEP AND FATIGUE QUESTIONNAIRES
HOW LIKELY ARE YOU TO NOD OFF OR FALL ASLEEP WHILE SITTING QUIETLY AFTER LUNCH WITHOUT ALCOHOL: 0
HOW LIKELY ARE YOU TO NOD OFF OR FALL ASLEEP WHILE WATCHING TV: 1
HOW LIKELY ARE YOU TO NOD OFF OR FALL ASLEEP WHILE LYING DOWN TO REST IN THE AFTERNOON WHEN CIRCUMSTANCES PERMIT: 1
HOW LIKELY ARE YOU TO NOD OFF OR FALL ASLEEP WHILE SITTING AND TALKING TO SOMEONE: 0
HOW LIKELY ARE YOU TO NOD OFF OR FALL ASLEEP WHILE SITTING INACTIVE IN A PUBLIC PLACE: 0
ESS TOTAL SCORE: 3
NECK CIRCUMFERENCE (INCHES): 14.5
HOW LIKELY ARE YOU TO NOD OFF OR FALL ASLEEP IN A CAR, WHILE STOPPED FOR A FEW MINUTES IN TRAFFIC: 0
HOW LIKELY ARE YOU TO NOD OFF OR FALL ASLEEP WHEN YOU ARE A PASSENGER IN A CAR FOR AN HOUR WITHOUT A BREAK: 0
HOW LIKELY ARE YOU TO NOD OFF OR FALL ASLEEP WHILE SITTING AND READING: 1

## 2020-02-11 NOTE — PATIENT INSTRUCTIONS
.Please keep all of your future appointments scheduled by [facility] Pulmonary office. Out of respect for other patients and providers, you may be asked to reschedule your appointment if you arrive later than your scheduled appointment time. Appointments cancelled less than 24hrs in advance will be considered a no show. Patients with three missed appointments within 1 year or four missed appointments within 2 years can be dismissed from the practice. You may receive a survey regarding the care you received during your visit. Your input is valuable to us. We encourage you to complete and return your survey. We hope you will choose us in the future for your healthcare needs. Remember to bring your sleep machine, cord and mask to each appointment    You should have a follow up appointment scheduled with a sleep medicine provider within 12 months. Routine parts, masks, tubing and filters should all be obtainable from your DME (equipment) provider. Any problems related to mask fit, comfort or functioning of equipment should also be addressed directly with your DME provider. If you are unable to resolve problems, then please notify our office and schedule an appointment to be seen sooner than the usual follow up appointment. For all patients who are evaluated for sleep disorders, never drive or operate motorized equipment while sleepy, fatigued or groggy. Please bring in all of your sleep equipment to all of your appointments, including machine, mask, cords and hoses. Here are some tips to to getting better sleep  1- Avoid napping during the day: This will ensure you are tired at bedtime. If you have to take a nap, sleep less than one hour, before 3 pm.   2- Exercise regularly, but not right before bed: but the timing of the workout is important. Exercising in the morning or early afternoon will not interfere with sleep.   Exercising within two hours before bedtime can decrease bedding can prevent good sleep. Evaluate whether or not this is a source of your problem, and make appropriate changes. 13- Make sure your bed and bedroom are quiet and comfortable: A hot room can be uncomfortable. A cooler room, along with enough blankets to stay warm is recommended. Get a blackout shade or wear a slumber mask and wear earplugs or get a \"white noise\" machine for light and noise distractions. 14- Use sunlight to set your biological clock: When you get up in the morning, go outside and turn your face to the sun for 15 minutes. 13- Dont take your worries to bed: Leave worries about job, school, daily life, etc., behind when you go to bed. Some people find it useful to assign a \"worry period\" during the evening or afternoon for these issues. CPAP Equipment Cleaning and Disinfecting Schedule  Equipment Cleaning Frequency Instructions  Disinfecting Frequency   Non-Disposable Filters  Weekly Mild soapy water, Rinse, Air Dry Not Required   Disposable Filters Change as needed  2-4 weeks Do Not Wash Not Required   Hose/tubing Daily Mild soapy water, Rinse, Air Dry Once a week   Mask / Nasal Pillows Daily Mild soapy water, Rinse, Air Dry Once a week   Headgear Weekly Hand wash, Mild soapy water, Rinse, Dry  Not Required   Humidifier Daily Empty water daily  Mild soapy water, Rinse well, Air Dry  Once a week   CPAP Unit As Needed Dust with damp cloth,  No detergents or sprays Not Required         Disinfect (per schedule) with 1 part white vinegar and 3 parts water- soak mask and water chamber for 30 minutes every 1-2 weeks, more often if sick. Allow water/vinegar mixture to run through tubing. Allow all equipment to air dry. Drying Hints:   Always hang tubing away from direct sunlight, as this will cause the tubing to become yellow, brittle and crack over a period of time. DO NOT attach the wet tubing to your CPAP unit to blow-dry it. The moisture from the tubing can drain back into your machine. Moisture in your unit can cause sudden pressure increases or short circuits  DO's and DON'Ts:  - Don't use alcohol-based products to clean your mask, because it can cause the materials to become hard and brittle. - Don't put headgear in the washer or dryer  - Don't use any caustic or household cleaning solutions such as bleach on your CPAP   equipment.  - Do follow the recommended cleaning schedule. - Do change your disposable filter frequently. Adapted From: [employer]/cleaning. shtm.   These are general suggestions for all models please follow specific s recommendations and specific instructions

## 2020-02-11 NOTE — PROGRESS NOTES
Patient ID: Eveline Preciado is a 59 y.o. male who is being seen today for   Chief Complaint   Patient presents with    Sleep Apnea     DOT compliance         HPI:     Eveline Preciado is a 59 y.o. male in office for ARGELIA follow up. Patient is using CPAP 4-6 hrs/night. Using humidifier. No snoring on CPAP. The pressure is well tolerated. The mask is comfortable- nasal pillows. No mask leak. No significant daytime sleepiness. No nodding off when driving. Sometimes dry mouth- chin strap. No fatigue. Bedtime is 730-830 pm and rise time is 430-5 am. Sleep onset is few minutes. Wakes up 1 times at night total stays awake for 60 min watches TV then back to bed. 1 nocturia. Rare naps during the day. No headache in am. No weight gain. 2 caffienated beverages during the day just in the morning. No alcohol. ESS is 3. Sleep Medicine 2/11/2020 11/5/2019 6/28/2019   Sitting and reading 1 0 3   Watching TV 1 2 2   Sitting, inactive in a public place (e.g. a theatre or a meeting) 0 0 3   As a passenger in a car for an hour without a break 0 0 0   Lying down to rest in the afternoon when circumstances permit 1 3 2   Sitting and talking to someone 0 0 0   Sitting quietly after a lunch without alcohol 0 0 1   In a car, while stopped for a few minutes in traffic 0 0 0   Total score 3 5 11   Neck circumference 14.5 14 14       Past Medical History:  Past Medical History:   Diagnosis Date    Abnormal echocardiogram 04/2019    Hyperlipidemia     Hypertension     Prostate cancer ([facility] [address] 2012    total prostatectomy,DrBey    Sleep apnea     Smoker        Past Surgical History:        Procedure Laterality Date    CARDIAC CATHETERIZATION  04/23/2019    Non Obs CAD, medical management    COLONOSCOPY  3/22/12    EYE SURGERY  1989    eye trauma os with fixed pupil;vision is ok;    PROSTATE BIOPSY      PROSTATECTOMY  02/14/2012       Allergies:  has No Known Allergies.   Social History:    TOBACCO:   reports that he has been smoking

## 2020-03-05 ENCOUNTER — OFFICE VISIT (OUTPATIENT)
Dept: FAMILY MEDICINE CLINIC | Age: 65
End: 2020-03-05
Payer: COMMERCIAL

## 2020-03-05 PROBLEM — G47.33 OSA (OBSTRUCTIVE SLEEP APNEA): Status: ACTIVE | Noted: 2020-03-05

## 2020-03-05 LAB
A/G RATIO: 1.8 (ref 1.1–2.2)
ALBUMIN SERPL-MCNC: 4.4 G/DL (ref 3.4–5)
ALP BLD-CCNC: 76 U/L (ref 40–129)
ALT SERPL-CCNC: 25 U/L (ref 10–40)
ANION GAP SERPL CALCULATED.3IONS-SCNC: 12 MMOL/L (ref 3–16)
AST SERPL-CCNC: 21 U/L (ref 15–37)
BILIRUB SERPL-MCNC: 0.3 MG/DL (ref 0–1)
BUN BLDV-MCNC: 22 MG/DL (ref 7–20)
CALCIUM SERPL-MCNC: 9.7 MG/DL (ref 8.3–10.6)
CHLORIDE BLD-SCNC: 102 MMOL/L (ref 99–110)
CHOLESTEROL, TOTAL: 125 MG/DL (ref 0–199)
CO2: 28 MMOL/L (ref 21–32)
CREAT SERPL-MCNC: 0.9 MG/DL (ref 0.8–1.3)
GFR AFRICAN AMERICAN: >60
GFR NON-AFRICAN AMERICAN: >60
GLOBULIN: 2.4 G/DL
GLUCOSE BLD-MCNC: 110 MG/DL (ref 70–99)
HDLC SERPL-MCNC: 38 MG/DL (ref 40–60)
LDL CHOLESTEROL CALCULATED: 70 MG/DL
POTASSIUM SERPL-SCNC: 4.5 MMOL/L (ref 3.5–5.1)
SODIUM BLD-SCNC: 142 MMOL/L (ref 136–145)
TOTAL PROTEIN: 6.8 G/DL (ref 6.4–8.2)
TRIGL SERPL-MCNC: 84 MG/DL (ref 0–150)
VITAMIN D 25-HYDROXY: 24.9 NG/ML
VLDLC SERPL CALC-MCNC: 17 MG/DL

## 2020-03-05 PROCEDURE — 99213 OFFICE O/P EST LOW 20 MIN: CPT | Performed by: FAMILY MEDICINE

## 2020-03-05 RX ORDER — PRAVASTATIN SODIUM 40 MG
TABLET ORAL
Qty: 90 TABLET | Refills: 3 | Status: SHIPPED | OUTPATIENT
Start: 2020-03-05 | End: 2021-01-14 | Stop reason: SDUPTHER

## 2020-03-05 RX ORDER — ISOSORBIDE MONONITRATE 30 MG/1
TABLET, EXTENDED RELEASE ORAL
Qty: 90 TABLET | Refills: 3 | Status: SHIPPED | OUTPATIENT
Start: 2020-03-05 | End: 2021-01-14 | Stop reason: SDUPTHER

## 2020-03-05 RX ORDER — ISOSORBIDE MONONITRATE 30 MG/1
TABLET, EXTENDED RELEASE ORAL
Qty: 90 TABLET | Refills: 1 | Status: CANCELLED | OUTPATIENT
Start: 2020-03-05

## 2020-03-05 RX ORDER — HYDROCHLOROTHIAZIDE 25 MG/1
TABLET ORAL
Qty: 90 TABLET | Refills: 3 | Status: SHIPPED | OUTPATIENT
Start: 2020-03-05 | End: 2021-01-14 | Stop reason: SDUPTHER

## 2020-03-05 RX ORDER — HYDROCHLOROTHIAZIDE 25 MG/1
TABLET ORAL
Qty: 90 TABLET | Refills: 1 | Status: CANCELLED | OUTPATIENT
Start: 2020-03-05

## 2020-03-05 RX ORDER — NADOLOL 20 MG/1
20 TABLET ORAL 2 TIMES DAILY
Qty: 180 TABLET | Refills: 3 | Status: SHIPPED | OUTPATIENT
Start: 2020-03-05 | End: 2021-01-14 | Stop reason: SDUPTHER

## 2020-03-05 NOTE — PROGRESS NOTES
apnea)    Other orders  -     isosorbide mononitrate (IMDUR) 30 MG extended release tablet; TAKE ONE TABLET BY MOUTH DAILY           Plan:      Return in about 6 months (around 9/5/2020). There are no Patient Instructions on file for this visit.          Gely Landaverde MA

## 2020-03-08 VITALS
OXYGEN SATURATION: 96 % | SYSTOLIC BLOOD PRESSURE: 124 MMHG | WEIGHT: 152 LBS | DIASTOLIC BLOOD PRESSURE: 76 MMHG | HEART RATE: 80 BPM | BODY MASS INDEX: 25.29 KG/M2

## 2020-06-10 ENCOUNTER — OFFICE VISIT (OUTPATIENT)
Dept: CARDIOLOGY CLINIC | Age: 65
End: 2020-06-10
Payer: COMMERCIAL

## 2020-06-10 VITALS
SYSTOLIC BLOOD PRESSURE: 130 MMHG | WEIGHT: 142 LBS | HEART RATE: 58 BPM | DIASTOLIC BLOOD PRESSURE: 60 MMHG | BODY MASS INDEX: 23.63 KG/M2 | OXYGEN SATURATION: 95 %

## 2020-06-10 PROBLEM — R00.1 SINUS BRADYCARDIA: Status: ACTIVE | Noted: 2020-06-10

## 2020-06-10 PROCEDURE — 93000 ELECTROCARDIOGRAM COMPLETE: CPT | Performed by: NURSE PRACTITIONER

## 2020-06-10 PROCEDURE — 99214 OFFICE O/P EST MOD 30 MIN: CPT | Performed by: NURSE PRACTITIONER

## 2020-06-10 NOTE — PROGRESS NOTES
[address]   Electrophysiology Note              Date:  Alma 10, 2020  Patient name: Zoltan Duque  YOB: 1955    Primary Care physician: Mohinder León MD    HISTORY OF PRESENT ILLNESS: The patient is a 59 y.o.  male with a history of frequent multifocal PVCs, NICM, HTN, nonobstructive CAD, tobacco abuse, prostate cancer, and sleep apnea. He was evaluated in 3/2019 for frequent PVCs. Echo in 4/2019 showed an EF of 45-50%. LHC in 4/2019 showed nonobstructive CAD. A 24 hour Holter in 6/2019 showed a 20% PVC burden. He was diagnosed with mild ARGELIA in 6/2019 and was started on CPAP. A 24 hour Holter in 10/2019 showed a 12% PVC burden. Today he is being seen for PVCs. EKG shows sinus bette with a HR of 45. He is feeling well and denies chest pain, palpitations, shortness of breath, and dizziness. Past Medical History:   has a past medical history of Abnormal echocardiogram, Hyperlipidemia, Hypertension, Prostate cancer ([address] Sleep apnea, and Smoker. Past Surgical History:   has a past surgical history that includes Prostatectomy (02/14/2012); Colonoscopy (3/22/12); Eye surgery (1989); Prostate biopsy; and Cardiac catheterization (04/23/2019). Home Medications:    Prior to Admission medications    Medication Sig Start Date End Date Taking? Authorizing Provider   nadolol (CORGARD) 20 MG tablet Take 1 tablet by mouth 2 times daily 3/5/20  Yes Mohinder León MD   pravastatin (PRAVACHOL) 40 MG tablet TAKE 1 TABLET DAILY 3/5/20  Yes Mohinder León MD   hydroCHLOROthiazide (HYDRODIURIL) 25 MG tablet TAKE 1 TABLET DAILY 3/5/20  Yes Mohinder León MD   isosorbide mononitrate (IMDUR) 30 MG extended release tablet TAKE ONE TABLET BY MOUTH DAILY 3/5/20  Yes Mohinder León MD   aspirin 325 MG tablet Take 325 mg by mouth daily   Yes Historical Provider, MD   niacin 500 MG tablet Take 500 mg by mouth daily.    Yes Historical Provider, MD       Allergies: Patient has no known allergies. Social History:   reports that he has been smoking cigarettes. He has a 7.50 pack-year smoking history. He uses smokeless tobacco. He reports current alcohol use. Family History: family history includes Diabetes in his father; Heart Disease in his father; Stroke in his father. Review of Systems   Constitutional: Negative. HENT: Negative. Eyes: Negative. Respiratory: Negative. Cardiovascular: see HPI  Gastrointestinal: Negative. Genitourinary: Negative. Musculoskeletal: Negative. Skin: Negative. Neurological: Negative. Hematological: Negative. Psychiatric/Behavioral: Negative. PHYSICAL EXAM:    Vital signs:    /60   Pulse 58   Wt 142 lb (64.4 kg)   SpO2 95%   BMI 23.63 kg/m²      Constitutional and general appearance: alert, cooperative, no distress and appears stated age  HEENT: PERRL, no cervical lymphadenopathy. No masses palpable. Normal oral mucosa  Respiratory:  · Normal excursion and expansion without use of accessory muscles  · Resp auscultation: Diminished breath sounds with few exp wheezes; no rhonchi or rales  Cardiovascular:  · The apical impulse is not displaced  · Heart tones are crisp and normal. regular S1 and S2.  · Jugular venous pulsation Normal  · The carotid upstroke is normal in amplitude and contour without delay or bruit  · Peripheral pulses are symmetrical and full   Abdomen:  · No masses or tenderness  · Bowel sounds present  Extremities:  ·  No cyanosis or clubbing  ·  No lower extremity edema  ·  Skin: warm and dry  Neurological:  · Alert and oriented  · Moves all extremities well  · No abnormalities of mood, affect, memory, mentation, or behavior are noted    DATA:    ECG 6/10/2020:  SB HR 45    Limited echo 8/1/2019:  Limited exam for LVEF. Left ventricular systolic function is normal with ejection fraction estimated at 55%. No regional wall motion abnormalities.   Compared to exam done 4/9/2019, left ventricular systolic function has improved and wall motion is better. Echo 4/9/2019:  Left ventricular systolic function is low normal with ejection fraction estimated at 45-50 %. Mild anterior, Anteroseptal and inferoseptal wall hypokinesia. Normal left ventricular wall thickness. Left ventricle size is normal.  Normal left ventricular diastolic filling pressure. The mitral valve normal in structure and function. Mild mitral regurgitation. The aortic valve sclerosis with normal opening. No evidence of aortic valve regurgitation. [facility] 4/23/2019 ([place]):  LM: no angiographic CAD  LAD: luminals  LCX: No angiographic CAD, JUSTIN-2 flow                OM-1 large, branching  RCA: dominant, prox/mid luminals. Justin-2 flow (5 beats to get flow distally, very slow to clear dye)     LVEDP: 10  LVEF: 55%     Assessment  1. Mild nonobstructive CAD  2. Cardiac microvascular disease as above with very poor flow in RCA and LAD. - medical management  3. QUIT smoking! All labs and testing reviewed. CARDIOLOGY LABS:   CBC: No results for input(s): WBC, HGB, HCT, PLT in the last 72 hours. BMP: No results for input(s): NA, K, CO2, BUN, CREATININE, LABGLOM, GLUCOSE in the last 72 hours. PT/INR: No results for input(s): PROTIME, INR in the last 72 hours. APTT:No results for input(s): APTT in the last 72 hours. FASTING LIPID PANEL:  Lab Results   Component Value Date    HDL 38 03/05/2020    LDLCALC 70 03/05/2020    TRIG 84 03/05/2020     LIVER PROFILE:No results for input(s): AST, ALT, ALB in the last 72 hours.         Assessment:   Frequent multifocal PVCs: improved    -24 hour Holter in 5/2019 showed a 20% burden   -24 hour Holter in 10/2019 showed a 12% PVC burden   -diagnosed with ARGELIA 7/2019  Sinus bradycardia: asymptomatic, stable   NICM: resolved   HTN: controlled   Nonobstructive CAD: stable   -s/p [facility] 4/2019  History of prostate cancer  Tobacco abuse: ongoing  Sleep apnea: mild   -compliant with

## 2020-08-28 ENCOUNTER — TELEPHONE (OUTPATIENT)
Dept: FAMILY MEDICINE CLINIC | Age: 65
End: 2020-08-28

## 2020-08-28 NOTE — TELEPHONE ENCOUNTER
----- Message from [place] Huynh sent at 8/28/2020  9:26 AM EDT -----  Subject: Appointment Request    Reason for Call: Routine Physical Exam    QUESTIONS  Type of Appointment? Established Patient  Reason for appointment request? No appointments available during search  Additional Information for Provider? Patient needs a 6 month check-up. He   is a patient there   but his old doctor left. Would it be possible for him to schedule with   [facility] still? The system wouldn't let me schedule. Please call him back. Thank you so much!   ---------------------------------------------------------------------------  --------------  CALL BACK INFO  What is the best way for the office to contact you? OK to leave message on   voicemail  Preferred Call Back Phone Number? [phone]  ---------------------------------------------------------------------------  --------------  SCRIPT ANSWERS  Relationship to Patient? Self  Appointment reason? Well Care/Follow Ups  Select a Well Care/Follow Ups appointment reason? Adult Physical Exam   [Medicare Annual Wellness   AWV   PAP   Pelvic]  (Is the patient requesting to be seen urgently for their symptoms?)? No  (If the patient is female   ask this question) Are you requesting a pap smear with your physical   exam? No  (Patient is Medicare and requesting Annual Wellness Visit)? No  Have you been diagnosed with   tested for   or told that you are suspected of having COVID-19 (Coronavirus)? No  Have you had a fever or taken medication to treat a fever within the past   3 days? No  Have you had a cough   shortness of breath or flu-like symptoms within the past 3 days? No  Do you currently have flu-like symptoms including fever or chills   cough   shortness of breath   or difficulty breathing   or new loss of taste or smell? No  (Service Expert  click yes below to proceed with INBEP As Usual   Scheduling)?  Yes

## 2020-09-03 ENCOUNTER — OFFICE VISIT (OUTPATIENT)
Dept: FAMILY MEDICINE CLINIC | Age: 65
End: 2020-09-03
Payer: COMMERCIAL

## 2020-09-03 VITALS
WEIGHT: 142 LBS | OXYGEN SATURATION: 97 % | HEART RATE: 44 BPM | DIASTOLIC BLOOD PRESSURE: 76 MMHG | SYSTOLIC BLOOD PRESSURE: 133 MMHG | BODY MASS INDEX: 23.63 KG/M2 | TEMPERATURE: 97.2 F

## 2020-09-03 PROBLEM — R73.01 IMPAIRED FASTING GLUCOSE: Status: ACTIVE | Noted: 2020-09-03

## 2020-09-03 LAB — HBA1C MFR BLD: 6.1 %

## 2020-09-03 PROCEDURE — 99213 OFFICE O/P EST LOW 20 MIN: CPT | Performed by: NURSE PRACTITIONER

## 2020-09-03 PROCEDURE — 83036 HEMOGLOBIN GLYCOSYLATED A1C: CPT | Performed by: NURSE PRACTITIONER

## 2020-09-03 ASSESSMENT — ENCOUNTER SYMPTOMS
BLOOD IN STOOL: 0
CONSTIPATION: 0
SHORTNESS OF BREATH: 0
COUGH: 0
CHEST TIGHTNESS: 0
DIARRHEA: 0

## 2020-09-03 ASSESSMENT — PATIENT HEALTH QUESTIONNAIRE - PHQ9
SUM OF ALL RESPONSES TO PHQ9 QUESTIONS 1 & 2: 0
1. LITTLE INTEREST OR PLEASURE IN DOING THINGS: 0
2. FEELING DOWN, DEPRESSED OR HOPELESS: 0
SUM OF ALL RESPONSES TO PHQ QUESTIONS 1-9: 0
SUM OF ALL RESPONSES TO PHQ QUESTIONS 1-9: 0

## 2020-09-03 NOTE — PATIENT INSTRUCTIONS
and cancer risks drop, much like yours. · They are sick less. For babies and small children, living smoke-free means they're less likely to have ear infections, pneumonia, and bronchitis. · If you're a woman who is or will be pregnant someday, quitting smoking means a healthier . · Children who are close to you are less likely to become adult smokers. Where can you learn more? Go to https://9factspeWish.Datezr. org and sign in to your [employer] account. Enter [phone] 78 in the [employer] box to learn more about \"Learning About Benefits From Quitting Smoking. \"     If you do not have an account, please click on the \"Sign Up Now\" link. Current as of: 2020               Content Version: 12.5  © 1763-8068 Healthwise, Incorporated. Care instructions adapted under license by [facility] [place] ([facility]). If you have questions about a medical condition or this instruction, always ask your healthcare professional. [place] [address] any warranty or liability for your use of this information.

## 2020-09-03 NOTE — PROGRESS NOTES
9/3/2020    Chief Complaint   Patient presents with    Hyperlipidemia    Hypertension    Medication Refill     does not need refills till March 21        Karie Cash is a 72 y.o. male, presents today for:    CAD/ Bradycardia with PVCs/ HTN  Overall doing well since last visit. Continuing to follow with Cardiology. Asymptomatic today, no CP, SOB, leg swelling. Does not need refills. Continuing to smoke 1/2ppd, not interested in quitting. HLD  Tolerating Atorvastatin well. No myalgias, leg pain, SOB. ARGELIA  Tolerating CPAP well, using every night. Review of Systems   Constitutional: Negative. Respiratory: Negative for cough, chest tightness and shortness of breath. Cardiovascular: Negative. Gastrointestinal: Negative for blood in stool, constipation and diarrhea. Skin: Negative. Neurological: Negative for dizziness, tremors, light-headedness and headaches. Psychiatric/Behavioral: Negative for decreased concentration, dysphoric mood, self-injury, sleep disturbance and suicidal ideas. The patient is not nervous/anxious. Current Outpatient Medications on File Prior to Visit   Medication Sig Dispense Refill    nadolol (CORGARD) 20 MG tablet Take 1 tablet by mouth 2 times daily 180 tablet 3    pravastatin (PRAVACHOL) 40 MG tablet TAKE 1 TABLET DAILY 90 tablet 3    hydroCHLOROthiazide (HYDRODIURIL) 25 MG tablet TAKE 1 TABLET DAILY 90 tablet 3    isosorbide mononitrate (IMDUR) 30 MG extended release tablet TAKE ONE TABLET BY MOUTH DAILY 90 tablet 3    aspirin 325 MG tablet Take 325 mg by mouth daily      niacin 500 MG tablet Take 500 mg by mouth daily. No current facility-administered medications on file prior to visit.        No Known Allergies  Past Medical History:   Diagnosis Date    Abnormal echocardiogram 04/2019    Hyperlipidemia     Hypertension     Prostate cancer ([facility] [address] 2012    total prostatectomy,Naeem    Sleep apnea     Smoker      Past Surgical History:   Procedure Laterality Date    CARDIAC CATHETERIZATION  04/23/2019    Non Obs CAD, medical management    COLONOSCOPY  3/22/12    EYE SURGERY  1989    eye trauma os with fixed pupil;vision is ok;    PROSTATE BIOPSY      PROSTATECTOMY  02/14/2012      Social History     Tobacco Use    Smoking status: Current Every Day Smoker     Packs/day: 0.25     Years: 30.00     Pack years: 7.50     Types: Cigarettes    Smokeless tobacco: Current User    Tobacco comment: 5 cigs per day   Substance Use Topics    Alcohol use: Yes     Alcohol/week: 0.0 standard drinks     Comment: rare      Family History   Problem Relation Age of Onset    Stroke Father     Heart Disease Father     Diabetes Father         Vitals:    09/03/20 1333   BP: 133/76   Pulse: (!) 44   Temp: 97.2 °F (36.2 °C)   TempSrc: Infrared   SpO2: 97%   Weight: 142 lb (64.4 kg)     Estimated body mass index is 23.63 kg/m² as calculated from the following:    Height as of 2/11/20: 5' 5\" (1.651 m). Weight as of this encounter: 142 lb (64.4 kg). Physical Exam  Vitals signs reviewed. Constitutional:       Appearance: Normal appearance. HENT:      Head: Normocephalic. Neck:      Vascular: No carotid bruit. Cardiovascular:      Rate and Rhythm: Normal rate and regular rhythm. Pulses: Normal pulses. Heart sounds: Normal heart sounds. No murmur. No gallop. Pulmonary:      Effort: Pulmonary effort is normal.      Breath sounds: Normal breath sounds. Abdominal:      General: Abdomen is flat. Palpations: Abdomen is soft. Musculoskeletal: Normal range of motion. Skin:     General: Skin is warm and dry. Capillary Refill: Capillary refill takes less than 2 seconds. Neurological:      General: No focal deficit present. Mental Status: He is alert and oriented to person, place, and time. Psychiatric:         Mood and Affect: Mood normal.         Behavior: Behavior normal.         ASSESSMENT/PLAN:  1.  Essential hypertension  Stable today  Encouraged patient to keep daily log of BP  Continue care with Cardio- appreciate their assistance  Encouraged patient to stop smoking    2. Sinus bradycardia  See above    3. ARGELIA (obstructive sleep apnea)  Tolerating CPAP well  Appreciate Pulmonary assistance    4. Hyperlipidemia LDL goal <130  Labs reviewed from 3/2020    5. Impaired fasting glucose  A1C today, 6.1%  - POCT glycosylated hemoglobin (Hb A1C)    6. Tobacco abuse  Encouraged cessation      Return in about 6 months (around 3/3/2021). Patient's questions answered and concerns addressed. Patient agrees to plan of care.           Electronically signed by OLIVIER Frederick CNP on 9/3/2020 at 2:07 PM

## 2020-09-21 ENCOUNTER — NURSE ONLY (OUTPATIENT)
Dept: FAMILY MEDICINE CLINIC | Age: 65
End: 2020-09-21
Payer: COMMERCIAL

## 2020-09-21 VITALS — TEMPERATURE: 97.7 F

## 2020-09-21 PROCEDURE — 90694 VACC AIIV4 NO PRSRV 0.5ML IM: CPT | Performed by: NURSE PRACTITIONER

## 2020-09-21 PROCEDURE — 90471 IMMUNIZATION ADMIN: CPT | Performed by: NURSE PRACTITIONER

## 2020-12-10 ENCOUNTER — OFFICE VISIT (OUTPATIENT)
Dept: CARDIOLOGY CLINIC | Age: 65
End: 2020-12-10
Payer: COMMERCIAL

## 2020-12-10 VITALS
WEIGHT: 144 LBS | SYSTOLIC BLOOD PRESSURE: 130 MMHG | HEART RATE: 51 BPM | DIASTOLIC BLOOD PRESSURE: 60 MMHG | OXYGEN SATURATION: 97 % | BODY MASS INDEX: 23.99 KG/M2 | HEIGHT: 65 IN

## 2020-12-10 PROCEDURE — 99214 OFFICE O/P EST MOD 30 MIN: CPT | Performed by: INTERNAL MEDICINE

## 2020-12-10 PROCEDURE — 93000 ELECTROCARDIOGRAM COMPLETE: CPT | Performed by: INTERNAL MEDICINE

## 2020-12-10 RX ORDER — NADOLOL 20 MG/1
20 TABLET ORAL 2 TIMES DAILY
Qty: 180 TABLET | Refills: 3 | Status: CANCELLED | OUTPATIENT
Start: 2020-12-10

## 2020-12-10 RX ORDER — ISOSORBIDE MONONITRATE 30 MG/1
TABLET, EXTENDED RELEASE ORAL
Qty: 90 TABLET | Refills: 3 | Status: CANCELLED | OUTPATIENT
Start: 2020-12-10

## 2020-12-10 NOTE — PROGRESS NOTES
[facility]   Electrophysiology Note      Date: 12/10/20  Patient Name: Mehrdad Gong  YOB: 1955     Chief Complaint:  6 Month Follow-Up and Other (premature ventricular complexes)    Primary Care Physician: OLIVIER Durán CNP     HISTORY OF PRESENT ILLNESS: Mehrdad Gong is a 72 y.o. male who presents for follow up for multi focal premature ventricular complexes. He has a past medical history of PVC's, NICM, HTN, non-obstructive CAD, and ARGELIA. He was evaluated in 3/2019 for frequent PVC's. Echo 4/9/19 demonstrated EF of 45-50%. Left heart cath on 4/23/19 demonstrated mild non-obstructive CAD. He wore a 24 HR cardiac event monitor from 6/2019 demonstrated PVC burden of 20%. He was diagnosed with ARGELIA in 6/2019 and CPAP was started. A 24 Hour cardiac event monitor from 10/2019 demonstrated PVC burden of 12%. His EKG today 12/10/20 demonstrates SB with HR 51 BPM. Today 12/10/20 he reports he is feeling well from a cardiac standpoint. He reports he has not had recurrence of his palpitations or PVC's since his last office visit and being on the nadolol. He reports he is taking his medications as prescribed and tolerates them well. He reports he is active around his home and tolerates activity well. He reports he has significantly decreased the amount of cigarettes he smokes daily. Patient denies current edema, chest pain, sob, palpitations, dizziness or syncope. Past Medical History:   has a past medical history of Abnormal echocardiogram, Hyperlipidemia, Hypertension, Prostate cancer ([address] Sleep apnea, and Smoker. Past Surgical History:   has a past surgical history that includes Prostatectomy (02/14/2012); Colonoscopy (3/22/12); Eye surgery (1989); Prostate biopsy; and Cardiac catheterization (04/23/2019). Social History:   reports that he has been smoking cigarettes. He has a 7.50 pack-year smoking history.  He uses smokeless tobacco. He reports current alcohol with HR 51 BPM    ECHO: (limited) 8/1/19: Summary   Limited exam for LVEF. Left ventricular systolic function is normal with ejection fraction   estimated at 55%. No regional wall motion abnormalities. Compared to exam done 4/9/2019, left ventricular systolic function has   improved and wall motion is better. ECHO: 4/9/19:  Summary   Left ventricular systolic function is low normal with ejection fraction   estimated at 45-50 %. Mild anterior,Anteroseptal and inferoseptal wall   hypokinesia. Normal left ventricular wall thickness. Left ventricle size is normal.   Normal left ventricular diastolic filling pressure. The mitral valve normal in structure and function. Mild mitral regurgitation. The aortic valve sclerosis with normal opening. No evidence of aortic valve regurgitation. LA normal in size    IMPRESSION:    1. Premature Ventricular Complexes  12/10/2020  Patient is a pleasant 77-year-old male with a medical history significant for multifocal premature ventricular contractions, obstructive sleep apnea, tobacco abuse, and nonischemic cardiomyopathy who presents from home to [facility]. Patient is been doing well. His EKG today shows no PVCs. His cardiomyopathy has resolved. EP standpoint patient is in good position. We will asked that he follow-up with us yearly. - Continue nadolol.  - Follow up with EP NP in 1 year unless/until procedure/discussion required or PRN. 2. Sinus bradycardia on nadolol. Asymptomatic. Heart rate 50s. No fatigue or exercise intolerance. - Continue to follow clinically. - Plan as per above. RECOMMENDATIONS:  1. Continue nadolol and isosorbide for PVC's, refills declined today  2. Follow up with EP NP in 1 year     QUALITY MEASURES  1. Tobacco Cessation Counseling: Yes  2. Retake of BP if >140/90:   NA  3. Documentation to PCP/referring for new patient:  Sent to PCP at close of office visit  4. CAD patient on anti-platelet: NA  5.  CAD patient on STATIN therapy:  NA  6. Patient with CHF and aFib on anticoagulation:  NA     All questions and concerns were addressed to the patient/family. Alternatives to my treatment were discussed. This note was scribed in the presence of Jackie Hendrix MD by Adriane Ribera. Michelle Navarro RN. Dr. Shady Duenas MD  Electrophysiology  [facility]. [facility] [facility]. [address]. [place], [address]  Phone: [phone]  Fax: [phone]   12/10/2020     NOTE: This report was transcribed using voice recognition software. Every effort was made to ensure accuracy, however, inadvertent computerized transcription errors may be present. The scribe's documentation has been prepared under my direction and personally reviewed by me in its entirety. I confirm that the note above accurately reflects all work, physical examination, the discussion of treatments and procedures, and medical decision making performed by me. Tomás Foster MD personally performed the services described in this documentation as scribed by nurse in my presence, and is both accurate and complete.     Electronically signed by Maycol Alberts MD on 12/10/2020 at 3:58 PM

## 2020-12-10 NOTE — PATIENT INSTRUCTIONS
RECOMMENDATIONS:  1. Continue nadolol and isosorbide for PVC's, refills given as warranted  2.  Follow up with EP NP in 1 year

## 2020-12-10 NOTE — LETTER
[address]   Electrophysiology Note      Date: 12/10/20  Patient Name: Mercedes Tobias  YOB: 1955     Chief Complaint:  6 Month Follow-Up and Other (premature ventricular complexes)    Primary Care Physician: OLIVIER Ramirez CNP     HISTORY OF PRESENT ILLNESS: Mercedes Tobias is a 72 y.o. male who presents for follow up for multi focal premature ventricular complexes. He has a past medical history of PVC's, NICM, HTN, non-obstructive CAD, and ARGELIA. He was evaluated in 3/2019 for frequent PVC's. Echo 4/9/19 demonstrated EF of 45-50%. Left heart cath on 4/23/19 demonstrated mild non-obstructive CAD. He wore a 24 HR cardiac event monitor from 6/2019 demonstrated PVC burden of 20%. He was diagnosed with ARGELIA in 6/2019 and CPAP was started. A 24 Hour cardiac event monitor from 10/2019 demonstrated PVC burden of 12%. His EKG today 12/10/20 demonstrates SB with HR 51 BPM. Today 12/10/20 he reports he is feeling well from a cardiac standpoint. He reports he has not had recurrence of his palpitations or PVC's since his last office visit and being on the nadolol. He reports he is taking his medications as prescribed and tolerates them well. He reports he is active around his home and tolerates activity well. He reports he has significantly decreased the amount of cigarettes he smokes daily. Patient denies current edema, chest pain, sob, palpitations, dizziness or syncope. Past Medical History:   has a past medical history of Abnormal echocardiogram, Hyperlipidemia, Hypertension, Prostate cancer ([address] Sleep apnea, and Smoker. Past Surgical History:   has a past surgical history that includes Prostatectomy (02/14/2012); Colonoscopy (3/22/12); Eye surgery (1989); Prostate biopsy; and Cardiac catheterization (04/23/2019). Social History:   reports that he has been smoking cigarettes. He has a 7.50 pack-year smoking history.  He uses smokeless tobacco. He reports current alcohol use. Family History: family history includes Diabetes in his father; Heart Disease in his father; Stroke in his father. Allergies:  Patient has no known allergies. Home Medications:    Prior to Admission medications    Medication Sig Start Date End Date Taking? Authorizing Provider   nadolol (CORGARD) 20 MG tablet Take 1 tablet by mouth 2 times daily 3/5/20  Yes Shayla Ortiz MD   pravastatin (PRAVACHOL) 40 MG tablet TAKE 1 TABLET DAILY 3/5/20  Yes Shayla Ortiz MD   hydroCHLOROthiazide (HYDRODIURIL) 25 MG tablet TAKE 1 TABLET DAILY 3/5/20  Yes Shayla Ortiz MD   isosorbide mononitrate (IMDUR) 30 MG extended release tablet TAKE ONE TABLET BY MOUTH DAILY 3/5/20  Yes Shayla Ortiz MD   aspirin 325 MG tablet Take 325 mg by mouth daily   Yes Historical Provider, MD   niacin 500 MG tablet Take 500 mg by mouth daily. Yes Historical Provider, MD       REVIEW OF SYSTEMS:      All 14-point review of systems are completed and pertinent positives are mentioned in the history of present illness. Other systems are reviewed and are negative. Physical Examination:    /60   Pulse 51   Ht 5' 5\" (1.651 m)   Wt 144 lb (65.3 kg)   SpO2 97%   BMI 23.96 kg/m²    Constitutional and General Appearance: alert, cooperative, no distress and appears stated age  HEENT: PERRL, no cervical lymphadenopathy. No masses palpable. Normal oral mucosa  Respiratory:  · Normal excursion and expansion without use of accessory muscles  · Resp Auscultation: Normal breath sounds without dullness or wheezing  Cardiovascular:  · The apical impulse is not displaced  · RRR S1S2 w/o M/G/R  Abdomen:  · No masses or tenderness  · Bowel sounds present  Extremities:  ·  No Cyanosis or Clubbing  ·  Lower extremity edema: No  · Skin: Warm and dry  Neurological:  · Alert and oriented.   · Moves all extremities well  · No abnormalities of mood, affect, memory, mentation, or behavior are noted    DATA: EC/10/20: SB with HR 51 BPM    ECHO: (limited) 19: Summary   Limited exam for LVEF. Left ventricular systolic function is normal with ejection fraction   estimated at 55%. No regional wall motion abnormalities. Compared to exam done 2019, left ventricular systolic function has   improved and wall motion is better. ECHO: 19:  Summary   Left ventricular systolic function is low normal with ejection fraction   estimated at 45-50 %. Mild anterior,Anteroseptal and inferoseptal wall   hypokinesia. Normal left ventricular wall thickness. Left ventricle size is normal.   Normal left ventricular diastolic filling pressure. The mitral valve normal in structure and function. Mild mitral regurgitation. The aortic valve sclerosis with normal opening. No evidence of aortic valve regurgitation. LA normal in size    IMPRESSION:    1. Premature Ventricular Complexes  12/10/2020  Patient is a pleasant 69-year-old male with a medical history significant for multifocal premature ventricular contractions, obstructive sleep apnea, tobacco abuse, and nonischemic cardiomyopathy who presents from home to \[facility]\"" care. Patient is been doing well. His EKG today shows no PVCs. His cardiomyopathy has resolved. EP standpoint patient is in good position. We will asked that he follow-up with us yearly. - Continue nadolol.  - Follow up with EP NP in 1 year unless/until procedure/discussion required or PRN. 2. Sinus bradycardia on nadolol. Asymptomatic. Heart rate 50s. No fatigue or exercise intolerance. - Continue to follow clinically. - Plan as per above. RECOMMENDATIONS:  1. Continue nadolol and isosorbide for PVC's, refills declined today  2. Follow up with EP NP in 1 year     QUALITY MEASURES  1. Tobacco Cessation Counseling: Yes  2. Retake of BP if >140/90:   NA  3.  Documentation to PCP/referring for new patient:  Sent to PCP at close of office visit 4. CAD patient on anti-platelet: NA  5. CAD patient on STATIN therapy:  NA  6. Patient with CHF and aFib on anticoagulation:  NA     All questions and concerns were addressed to the patient/family. Alternatives to my treatment were discussed. This note was scribed in the presence of Zaheer Negro MD by Reese Tinoco RN. Dr. Sherrie Max MD  Electrophysiology  [address]. [address] [place] [address], [facility] [place]  Phone: [phone]  Fax: [phone]   12/10/2020     NOTE: This report was transcribed using voice recognition software. Every effort was made to ensure accuracy, however, inadvertent computerized transcription errors may be present. The scribe's documentation has been prepared under my direction and personally reviewed by me in its entirety. I confirm that the note above accurately reflects all work, physical examination, the discussion of treatments and procedures, and medical decision making performed by me. Myranda Lima MD personally performed the services described in this documentation as scribed by nurse in my presence, and is both accurate and complete.     Electronically signed by Saumya Vaughn MD on 12/10/2020 at 3:58 PM

## 2021-01-14 ENCOUNTER — TELEPHONE (OUTPATIENT)
Dept: FAMILY MEDICINE CLINIC | Age: 66
End: 2021-01-14

## 2021-01-14 DIAGNOSIS — E78.5 HYPERLIPIDEMIA LDL GOAL <130: ICD-10-CM

## 2021-01-14 DIAGNOSIS — I10 ESSENTIAL HYPERTENSION: ICD-10-CM

## 2021-01-14 RX ORDER — PRAVASTATIN SODIUM 40 MG
TABLET ORAL
Qty: 90 TABLET | Refills: 0 | Status: SHIPPED | OUTPATIENT
Start: 2021-01-14 | End: 2021-03-02

## 2021-01-14 RX ORDER — HYDROCHLOROTHIAZIDE 25 MG/1
TABLET ORAL
Qty: 90 TABLET | Refills: 0 | Status: SHIPPED | OUTPATIENT
Start: 2021-01-14 | End: 2021-03-02

## 2021-01-14 RX ORDER — NADOLOL 20 MG/1
20 TABLET ORAL 2 TIMES DAILY
Qty: 180 TABLET | Refills: 0 | Status: SHIPPED | OUTPATIENT
Start: 2021-01-14 | End: 2021-05-18

## 2021-01-14 RX ORDER — ISOSORBIDE MONONITRATE 30 MG/1
TABLET, EXTENDED RELEASE ORAL
Qty: 90 TABLET | Refills: 0 | Status: SHIPPED | OUTPATIENT
Start: 2021-01-14 | End: 2021-05-18 | Stop reason: SDUPTHER

## 2021-01-14 NOTE — TELEPHONE ENCOUNTER
Patient calling, would like the sleep study and CPAP readings printed out for him. He has an appointment coming up and would like to take them with him. Didn't know what I was looking for, so told him we would have to look and call back. Please advise.

## 2021-01-26 ENCOUNTER — TELEPHONE (OUTPATIENT)
Dept: PULMONOLOGY | Age: 66
End: 2021-01-26

## 2021-01-26 ENCOUNTER — VIRTUAL VISIT (OUTPATIENT)
Dept: PULMONOLOGY | Age: 66
End: 2021-01-26
Payer: MEDICARE

## 2021-01-26 DIAGNOSIS — G47.33 OSA (OBSTRUCTIVE SLEEP APNEA): Primary | ICD-10-CM

## 2021-01-26 DIAGNOSIS — Z71.89 CPAP USE COUNSELING: ICD-10-CM

## 2021-01-26 PROCEDURE — 99441 PR PHYS/QHP TELEPHONE EVALUATION 5-10 MIN: CPT | Performed by: NURSE PRACTITIONER

## 2021-01-26 ASSESSMENT — SLEEP AND FATIGUE QUESTIONNAIRES
HOW LIKELY ARE YOU TO NOD OFF OR FALL ASLEEP WHEN YOU ARE A PASSENGER IN A CAR FOR AN HOUR WITHOUT A BREAK: 0
HOW LIKELY ARE YOU TO NOD OFF OR FALL ASLEEP WHILE SITTING QUIETLY AFTER LUNCH WITHOUT ALCOHOL: 0
ESS TOTAL SCORE: 1
HOW LIKELY ARE YOU TO NOD OFF OR FALL ASLEEP WHILE LYING DOWN TO REST IN THE AFTERNOON WHEN CIRCUMSTANCES PERMIT: 1
HOW LIKELY ARE YOU TO NOD OFF OR FALL ASLEEP WHILE SITTING INACTIVE IN A PUBLIC PLACE: 0

## 2021-01-26 NOTE — TELEPHONE ENCOUNTER
.Within this Telehealth Consent, the terms you and yours refer to the person using the Telehealth Service (Service), or in the case of a use of the Service by or on behalf of a minor, you and yours refer to and include (i) the parent or legal guardian who provides consent to the use of the Service by such minor or uses the Service on behalf of such minor, and (ii) the minor for whom consent is being provided or on whose behalf the Service is being utilized. When using Service, you will be consulting with your health care providers via the use of Telehealth.   Telehealth involves the delivery of healthcare services using electronic communications, information technology or other means between a healthcare provider and a patient who are not in the same physical location. Telehealth may be used for diagnosis, treatment, follow-up and/or patient education, and may include, but is not limited to, one or more of the following:    Electronic transmission of medical records, photo images, personal health information or other data between a patient and a healthcare provider    Interactions between a patient and healthcare provider via audio, video and/or data communications    Use of output data from medical devices, sound and video files    Anticipated Benefits   The use of Telehealth by your Provider(s) through the Service may have the following possible benefits:    Making it easier and more efficient for you to access medical care and treatment for the conditions treated by such Provider(s) utilizing the Service    Allowing you to obtain medical care and treatment by Provider(s) at times that are convenient for you    Enabling you to interact with Provider(s) without the necessity of an in-office appointment     Possible Risks   While the use of Telehealth can provide potential benefits for you, there are also potential risks associated with the use of Telehealth.  These risks include, but may not be limited to the following:    Your Provider(s) may not able to provide medical treatment for your particular condition and you may be required to seek alternative healthcare or emergency care services.  The electronic systems or other security protocols or safeguards used in the Service could fail, causing a breach of privacy of your medical or other information.  Given regulatory requirements in certain jurisdictions, your Provider(s) diagnosis and/or treatment options, especially pertaining to certain prescriptions, may be limited. Acceptance   1. You understand that Services will be provided via Telehealth. This process involves the use of HIPAA compliant and secure, real-time audio-visual interfacing with a qualified and appropriately trained provider located at [facility]. 2. You understand that, under no circumstances, will this session be recorded. 3. You understand that the Provider(s) at [facility] and other clinical participants will be party to the information obtained during the Telehealth session in accordance with best medical practices. 4. You understand that the information obtained during the Telehealth session will be used to help determine the most appropriate treatment options. 5. You understand that You have the right to revoke this consent at any point in time. 6. You understand that Telehealth is voluntary, and that continued treatment is not dependent upon consent. 7. You understand that, in the event of non-consent to Telehealth services and/or technical difficulties, you will obtain services as typically provided in the absence of Telehealth technology. 8. You understand that this consent will be kept in Your medical record. 9. No potential benefits from the use of Telehealth or specific results can be guaranteed. Your condition may not be cured or improved and, in some cases, may get worse.    10. There are limitations in the provision of medical care and treatment via Telehealth and the Service and you may not be able to receive diagnosis and/or treatment through the Service for every condition for which you seek diagnosis and/or treatment. 11. There are potential risks to the use of Telehealth, including but not limited to the risks described in this Telehealth Consent. 12. Your Provider(s) have discussed the use of Telehealth and the Service with you, including the benefits and risks of such and you have provided oral consent to your Provider(s) for the use of Telehealth and the Service. 15. You understand that it is your duty to provide your Provider(s) truthful, accurate and complete information, including all relevant information regarding care that you may have received or may be receiving from other healthcare providers outside of the Service. 14. You understand that each of your Provider(s) may determine in his or sole discretion that your condition is not suitable for diagnosis and/or treatment using the Service, and that you may need to seek medical care and treatment a specialist or other healthcare provider, outside of the Service. 15. You understand that you are fully responsible for payment for all services provided by Provider(s) or through use of the Service and that you may not be able to use third-party insurance. 16. You represent that (a) you have read this Telehealth Consent carefully, (b) you understand the risks and benefits of the Service and the use of Telehealth in the medical care and treatment provided to you by Provider(s) using the Service, and (c) you have the legal capacity and authority to provide this consent for yourself and/or the minor for which you are consenting under applicable federal and state laws, including laws relating to the age of [de-identified] and/or parental/guardian consent.    17. You give your informed consent to the use of Telehealth by Provider(s) using the Service under the terms described in the Terms of Service and this Telehealth Consent. The patient was read the following statement and has consented to the visit as of 1/26/21. The patient has been scheduled for their first telehealth visit on 1/26/21 with Malaika Gar.

## 2021-01-26 NOTE — PROGRESS NOTES
Patient ID: Chari Singer is a 72 y.o. male who is being seen today for   Chief Complaint   Patient presents with    Sleep Apnea     1 year sleep          HPI:     Chari Singer is a 72 y.o. male for telephone only virtual visit for ARGELIA follow up. States he is doing well with CPAP. Patient is using CPAP   6-7 hrs/night. Using humidifier. No snoring on CPAP. The pressure is well tolerated. The mask is comfortable- nasal pillows with chin strap. No mask leak. No significant daytime sleepiness. No nodding off when driving. Minimal dry mouth. No fatigue. Bedtime is 10-11 pm and rise time is 5 am. Sleep onset is usually few minutes. Wakes up 0-1 times at night total. 0-1 nocturia. It takes few minutes to fall back a sleep. Rare naps during the day. No headache in am. No weight gain. 1-2 caffienated beverages during the day. No alcohol. ESS is 1.     Sleep Medicine 1/26/2021 2/11/2020 11/5/2019 6/28/2019   Sitting and reading 0 1 0 3   Watching TV 0 1 2 2   Sitting, inactive in a public place (e.g. a theatre or a meeting) 0 0 0 3   As a passenger in a car for an hour without a break 0 0 0 0   Lying down to rest in the afternoon when circumstances permit 1 1 3 2   Sitting and talking to someone 0 0 0 0   Sitting quietly after a lunch without alcohol 0 0 0 1   In a car, while stopped for a few minutes in traffic 0 0 0 0   Total score 1 3 5 11   Neck circumference - 14.5 14 14       Past Medical History:  Past Medical History:   Diagnosis Date    Abnormal echocardiogram 04/2019    Hyperlipidemia     Hypertension     Prostate cancer ([facility] [address] 2012    total prostatectomy,DrJaciely    Sleep apnea     Smoker        Past Surgical History:        Procedure Laterality Date    CARDIAC CATHETERIZATION  04/23/2019    Non Obs CAD, medical management    COLONOSCOPY  3/22/12    EYE SURGERY  1989    eye trauma os with fixed pupil;vision is ok;    PROSTATE BIOPSY      PROSTATECTOMY  02/14/2012 Allergies:  has No Known Allergies. Social History:    TOBACCO:   reports that he has been smoking cigarettes. He has a 15.00 pack-year smoking history. He uses smokeless tobacco.  ETOH:   reports current alcohol use. Family History:       Problem Relation Age of Onset    Stroke Father     Heart Disease Father     Diabetes Father        Current Medications:    Current Outpatient Medications:     nadolol (CORGARD) 20 MG tablet, Take 1 tablet by mouth 2 times daily, Disp: 180 tablet, Rfl: 0    pravastatin (PRAVACHOL) 40 MG tablet, TAKE 1 TABLET DAILY, Disp: 90 tablet, Rfl: 0    hydroCHLOROthiazide (HYDRODIURIL) 25 MG tablet, TAKE 1 TABLET DAILY, Disp: 90 tablet, Rfl: 0    isosorbide mononitrate (IMDUR) 30 MG extended release tablet, TAKE ONE TABLET BY MOUTH DAILY, Disp: 90 tablet, Rfl: 0    aspirin 325 MG tablet, Take 325 mg by mouth daily, Disp: , Rfl:     niacin 500 MG tablet, Take 500 mg by mouth daily. , Disp: , Rfl:       Objective:   PHYSICAL EXAM:    There were no vitals taken for this visit. Physical exam:         DATA:   7/25/19 PSG AHI 10.6/REM AHI 50.9, low SPO2 81%, PLMS 16.7  8/27/2019 controlled ARGELIA on CPAP, PLMS 23.1, recommendation CPAP 9 cm H2O    CPAP compliance data:  Compliance download report from 1/12/20 to 2/10/20 reviewed today by me and showed patient is using machine 6:32 hrs/night with 100% compliance and AHI 0.8 within this time frame. 30/30days with greater than 4 hours of machine use. CPAP 9 cm H20. Compliance download report from 12/26/20 to 1/24/20 reviewed today by me and showed patient is using machine 6:08 hrs/night with 93% compliance and AHI 0.7 within this time frame. 28/30days with greater than 4 hours of machine use.   CPAP 9 cm H20 Compliance download report from 12/26/20 to 1/24/20 reviewed today by me and showed patient is using machine 6:08 hrs/night with 93% compliance and AHI 0.7 within this time frame. 28/30days with greater than 4 hours of machine use. CPAP 9 cm H20     Assessment:      · Mild ARGELIA. CPAP 9 cm H2O. Optimal compliance and efficacy on review today  · PLMSdenies RLS symptoms  · Hypertension, CAD, PVCs- followed by cardiology  · DOT     Plan:       - Continue CPAP 9 cm H2O  - Advised to use CPAP 6-8 hrs at night and during naps. - Replacement of mask, tubing, head straps every 3-6 months or sooner if damaged. - Patient instructed to contact [employer] for any mask, tubing or machine trouble shooting if problems arise.  - Sleep hygiene  - Avoid sedatives, alcohol and caffeinated drinks at bed time. - Patient counseled to never drive or operate heavy machinery while fatigue, drowsy or sleepy or if has not used CPAP at least 4 hours the night prior-patient verbalized understanding and agrees. - Weight loss is recommended as a long-term intervention.    - Complications of ARGELIA if not treated were discussed with patient patient, including: systemic hypertension, pulmonary hypertension, cardiovascular morbidities, car accidents and all cause mortality.  -Patient education provided regarding sleep tips and CPAP cleaning recommendations     Follow up: 1 year, sooner if needed    Consent for telehealth visit was obtained and is noted in chart    Joy Grace is a 72 y.o. male evaluated via telephone on 1/26/2021. Consent:  He and/or health care decision maker is aware that that he may receive a bill for this telephone service, depending on his insurance coverage, and has provided verbal consent to proceed: Yes      I affirm this is a Patient Initiated Episode with a Patient who has not had a related appointment within my department in the past 7 days or scheduled within the next 24 hours. Patient identification was verified at the start of the visit: Yes    Total Time: minutes: 5-10 minutes    Note: not billable if this call serves to triage the patient into an appointment for the relevant concern      Rhea Salazar

## 2021-01-26 NOTE — PATIENT INSTRUCTIONS

## 2021-02-09 ENCOUNTER — TELEPHONE (OUTPATIENT)
Dept: PULMONOLOGY | Age: 66
End: 2021-02-09

## 2021-02-09 ENCOUNTER — TELEPHONE (OUTPATIENT)
Dept: FAMILY MEDICINE CLINIC | Age: 66
End: 2021-02-09

## 2021-02-09 NOTE — TELEPHONE ENCOUNTER
Lizbeth Méndez         1:11 PM  Note     ----- Message from Akilah sent at 2/8/2021 12:48 PM EST -----  Subject: Message to Provider     QUESTIONS  Information for Provider? The patient got results back and wanting either   the  or Jared Vaughn to give him a call back. He is stating that from the   results his blood is not clotting and wanting to know if he can take the   COVID VACCINE> PLease call back. ---------------------------------------------------------------------------  --------------  Raechel Lombard INFO  What is the best way for the office to contact you? OK to leave message on   voicemail  Preferred Call Back Phone Number? [phone]  ---------------------------------------------------------------------------  --------------  SCRIPT ANSWERS  Relationship to Patient? Self                  1:11 PM  Lizbeth Méndez routed this conversation to OLIVIER Smith CNP, APRN - CNP  to [address]        2:47 PM  Note     Called patient. Reassurance provided regarding  mg use. Pt planning to still obtain COVID vaccination .

## 2021-02-23 ENCOUNTER — NURSE ONLY (OUTPATIENT)
Dept: FAMILY MEDICINE CLINIC | Age: 66
End: 2021-02-23
Payer: MEDICARE

## 2021-02-23 DIAGNOSIS — I10 ESSENTIAL HYPERTENSION: ICD-10-CM

## 2021-02-23 DIAGNOSIS — R73.01 IMPAIRED FASTING GLUCOSE: ICD-10-CM

## 2021-02-23 DIAGNOSIS — Z85.46 HISTORY OF MALIGNANT NEOPLASM OF PROSTATE: ICD-10-CM

## 2021-02-23 DIAGNOSIS — Z11.4 ENCOUNTER FOR SCREENING FOR HIV: ICD-10-CM

## 2021-02-23 DIAGNOSIS — E78.5 HYPERLIPIDEMIA LDL GOAL <130: ICD-10-CM

## 2021-02-23 LAB
A/G RATIO: 1.9 (ref 1.1–2.2)
ALBUMIN SERPL-MCNC: 4.5 G/DL (ref 3.4–5)
ALP BLD-CCNC: 69 U/L (ref 40–129)
ALT SERPL-CCNC: 25 U/L (ref 10–40)
ANION GAP SERPL CALCULATED.3IONS-SCNC: 9 MMOL/L (ref 3–16)
AST SERPL-CCNC: 19 U/L (ref 15–37)
BASOPHILS ABSOLUTE: 0 K/UL (ref 0–0.2)
BASOPHILS RELATIVE PERCENT: 0.5 %
BILIRUB SERPL-MCNC: <0.2 MG/DL (ref 0–1)
BUN BLDV-MCNC: 22 MG/DL (ref 7–20)
CALCIUM SERPL-MCNC: 10 MG/DL (ref 8.3–10.6)
CHLORIDE BLD-SCNC: 102 MMOL/L (ref 99–110)
CHOLESTEROL, TOTAL: 121 MG/DL (ref 0–199)
CO2: 28 MMOL/L (ref 21–32)
CREAT SERPL-MCNC: 0.7 MG/DL (ref 0.8–1.3)
EOSINOPHILS ABSOLUTE: 0.3 K/UL (ref 0–0.6)
EOSINOPHILS RELATIVE PERCENT: 5 %
GFR AFRICAN AMERICAN: >60
GFR NON-AFRICAN AMERICAN: >60
GLOBULIN: 2.4 G/DL
GLUCOSE BLD-MCNC: 109 MG/DL (ref 70–99)
HCT VFR BLD CALC: 45.3 % (ref 40.5–52.5)
HDLC SERPL-MCNC: 36 MG/DL (ref 40–60)
HEMOGLOBIN: 15.4 G/DL (ref 13.5–17.5)
LDL CHOLESTEROL CALCULATED: 60 MG/DL
LYMPHOCYTES ABSOLUTE: 1.9 K/UL (ref 1–5.1)
LYMPHOCYTES RELATIVE PERCENT: 31.1 %
MCH RBC QN AUTO: 32.3 PG (ref 26–34)
MCHC RBC AUTO-ENTMCNC: 34.1 G/DL (ref 31–36)
MCV RBC AUTO: 94.8 FL (ref 80–100)
MONOCYTES ABSOLUTE: 0.6 K/UL (ref 0–1.3)
MONOCYTES RELATIVE PERCENT: 10 %
NEUTROPHILS ABSOLUTE: 3.2 K/UL (ref 1.7–7.7)
NEUTROPHILS RELATIVE PERCENT: 53.4 %
PDW BLD-RTO: 13.6 % (ref 12.4–15.4)
PLATELET # BLD: 215 K/UL (ref 135–450)
PMV BLD AUTO: 8.6 FL (ref 5–10.5)
POTASSIUM SERPL-SCNC: 3.9 MMOL/L (ref 3.5–5.1)
PROSTATE SPECIFIC ANTIGEN: <0.01 NG/ML (ref 0–4)
RBC # BLD: 4.77 M/UL (ref 4.2–5.9)
SODIUM BLD-SCNC: 139 MMOL/L (ref 136–145)
TOTAL PROTEIN: 6.9 G/DL (ref 6.4–8.2)
TRIGL SERPL-MCNC: 123 MG/DL (ref 0–150)
VLDLC SERPL CALC-MCNC: 25 MG/DL
WBC # BLD: 6 K/UL (ref 4–11)

## 2021-02-23 PROCEDURE — 36415 COLL VENOUS BLD VENIPUNCTURE: CPT | Performed by: NURSE PRACTITIONER

## 2021-02-23 NOTE — PROGRESS NOTES
Blood drawn per order. Needle size: 23 g butterfly  Site: R Antecubital.  First attempt successful Yes    Second attempt na    Pressure applied until bleeding stopped. Cotton ball and band aid  applied. Patient informed to call office or return if bleeding reoccurs and unable to stop.     Tubes drawn: 2 purple     2 red

## 2021-02-24 LAB
HIV AG/AB: NORMAL
HIV ANTIGEN: NORMAL
HIV-1 ANTIBODY: NORMAL
HIV-2 AB: NORMAL

## 2021-02-24 ASSESSMENT — PATIENT HEALTH QUESTIONNAIRE - PHQ9
2. FEELING DOWN, DEPRESSED OR HOPELESS: 0
SUM OF ALL RESPONSES TO PHQ9 QUESTIONS 1 & 2: 0
SUM OF ALL RESPONSES TO PHQ QUESTIONS 1-9: 0
SUM OF ALL RESPONSES TO PHQ QUESTIONS 1-9: 0
1. LITTLE INTEREST OR PLEASURE IN DOING THINGS: 0

## 2021-02-28 DIAGNOSIS — I10 ESSENTIAL HYPERTENSION: ICD-10-CM

## 2021-02-28 DIAGNOSIS — E78.5 HYPERLIPIDEMIA LDL GOAL <130: ICD-10-CM

## 2021-03-01 NOTE — TELEPHONE ENCOUNTER
Future appt scheduled 03/02/2021 (tomorrow)                Last appt 09/03/2020        Last Written       pravastatin (PRAVACHOL) 40 MG tablet  01/14/2021  #90  0 RF       hydroCHLOROthiazide (HYDRODIURIL) 25 MG tablet  01/14/2021  #90  0 RF

## 2021-03-02 ENCOUNTER — OFFICE VISIT (OUTPATIENT)
Dept: FAMILY MEDICINE CLINIC | Age: 66
End: 2021-03-02
Payer: MEDICARE

## 2021-03-02 VITALS
TEMPERATURE: 97.8 F | SYSTOLIC BLOOD PRESSURE: 132 MMHG | HEART RATE: 47 BPM | WEIGHT: 143 LBS | BODY MASS INDEX: 23.82 KG/M2 | DIASTOLIC BLOOD PRESSURE: 82 MMHG | HEIGHT: 65 IN | OXYGEN SATURATION: 98 %

## 2021-03-02 DIAGNOSIS — I10 ESSENTIAL HYPERTENSION: Primary | ICD-10-CM

## 2021-03-02 DIAGNOSIS — G47.33 OSA (OBSTRUCTIVE SLEEP APNEA): ICD-10-CM

## 2021-03-02 DIAGNOSIS — Z23 NEED FOR STREPTOCOCCUS PNEUMONIAE VACCINATION: ICD-10-CM

## 2021-03-02 DIAGNOSIS — Z00.00 ROUTINE GENERAL MEDICAL EXAMINATION AT A HEALTH CARE FACILITY: ICD-10-CM

## 2021-03-02 DIAGNOSIS — R73.01 IMPAIRED FASTING GLUCOSE: ICD-10-CM

## 2021-03-02 DIAGNOSIS — I49.3 PVC (PREMATURE VENTRICULAR CONTRACTION): ICD-10-CM

## 2021-03-02 DIAGNOSIS — Z72.0 TOBACCO ABUSE: ICD-10-CM

## 2021-03-02 DIAGNOSIS — E78.5 HYPERLIPIDEMIA LDL GOAL <130: ICD-10-CM

## 2021-03-02 DIAGNOSIS — Z85.46 HISTORY OF MALIGNANT NEOPLASM OF PROSTATE: ICD-10-CM

## 2021-03-02 PROBLEM — I42.9 MYOCARDIOPATHY (HCC): Status: ACTIVE | Noted: 2021-03-02

## 2021-03-02 PROBLEM — I42.9 MYOCARDIOPATHY (HCC): Status: RESOLVED | Noted: 2021-03-02 | Resolved: 2021-03-02

## 2021-03-02 PROCEDURE — G0402 INITIAL PREVENTIVE EXAM: HCPCS | Performed by: NURSE PRACTITIONER

## 2021-03-02 PROCEDURE — 4040F PNEUMOC VAC/ADMIN/RCVD: CPT | Performed by: NURSE PRACTITIONER

## 2021-03-02 PROCEDURE — G8484 FLU IMMUNIZE NO ADMIN: HCPCS | Performed by: NURSE PRACTITIONER

## 2021-03-02 PROCEDURE — G8420 CALC BMI NORM PARAMETERS: HCPCS | Performed by: NURSE PRACTITIONER

## 2021-03-02 PROCEDURE — G8427 DOCREV CUR MEDS BY ELIG CLIN: HCPCS | Performed by: NURSE PRACTITIONER

## 2021-03-02 PROCEDURE — 99214 OFFICE O/P EST MOD 30 MIN: CPT | Performed by: NURSE PRACTITIONER

## 2021-03-02 PROCEDURE — 4004F PT TOBACCO SCREEN RCVD TLK: CPT | Performed by: NURSE PRACTITIONER

## 2021-03-02 PROCEDURE — 3017F COLORECTAL CA SCREEN DOC REV: CPT | Performed by: NURSE PRACTITIONER

## 2021-03-02 PROCEDURE — G0009 ADMIN PNEUMOCOCCAL VACCINE: HCPCS | Performed by: NURSE PRACTITIONER

## 2021-03-02 PROCEDURE — 90732 PPSV23 VACC 2 YRS+ SUBQ/IM: CPT | Performed by: NURSE PRACTITIONER

## 2021-03-02 PROCEDURE — 1123F ACP DISCUSS/DSCN MKR DOCD: CPT | Performed by: NURSE PRACTITIONER

## 2021-03-02 RX ORDER — HYDROCHLOROTHIAZIDE 25 MG/1
TABLET ORAL
Qty: 90 TABLET | Refills: 3 | Status: SHIPPED | OUTPATIENT
Start: 2021-03-02 | End: 2021-05-20

## 2021-03-02 RX ORDER — PRAVASTATIN SODIUM 40 MG
TABLET ORAL
Qty: 90 TABLET | Refills: 3 | Status: SHIPPED | OUTPATIENT
Start: 2021-03-02 | End: 2021-05-20

## 2021-03-02 ASSESSMENT — ENCOUNTER SYMPTOMS
CONSTIPATION: 0
COUGH: 0
BLOOD IN STOOL: 0
SHORTNESS OF BREATH: 0
CHEST TIGHTNESS: 0
DIARRHEA: 0

## 2021-03-02 ASSESSMENT — PATIENT HEALTH QUESTIONNAIRE - PHQ9
SUM OF ALL RESPONSES TO PHQ QUESTIONS 1-9: 0
SUM OF ALL RESPONSES TO PHQ QUESTIONS 1-9: 0

## 2021-03-02 NOTE — PATIENT INSTRUCTIONS
Stopping Smoking: Care Instructions  Your Care Instructions     Cigarette smokers crave the nicotine in cigarettes. Giving it up is much harder than simply changing a habit. Your body has to stop craving the nicotine. It is hard to quit, but you can do it. There are many tools that people use to quit smoking. You may find that combining tools works best for you. There are several steps to quitting. First you get ready to quit. Then you get support to help you. After that, you learn new skills and behaviors to become a nonsmoker. For many people, a necessary step is getting and using medicine. Your doctor will help you set up the plan that best meets your needs. You may want to attend a smoking cessation program to help you quit smoking. When you choose a program, look for one that has proven success. Ask your doctor for ideas. You will greatly increase your chances of success if you take medicine as well as get counseling or join a cessation program.  Some of the changes you feel when you first quit tobacco are uncomfortable. Your body will miss the nicotine at first, and you may feel short-tempered and grumpy. You may have trouble sleeping or concentrating. Medicine can help you deal with these symptoms. You may struggle with changing your smoking habits and rituals. The last step is the tricky one: Be prepared for the smoking urge to continue for a time. This is a lot to deal with, but keep at it. You will feel better. Follow-up care is a key part of your treatment and safety. Be sure to make and go to all appointments, and call your doctor if you are having problems. It's also a good idea to know your test results and keep a list of the medicines you take. How can you care for yourself at home? · Ask your family, friends, and coworkers for support. You have a better chance of quitting if you have help and support.   · Join a support group, such as Nicotine Anonymous, for people who are trying to quit smoking. · Consider signing up for a smoking cessation program, such as the American Lung Association's Freedom from Smoking program.  · Get text messaging support. Go to the website at www.smokefree. gov to sign up for the [facility] program.  · Set a quit date. Pick your date carefully so that it is not right in the middle of a big deadline or stressful time. Once you quit, do not even take a puff. Get rid of all ashtrays and lighters after your last cigarette. Clean your house and your clothes so that they do not smell of smoke. · Learn how to be a nonsmoker. Think about ways you can avoid those things that make you reach for a cigarette. ? Avoid situations that put you at greatest risk for smoking. For some people, it is hard to have a drink with friends without smoking. For others, they might skip a coffee break with coworkers who smoke. ? Change your daily routine. Take a different route to work or eat a meal in a different place. · Cut down on stress. Calm yourself or release tension by doing an activity you enjoy, such as reading a book, taking a hot bath, or gardening. · Talk to your doctor or pharmacist about nicotine replacement therapy, which replaces the nicotine in your body. You still get nicotine but you do not use tobacco. Nicotine replacement products help you slowly reduce the amount of nicotine you need. These products come in several forms, many of them available over-the-counter:  ? Nicotine patches  ? Nicotine gum and lozenges  ? Nicotine inhaler  · Ask your doctor about bupropion (Wellbutrin) or varenicline (Chantix), which are prescription medicines. They do not contain nicotine. They help you by reducing withdrawal symptoms, such as stress and anxiety. · Some people find hypnosis, acupuncture, and massage helpful for ending the smoking habit. · Eat a healthy diet and get regular exercise. Having healthy habits will help your body move past its craving for nicotine.   · Be prepared to keep trying. Most people are not successful the first few times they try to quit. Do not get mad at yourself if you smoke again. Make a list of things you learned and think about when you want to try again, such as next week, next month, or next year. Where can you learn more? Go to https://chpetheodoreewlynne.healthPaperG. org and sign in to your [employer] account. Enter Q657 in the [employer] box to learn more about \"Stopping Smoking: Care Instructions. \"     If you do not have an account, please click on the \"Sign Up Now\" link. Current as of: March 12, 2020               Content Version: 12.6  © 2006-2020 [employer]. Care instructions adapted under license by [facility] ([facility]). If you have questions about a medical condition or this instruction, always ask your healthcare professional. [place] [address] any warranty or liability for your use of this information. Learning About Benefits From Quitting Smoking  How does quitting smoking make you healthier? If you're thinking about quitting smoking, you may have a few reasons to be smoke-free. Your health may be one of them. · When you quit smoking, you lower your risks for cancer, lung disease, heart attack, stroke, blood vessel disease, and blindness from macular degeneration. · When you're smoke-free, you get sick less often, and you heal faster. You are less likely to get colds, flu, bronchitis, and pneumonia. · As a nonsmoker, you may find that your mood is better and you are less stressed. When and how will you feel healthier? Quitting has real health benefits that start from day 1 of being smoke-free. And the longer you stay smoke-free, the healthier you get and the better you feel. The first hours  · After just 20 minutes, your blood pressure and heart rate go down. That means there's less stress on your heart and blood vessels.   · Within 12 hours, the level of carbon monoxide in your blood drops back to normal. That makes room for more oxygen. With more oxygen in your body, you may notice that you have more energy than when you smoked. After 2 weeks  · Your lungs start to work better. · Your risk of heart attack starts to drop. After 1 month  · When your lungs are clear, you cough less and breathe deeper, so it's easier to be active. · Your sense of taste and smell return. That means you can enjoy food more than you have since you started smoking. Over the years  · Over the years, your risks of heart disease, heart attack, and stroke are lower. · After 10 years, your risk of dying from lung cancer is cut by about half. And your risk for many other types of cancer is lower too. How would quitting help others in your life? When you quit smoking, you improve the health of everyone who now breathes in your smoke. · Their heart, lung, and cancer risks drop, much like yours. · They are sick less. For babies and small children, living smoke-free means they're less likely to have ear infections, pneumonia, and bronchitis. · If you're a woman who is or will be pregnant someday, quitting smoking means a healthier . · Children who are close to you are less likely to become adult smokers. Where can you learn more? Go to https://"[employer]".eMagin. org and sign in to your [employer] account. Enter 702 [id number] in the [facility] box to learn more about \"Learning About Benefits From Quitting Smoking. \"     If you do not have an account, please click on the \"Sign Up Now\" link. Current as of: 2020               Content Version: 12.6  © 8168-0488 [employer]. Care instructions adapted under license by [facility] ([facility]). If you have questions about a medical condition or this instruction, always ask your healthcare professional. [place] [address] any warranty or liability for your use of this information.       Personalized Preventive Plan for Maxx [facility] Oneil Rg - 3/2/2021  Medicare offers a range of preventive health benefits. Some of the tests and screenings are paid in full while other may be subject to a deductible, co-insurance, and/or copay. Some of these benefits include a comprehensive review of your medical history including lifestyle, illnesses that may run in your family, and various assessments and screenings as appropriate. After reviewing your medical record and screening and assessments performed today your provider may have ordered immunizations, labs, imaging, and/or referrals for you. A list of these orders (if applicable) as well as your Preventive Care list are included within your After Visit Summary for your review. Other Preventive Recommendations:    · A preventive eye exam performed by an eye specialist is recommended every 1-2 years to screen for glaucoma; cataracts, macular degeneration, and other eye disorders. · A preventive dental visit is recommended every 6 months. · Try to get at least 150 minutes of exercise per week or 10,000 steps per day on a pedometer . · Order or download the FREE \"Exercise & Physical Activity: Your Everyday Guide\" from [employer]. Call 4-256.171.6061 or search [employer] online. · You need 8513-6509 mg of calcium and 0589-0460 IU of vitamin D per day. It is possible to meet your calcium requirement with diet alone, but a vitamin D supplement is usually necessary to meet this goal.  · When exposed to the sun, use a sunscreen that protects against both UVA and UVB radiation with an SPF of 30 or greater. Reapply every 2 to 3 hours or after sweating, drying off with a towel, or swimming. · Always wear a seat belt when traveling in a car. Always wear a helmet when riding a bicycle or motorcycle.

## 2021-03-02 NOTE — PROGRESS NOTES
No Known Allergies  Past Medical History:   Diagnosis Date    Abnormal echocardiogram 04/2019    Hyperlipidemia     Hypertension     Myocardiopathy ([facility] [address] 3/2/2021    Prostate cancer ([facility] [address] 2012    total prostatectomy,DrBey    Sleep apnea     Smoker      Past Surgical History:   Procedure Laterality Date    CARDIAC CATHETERIZATION  04/23/2019    Non Obs CAD, medical management    COLONOSCOPY  3/22/12    EYE SURGERY  1989    eye trauma os with fixed pupil;vision is ok;    PROSTATE BIOPSY      PROSTATECTOMY  02/14/2012      Social History     Tobacco Use    Smoking status: Current Every Day Smoker     Packs/day: 0.50     Years: 30.00     Pack years: 15.00     Types: Cigarettes    Smokeless tobacco: Current User   Substance Use Topics    Alcohol use: Yes     Alcohol/week: 0.0 standard drinks     Comment: rare      Family History   Problem Relation Age of Onset    Stroke Father     Heart Disease Father     Diabetes Father         Vitals:    03/02/21 0909 03/02/21 0913   BP: 132/82    Pulse: (!) 45 (!) 47   Temp: 97.8 °F (36.6 °C)    TempSrc: Infrared    SpO2: 98%    Weight: 143 lb (64.9 kg)    Height: 5' 5\" (1.651 m)      Estimated body mass index is 23.8 kg/m² as calculated from the following:    Height as of this encounter: 5' 5\" (1.651 m). Weight as of this encounter: 143 lb (64.9 kg). Physical Exam  Vitals signs reviewed. Constitutional:       Appearance: Normal appearance. HENT:      Head: Normocephalic. Neck:      Vascular: No carotid bruit. Cardiovascular:      Rate and Rhythm: Normal rate and regular rhythm. Pulses: Normal pulses. Heart sounds: Normal heart sounds. No murmur. No gallop. Pulmonary:      Effort: Pulmonary effort is normal.      Breath sounds: Normal breath sounds. Abdominal:      General: Abdomen is flat. Palpations: Abdomen is soft. Musculoskeletal: Normal range of motion. Skin:     General: Skin is warm and dry.       Capillary Refill: Capillary refill takes less than 2 seconds. Neurological:      General: No focal deficit present. Mental Status: He is alert and oriented to person, place, and time. Psychiatric:         Mood and Affect: Mood normal.         Behavior: Behavior normal.       Nurse Only on 02/23/2021   Component Date Value Ref Range Status    HIV Ag/Ab 02/23/2021 Non-Reactive  Non-reactive Final    HIV-1 Antibody 02/23/2021 Non-Reactive  Non-reactive Final    HIV ANTIGEN 02/23/2021 Non-Reactive  Non-reactive Final    HIV-2 Ab 02/23/2021 Non-Reactive  Non-reactive Final    PSA 02/23/2021 <0.01  0.00 - 4.00 ng/mL Final    Cholesterol, Total 02/23/2021 121  0 - 199 mg/dL Final    Triglycerides 02/23/2021 123  0 - 150 mg/dL Final    HDL 02/23/2021 36* 40 - 60 mg/dL Final    LDL Calculated 02/23/2021 60  <100 mg/dL Final    VLDL Cholesterol Calculated 02/23/2021 25  Not Established mg/dL Final    Sodium 02/23/2021 139  136 - 145 mmol/L Final    Potassium 02/23/2021 3.9  3.5 - 5.1 mmol/L Final    Chloride 02/23/2021 102  99 - 110 mmol/L Final    CO2 02/23/2021 28  21 - 32 mmol/L Final    Anion Gap 02/23/2021 9  3 - 16 Final    Glucose 02/23/2021 109* 70 - 99 mg/dL Final    BUN 02/23/2021 22* 7 - 20 mg/dL Final    CREATININE 02/23/2021 0.7* 0.8 - 1.3 mg/dL Final    GFR Non- 02/23/2021 >60  >60 Final    Comment: >60 mL/min/1.73m2 EGFR, calc. for ages 25 and older using the  MDRD formula (not corrected for weight), is valid for stable  renal function.  GFR  02/23/2021 >60  >60 Final    Comment: Chronic Kidney Disease: less than 60 ml/min/1.73 sq.m. Kidney Failure: less than 15 ml/min/1.73 sq.m. Results valid for patients 18 years and older.       Calcium 02/23/2021 10.0  8.3 - 10.6 mg/dL Final    Total Protein 02/23/2021 6.9  6.4 - 8.2 g/dL Final    Albumin 02/23/2021 4.5  3.4 - 5.0 g/dL Final    Albumin/Globulin Ratio 02/23/2021 1.9  1.1 - 2.2 Final    Total Bilirubin 02/23/2021 <0.2  0.0 - 1.0 mg/dL Final    Alkaline Phosphatase 02/23/2021 69  40 - 129 U/L Final    ALT 02/23/2021 25  10 - 40 U/L Final    AST 02/23/2021 19  15 - 37 U/L Final    Globulin 02/23/2021 2.4  g/dL Final    WBC 02/23/2021 6.0  4.0 - 11.0 K/uL Final    RBC 02/23/2021 4.77  4.20 - 5.90 M/uL Final    Hemoglobin 02/23/2021 15.4  13.5 - 17.5 g/dL Final    Hematocrit 02/23/2021 45.3  40.5 - 52.5 % Final    MCV 02/23/2021 94.8  80.0 - 100.0 fL Final    MCH 02/23/2021 32.3  26.0 - 34.0 pg Final    MCHC 02/23/2021 34.1  31.0 - 36.0 g/dL Final    RDW 02/23/2021 13.6  12.4 - 15.4 % Final    Platelets 10/20/2966 215  135 - 450 K/uL Final    MPV 02/23/2021 8.6  5.0 - 10.5 fL Final    Neutrophils % 02/23/2021 53.4  % Final    Lymphocytes % 02/23/2021 31.1  % Final    Monocytes % 02/23/2021 10.0  % Final    Eosinophils % 02/23/2021 5.0  % Final    Basophils % 02/23/2021 0.5  % Final    Neutrophils Absolute 02/23/2021 3.2  1.7 - 7.7 K/uL Final    Lymphocytes Absolute 02/23/2021 1.9  1.0 - 5.1 K/uL Final    Monocytes Absolute 02/23/2021 0.6  0.0 - 1.3 K/uL Final    Eosinophils Absolute 02/23/2021 0.3  0.0 - 0.6 K/uL Final    Basophils Absolute 02/23/2021 0.0  0.0 - 0.2 K/uL Final         ASSESSMENT/PLAN:  1. Essential hypertension  Stable  Continue Nadolol, HCTZ, Isosorbide  Encouraged heart healthy diet  Encouraged 30 min activity daily  Encouraged weight loss    2. PVC (premature ventricular contraction)  Stable today  Continue care with Cardio Nelly Patton)  See #1    3. History of malignant neoplasm of prostate  Stable today  Continue annual PSA    4. ARGELIA (obstructive sleep apnea)  Stable today  Continue care with Pulm    5. Hyperlipidemia LDL goal <130  Stable today  Continue Pravastatin    6. Impaired fasting glucose  Stable  Encouraged diet changes    7. Tobacco abuse  Encouraged cessation    8.  Routine general medical examination at a health care facility  Medicare Wellness done today, please see documentation below    9. Need for Streptococcus pneumoniae vaccination  Pneumovax today  - PNEUMOVAX 23 subcutaneous/IM (Pneumococcal polysaccharide vaccine 23-valent >= 1yo)      Return in 6 months (on 2021) for HTN/ HLD. Patient's questions answered and concerns addressed. Patient agrees to plan of care. Electronically signed by OLIVIER Choi CNP on 3/2/2021 at 5:25 PM     Medicare Annual Wellness Visit  Name: Ernesto Adler Date: 3/2/2021   MRN: <[id number]> Sex: Male   Age: 72 y.o. Ethnicity: Non-/Non    : 1955 Race: Chris Farrar is here for Medicare AWV, Hyperlipidemia, and Hypertension    Screenings for behavioral, psychosocial and functional/safety risks, and cognitive dysfunction are all negative except as indicated below. These results, as well as other patient data from the [address] form, are documented in Flowsheets linked to this Encounter. No Known Allergies      Prior to Visit Medications    Medication Sig Taking? Authorizing Provider   nadolol (CORGARD) 20 MG tablet Take 1 tablet by mouth 2 times daily Yes OLIVIER Choi CNP   isosorbide mononitrate (IMDUR) 30 MG extended release tablet TAKE ONE TABLET BY MOUTH DAILY Yes OLIVIER Choi CNP   aspirin 325 MG tablet Take 325 mg by mouth daily Yes Historical Provider, MD   niacin 500 MG tablet Take 500 mg by mouth daily.  Yes Historical Provider, MD   hydroCHLOROthiazide (HYDRODIURIL) 25 MG tablet TAKE 1 TABLET DAILY  OLIVIER Choi CNP   pravastatin (PRAVACHOL) 40 MG tablet TAKE 1 TABLET DAILY  OLIVIER Choi CNP         Past Medical History:   Diagnosis Date    Abnormal echocardiogram 2019    Hyperlipidemia     Hypertension     [facility]ocardiopathy ([facility] [address] 3/2/2021    Prostate cancer ([facility] [address] 2012    total prostatectomy,DrBey    Sleep apnea     Smoker        Past Surgical History:   Procedure Laterality Date    CARDIAC CATHETERIZATION  04/23/2019    Non Obs CAD, medical management    COLONOSCOPY  3/22/12    EYE SURGERY  1989    eye trauma os with fixed pupil;vision is ok;    PROSTATE BIOPSY      PROSTATECTOMY  02/14/2012         Family History   Problem Relation Age of Onset    Stroke Father     Heart Disease Father     Diabetes Father        CareTeam (Including outside providers/suppliers regularly involved in providing care):   Patient Care Team:  OLIVIER Toure CNP as PCP - General (Nurse Practitioner)  OLIVIER Toure CNP as PCP - [facility] Empaneled Provider  Jamila Wu MD as Consulting Physician (Radiation Oncology)  Julien Carvajal MD as Consulting Physician (Pulmonology)    Wt Readings from Last 3 Encounters:   03/02/21 143 lb (64.9 kg)   12/10/20 144 lb (65.3 kg)   09/03/20 142 lb (64.4 kg)     Vitals:    03/02/21 0909 03/02/21 0913   BP: 132/82    Pulse: (!) 45 (!) 47   Temp: 97.8 °F (36.6 °C)    TempSrc: Infrared    SpO2: 98%    Weight: 143 lb (64.9 kg)    Height: 5' 5\" (1.651 m)      Body mass index is 23.8 kg/m². Based upon direct observation of the patient, evaluation of cognition reveals recent and remote memory intact. Patient's complete Health Risk Assessment and screening values have been reviewed and are found in Flowsheets. The following problems were reviewed today and where indicated follow up appointments were made and/or referrals ordered.     Positive Risk Factor Screenings with Interventions:         Substance History:  Social History     Tobacco History     Smoking Status  Current Every Day Smoker Smoking Frequency  0.5 packs/day for 30 years (15 pk yrs) Smoking Tobacco Type  Cigarettes    Smokeless Tobacco Use  Current User          Alcohol History     Alcohol Use Status  Yes Drinks/Week  0 Standard drinks or equivalent per week Amount  0.0 standard drinks of alcohol/wk Comment  rare          Drug Use     Drug Use Status  Not Asked          Sexual Activity     Sexually Active  Not Asked               Alcohol Screening:       A score of 8 or more is associated with harmful or hazardous drinking. A score of 13 or more in women, and 15 or more in men, is likely to indicate alcohol dependence. Substance Abuse Interventions:  · Tobacco abuse:  patient agrees to think about his/her triggers for tobacco use, why he/she should quit, and learn more about the harmful effects of tobacco    General Health and ACP:  General  In general, how would you say your health is?: Very Good  In the past 7 days, have you experienced any of the following?  New or Increased Pain, New or Increased Fatigue, Loneliness, Social Isolation, Stress or Anger?: None of These  Do you get the social and emotional support that you need?: Yes  Advance Directives     Power of [address] [place] [address] Will ACP-Advance Directive ACP-Power of     Not on File Not on File Not on File Not on File      General Health Risk Interventions:  · No Living Will: Advance Care Planning addressed with patient today    Health Habits/Nutrition:  Health Habits/Nutrition  Do you exercise for at least 20 minutes 2-3 times per week?: Yes  Have you lost any weight without trying in the past 3 months?: No  Do you eat only one meal per day?: No  Have you seen the dentist within the past year?: (!) No  Body mass index: 23.79  Health Habits/Nutrition Interventions:  · Dental exam overdue:  patient declines dental evaluation, upper teeth have been pulled    Hearing/Vision:  No exam data present  Hearing/Vision  Do you or your family notice any trouble with your hearing that hasn't been managed with hearing aids?: No  Do you have difficulty driving, watching TV, or doing any of your daily activities because of your eyesight?: (!) Yes  Have you had an eye exam within the past year?: Yes  Hearing/Vision Interventions:  · Vision concerns:  patient encouraged to make appointment with his/her eye specialist      Personalized Preventive Plan   Current Health Maintenance Status  Immunization History   Administered Date(s) Administered    Influenza Vaccine, unspecified formulation 11/17/2017, 09/19/2019    Influenza Virus Vaccine 11/13/2015    Influenza, Kimberli Kalata, IM, (6 mo and older Fluzone, Flulaval, Fluarix and 3 yrs and older Afluria) 11/23/2016, 11/17/2017, 09/19/2019    Influenza, Kimberli Kalata, Recombinant, IM PF (Flublok 18 yrs and older) 09/28/2018    Influenza, Quadv, adjuvanted, 65 yrs +, IM, PF (Fluad) 09/21/2020    Pneumococcal Polysaccharide (Aeasbsami78) 11/13/2015, 03/02/2021    Tdap (Boostrix, Adacel) 06/13/2014        Health Maintenance   Topic Date Due    COVID-19 Vaccine (1 of 2) Never done    Shingles Vaccine (1 of 2) Never done   [employer] Visit (AWV)  Never done    A1C test (Diabetic or Prediabetic)  09/03/2021    Lipid screen  02/23/2022    Potassium monitoring  02/23/2022    Creatinine monitoring  02/23/2022    PSA counseling  02/23/2022    Colon cancer screen colonoscopy  03/22/2022    DTaP/Tdap/Td vaccine (2 - Td) 06/13/2024    Flu vaccine  Completed    Pneumococcal 65+ years Vaccine  Completed    AAA screen  Completed    Hepatitis C screen  Completed    HIV screen  Completed    Hepatitis A vaccine  Aged Out    Hepatitis B vaccine  Aged Out    Hib vaccine  Aged Out    Meningococcal (ACWY) vaccine  Aged Out     Recommendations for Five minutes Due: see orders and patient instructions/AVS.  . Recommended screening schedule for the next 5-10 years is provided to the patient in written form: see Patient Instructions/AVS.    [address] (ACP) Physician/NP/PA Conversation    Date of Conversation: 3/2/2021  Conducted with: Patient with Decision Making Capacity    Healthcare Decision Maker:        Click here to complete [place] including selection of the Healthcare Decision Maker Relationship (ie \"Primary\")  Today we discussed [place].  The patient is considering options. Care Preferences:    Hospitalization: \"If your health worsens and it becomes clear that your chance of recovery is unlikely, what would be your preference regarding hospitalization? \"  The patient would prefer hospitalization. Ventilation: \"If you were unable to breath on your own and your chance of recovery was unlikely, what would be your preference about the use of a ventilator (breathing machine) if it was available to you? \"  The patient would desire the use of a ventilator. Resuscitation: \"In the event your heart stopped as a result of an underlying serious health condition, would you want attempts made to restart your heart, or would you prefer a natural death? \"  Yes, attempt to resuscitate.     treatment goals, benefit/burden of treatment options, artificial nutrition, ventilation preferences, hospitalization preferences, resuscitation preferences, end of life care preferences (vegetative state/imminent death) and hospice care    Conversation Outcomes / Follow-Up Plan:  ACP in process - information provided, considering goals and options  Reviewed DNR/DNI and patient elects Full Code (Attempt Resuscitation)    Length of Voluntary ACP Conversation in minutes:  <16 minutes (Non-Billable)    Debra Garcia

## 2021-05-16 DIAGNOSIS — I10 ESSENTIAL HYPERTENSION: ICD-10-CM

## 2021-05-17 NOTE — TELEPHONE ENCOUNTER
----- Message from Verdie Olszewski sent at 5/17/2021 10:54 AM EDT -----  Subject: Refill Request    QUESTIONS  Name of Medication? nadolol (CORGARD) 20 MG tablet  Patient-reported dosage and instructions? twice daily  How many days do you have left? 0  Preferred Pharmacy? [facility]  Pharmacy phone number (if available)? [phone]  ---------------------------------------------------------------------------  --------------,  Name of Medication? isosorbide mononitrate (IMDUR) 30 MG extended release   tablet  Patient-reported dosage and instructions? once dfaily  How many days do you have left? 0  Preferred Pharmacy? [facility]  Pharmacy phone number (if available)? [phone]  ---------------------------------------------------------------------------  --------------  CALL BACK INFO  What is the best way for the office to contact you? OK to leave message on   voicemail  Preferred Call Back Phone Number?  [phone]

## 2021-05-18 RX ORDER — ISOSORBIDE MONONITRATE 30 MG/1
TABLET, EXTENDED RELEASE ORAL
Qty: 90 TABLET | Refills: 1 | Status: SHIPPED | OUTPATIENT
Start: 2021-05-18 | End: 2021-08-24 | Stop reason: SDUPTHER

## 2021-05-18 RX ORDER — NADOLOL 20 MG/1
TABLET ORAL
Qty: 180 TABLET | Refills: 0 | Status: SHIPPED | OUTPATIENT
Start: 2021-05-18 | End: 2021-08-24 | Stop reason: SDUPTHER

## 2021-05-20 DIAGNOSIS — I10 ESSENTIAL HYPERTENSION: ICD-10-CM

## 2021-05-20 DIAGNOSIS — E78.5 HYPERLIPIDEMIA LDL GOAL <130: ICD-10-CM

## 2021-05-20 RX ORDER — HYDROCHLOROTHIAZIDE 25 MG/1
TABLET ORAL
Qty: 90 TABLET | Refills: 1 | Status: SHIPPED | OUTPATIENT
Start: 2021-05-20 | End: 2021-08-24 | Stop reason: SDUPTHER

## 2021-05-20 RX ORDER — PRAVASTATIN SODIUM 40 MG
TABLET ORAL
Qty: 90 TABLET | Refills: 1 | Status: SHIPPED | OUTPATIENT
Start: 2021-05-20 | End: 2021-08-24 | Stop reason: SDUPTHER

## 2021-05-20 NOTE — TELEPHONE ENCOUNTER
Spoke to patient and he does not use [facility] so the refills that were sent in March went to the wrong pharmacy so I am resending these to [facility] per Patient request

## 2021-06-03 NOTE — TELEPHONE ENCOUNTER
Called patient and the line was answered and someone just kept saying hello. . will try another time

## 2021-08-22 NOTE — PROGRESS NOTES
Oneyda Payton (:  1955) is a 72 y.o. male,Established patient, here for evaluation of the following chief complaint(s): Hyperlipidemia, Hypertension (need nadolol refilled ), and Insomnia         ASSESSMENT/PLAN:  1. Essential hypertension  Stable  Continue Nadolol, HCTZ, Isosorbide  Encouraged heart healthy diet  Encouraged 30 min activity daily  Encouraged weight loss  -     hydroCHLOROthiazide (HYDRODIURIL) 25 MG tablet; TAKE ONE TABLET BY MOUTH DAILY, Disp-90 tablet, R-0Normal  -     nadolol (CORGARD) 20 MG tablet; TAKE ONE TABLET BY MOUTH TWICE A DAY, Disp-180 tablet, R-1Normal  2. Hyperlipidemia LDL goal <130  Stable today  Continue Pravastatin  -     pravastatin (PRAVACHOL) 40 MG tablet; TAKE ONE TABLET BY MOUTH DAILY, Disp-90 tablet, R-0Normal      Return in about 6 months (around 2022) for HTN/ HLD OV. SUBJECTIVE/OBJECTIVE:  Presenting for phone conversation due to April for chronic care follow up. Concerned about staying asleep. Wakes around 4-5 AM.  Started with recent time charge. No problem staying asleep. Using cpap most nights. CAD/ Bradycardia with PVCs/ HTN: Continuing to follow with Cardiology.  Asymptomatic today, no CP, SOB, leg swelling.  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 Does not need refills.  Continuing to smoke 1/2ppd, not interested in quitting. HLD: Tolerating Atorvastatin well.  No myalgias, leg pain, SOB.   ARGELIA: Tolerating CPAP well, using every night. Prostate Cancer: No longer following with Urology, obtaining PSA yearly through this office     Labs recently done. Planning to obtain COVID vaccination    Review of Systems   Constitutional: Negative. Respiratory: Negative for cough, chest tightness and shortness of breath. Cardiovascular: Negative. Gastrointestinal: Negative for blood in stool, constipation and diarrhea. Skin: Negative. Neurological: Negative for dizziness, tremors, light-headedness and headaches. Psychiatric/Behavioral: Negative for decreased concentration, dysphoric mood, self-injury, sleep disturbance and suicidal ideas. The patient is not nervous/anxious. Patient-Reported Vitals 1/26/2021   Patient-Reported Weight 142lbs   Patient-Reported Height 5 5      Physical Exam: No exam due to COVID      On this date 8/24/2021 I have spent 2 minutes reviewing previous notes, test results and face to face (virtual) with the patient discussing the diagnosis and importance of compliance with the treatment plan as well as documenting on the day of the visit. Domingo Radha, was evaluated through a synchronous (real-time) audio-video encounter. The patient (or guardian if applicable) is aware that this is a billable service. Verbal consent to proceed has been obtained within the past 12 months. The visit was conducted pursuant to the emergency declaration under the [facility], [address] [place] [address] authority and the [employer] and [employer] Act. Patient identification was verified, and a caregiver was present when appropriate. The patient was located in a state where the provider was credentialed to provide care. An electronic signature was used to authenticate this note.     --OLIVIER Wilcox - CNP

## 2021-08-23 ENCOUNTER — TELEPHONE (OUTPATIENT)
Dept: FAMILY MEDICINE CLINIC | Age: 66
End: 2021-08-23

## 2021-08-23 DIAGNOSIS — I10 ESSENTIAL HYPERTENSION: ICD-10-CM

## 2021-08-23 RX ORDER — ISOSORBIDE MONONITRATE 30 MG/1
TABLET, EXTENDED RELEASE ORAL
Qty: 90 TABLET | Refills: 1 | Status: CANCELLED | OUTPATIENT
Start: 2021-08-23

## 2021-08-23 RX ORDER — NADOLOL 20 MG/1
TABLET ORAL
Qty: 180 TABLET | Refills: 0 | Status: CANCELLED | OUTPATIENT
Start: 2021-08-23

## 2021-08-23 NOTE — TELEPHONE ENCOUNTER
Last office visit 3/2/2021     Last written     Next office visit scheduled 8/24/2021    Requested Prescriptions     Pending Prescriptions Disp Refills    nadolol (CORGARD) 20 MG tablet 180 tablet 0    isosorbide mononitrate (IMDUR) 30 MG extended release tablet 90 tablet 1     Sig: TAKE ONE TABLET BY MOUTH DAILY

## 2021-08-24 ENCOUNTER — VIRTUAL VISIT (OUTPATIENT)
Dept: FAMILY MEDICINE CLINIC | Age: 66
End: 2021-08-24
Payer: MEDICARE

## 2021-08-24 DIAGNOSIS — I49.3 PVC (PREMATURE VENTRICULAR CONTRACTION): Primary | ICD-10-CM

## 2021-08-24 DIAGNOSIS — E78.5 HYPERLIPIDEMIA LDL GOAL <130: ICD-10-CM

## 2021-08-24 DIAGNOSIS — I10 ESSENTIAL HYPERTENSION: ICD-10-CM

## 2021-08-24 PROCEDURE — 4004F PT TOBACCO SCREEN RCVD TLK: CPT | Performed by: NURSE PRACTITIONER

## 2021-08-24 PROCEDURE — 99214 OFFICE O/P EST MOD 30 MIN: CPT | Performed by: NURSE PRACTITIONER

## 2021-08-24 PROCEDURE — 4040F PNEUMOC VAC/ADMIN/RCVD: CPT | Performed by: NURSE PRACTITIONER

## 2021-08-24 PROCEDURE — G8427 DOCREV CUR MEDS BY ELIG CLIN: HCPCS | Performed by: NURSE PRACTITIONER

## 2021-08-24 PROCEDURE — 1123F ACP DISCUSS/DSCN MKR DOCD: CPT | Performed by: NURSE PRACTITIONER

## 2021-08-24 PROCEDURE — 3017F COLORECTAL CA SCREEN DOC REV: CPT | Performed by: NURSE PRACTITIONER

## 2021-08-24 PROCEDURE — G8420 CALC BMI NORM PARAMETERS: HCPCS | Performed by: NURSE PRACTITIONER

## 2021-08-24 RX ORDER — HYDROCHLOROTHIAZIDE 25 MG/1
TABLET ORAL
Qty: 90 TABLET | Refills: 1 | Status: SHIPPED | OUTPATIENT
Start: 2021-08-24 | End: 2022-02-17 | Stop reason: SDUPTHER

## 2021-08-24 RX ORDER — NADOLOL 20 MG/1
TABLET ORAL
Qty: 180 TABLET | Refills: 1 | Status: SHIPPED | OUTPATIENT
Start: 2021-08-24 | End: 2022-01-06

## 2021-08-24 RX ORDER — PRAVASTATIN SODIUM 40 MG
TABLET ORAL
Qty: 90 TABLET | Refills: 1 | Status: SHIPPED | OUTPATIENT
Start: 2021-08-24 | End: 2022-02-17 | Stop reason: SDUPTHER

## 2021-08-24 RX ORDER — ISOSORBIDE MONONITRATE 30 MG/1
TABLET, EXTENDED RELEASE ORAL
Qty: 90 TABLET | Refills: 1 | Status: SHIPPED | OUTPATIENT
Start: 2021-08-24 | End: 2022-02-17 | Stop reason: SDUPTHER

## 2021-08-24 ASSESSMENT — ENCOUNTER SYMPTOMS
BLOOD IN STOOL: 0
CHEST TIGHTNESS: 0
DIARRHEA: 0
COUGH: 0
SHORTNESS OF BREATH: 0
CONSTIPATION: 0

## 2021-11-30 ENCOUNTER — TELEPHONE (OUTPATIENT)
Dept: FAMILY MEDICINE CLINIC | Age: 66
End: 2021-11-30

## 2021-11-30 NOTE — TELEPHONE ENCOUNTER
----- Message from Julia Toney sent at 11/30/2021  9:53 AM EST -----  Subject: Refill Request    QUESTIONS  Name of Medication? pravastatin (PRAVACHOL) 40 MG tablet  Patient-reported dosage and instructions? one 40 mg tab a day   How many days do you have left? 3  [facility]? [facility]  Pharmacy phone number (if available)? [phone]  ---------------------------------------------------------------------------  --------------,  Name of Medication? hydroCHLOROthiazide (HYDRODIURIL) 25 MG tablet  Patient-reported dosage and instructions? one 25mg tab a day  How many days do you have left? 4  Preferred Pharmacy? [facility]  Pharmacy phone number (if available)? [phone]  ---------------------------------------------------------------------------  --------------,  Name of Medication? nadolol (CORGARD) 20 MG tablet  Patient-reported dosage and instructions? two times a day 20mg  How many days do you have left? 3  [facility]  Pharmacy phone number (if available)? [phone]  ---------------------------------------------------------------------------  --------------,  Name of Medication? isosorbide mononitrate (IMDUR) 30 MG extended release   tablet  Patient-reported dosage and instructions? one 30mg tab a day  How many days do you have left? 2  [facility] Pharmacy? [facility]  Pharmacy phone number (if available)? [phone]  ---------------------------------------------------------------------------  --------------  CALL BACK INFO  What is the best way for the office to contact you? OK to leave message on   voicemail  Preferred Call Back Phone Number?  [phone]

## 2021-11-30 NOTE — TELEPHONE ENCOUNTER
Called the pharmacy and they still had refills on these meds so they will get them ready  Patient was notified

## 2022-01-06 ENCOUNTER — OFFICE VISIT (OUTPATIENT)
Dept: CARDIOLOGY CLINIC | Age: 67
End: 2022-01-06
Payer: MEDICARE

## 2022-01-06 VITALS
SYSTOLIC BLOOD PRESSURE: 138 MMHG | OXYGEN SATURATION: 95 % | DIASTOLIC BLOOD PRESSURE: 62 MMHG | HEART RATE: 51 BPM | HEIGHT: 65 IN | WEIGHT: 144.5 LBS | BODY MASS INDEX: 24.07 KG/M2

## 2022-01-06 DIAGNOSIS — I49.3 PVC (PREMATURE VENTRICULAR CONTRACTION): ICD-10-CM

## 2022-01-06 DIAGNOSIS — R00.1 SINUS BRADYCARDIA: ICD-10-CM

## 2022-01-06 DIAGNOSIS — I10 ESSENTIAL HYPERTENSION: Primary | ICD-10-CM

## 2022-01-06 PROCEDURE — G8420 CALC BMI NORM PARAMETERS: HCPCS | Performed by: INTERNAL MEDICINE

## 2022-01-06 PROCEDURE — G8484 FLU IMMUNIZE NO ADMIN: HCPCS | Performed by: INTERNAL MEDICINE

## 2022-01-06 PROCEDURE — 1123F ACP DISCUSS/DSCN MKR DOCD: CPT | Performed by: INTERNAL MEDICINE

## 2022-01-06 PROCEDURE — 99214 OFFICE O/P EST MOD 30 MIN: CPT | Performed by: INTERNAL MEDICINE

## 2022-01-06 PROCEDURE — 93000 ELECTROCARDIOGRAM COMPLETE: CPT | Performed by: INTERNAL MEDICINE

## 2022-01-06 PROCEDURE — 3017F COLORECTAL CA SCREEN DOC REV: CPT | Performed by: INTERNAL MEDICINE

## 2022-01-06 PROCEDURE — G8427 DOCREV CUR MEDS BY ELIG CLIN: HCPCS | Performed by: INTERNAL MEDICINE

## 2022-01-06 PROCEDURE — 4004F PT TOBACCO SCREEN RCVD TLK: CPT | Performed by: INTERNAL MEDICINE

## 2022-01-06 PROCEDURE — 4040F PNEUMOC VAC/ADMIN/RCVD: CPT | Performed by: INTERNAL MEDICINE

## 2022-01-06 RX ORDER — NADOLOL 20 MG/1
20 TABLET ORAL DAILY
Qty: 90 TABLET | Refills: 5 | Status: SHIPPED | OUTPATIENT
Start: 2022-01-06 | End: 2022-01-07

## 2022-01-06 NOTE — PATIENT INSTRUCTIONS
RECOMMENDATIONS:  1. Decrease nadolol to once daily. Ok to increase back to twice daily needed. 2. Recommend smoking cessation: [id number]  3. Follow up in one year.

## 2022-01-06 NOTE — PROGRESS NOTES
[facility]   Electrophysiology Note    Date: 1/6/22  Patient Name: Martha Miranda  YOB: 1955     Chief Complaint:  1 Year Follow Up and Bradycardia    Primary Care Physician: OLIVIER Franz CNP     HISTORY OF PRESENT ILLNESS: Martha Miranda is a 77 y.o. male who presents for follow up for multi focal premature ventricular complexes. He has a past medical history of PVC's, NICM, HTN, non-obstructive CAD, and ARGELIA. He was evaluated in 3/2019 for frequent PVC's. Echo 4/9/19 demonstrated EF of 45-50%. Left heart cath on 4/23/19 demonstrated mild non-obstructive CAD. He wore a 24 HR cardiac event monitor from 6/2019 demonstrated PVC burden of 20%. He was diagnosed with ARGELIA in 6/2019 and CPAP was started. A 24 Hour cardiac event monitor from 10/2019 demonstrated PVC burden of 12%. His EKG on 12/10/20 demonstrated SB with HR 51 BPM. On 12/10/20 he reported he is feeling well from a cardiac standpoint. He reports he has not had recurrence of his palpitations or PVC's since his last office visit and being on the nadolol. He reports he has significantly decreased the amount of cigarettes he smokes daily. Interval History since last office visit: Today, 1/6/2022, ECG demonstrates SB (48). He states that he is doing well. He is able to do everything he wants without limitations. He states that he is taking his medications as prescribed. Patient denies current edema, chest pain, sob, palpitations, dizziness or syncope. Past Medical History:   has a past medical history of Abnormal echocardiogram, Hyperlipidemia, Hypertension, Myocardiopathy ([address] Pro[address] cancer ([facility] [address] Sleep apnea, and Smoker. Past Surgical History:   has a past surgical history that includes Prostatectomy (02/14/2012); Colonoscopy (3/22/12); Eye surgery (1989); Prostate biopsy; and Cardiac catheterization (04/23/2019). Social History:   reports that he has been smoking cigarettes.  He has a 15.00 pack-year smoking history. He uses smokeless tobacco. He reports current alcohol use. Family History: family history includes Diabetes in his father; Heart Disease in his father; Stroke in his father. Allergies:  Patient has no known allergies. Home Medications:    Prior to Admission medications    Medication Sig Start Date End Date Taking? Authorizing Provider   hydroCHLOROthiazide (HYDRODIURIL) 25 MG tablet TAKE ONE TABLET BY MOUTH DAILY 8/24/21  Yes OLIVIER Khan CNP   isosorbide mononitrate (IMDUR) 30 MG extended release tablet TAKE ONE TABLET BY MOUTH DAILY 8/24/21  Yes OLIVIER Khan CNP   nadolol (CORGARD) 20 MG tablet TAKE ONE TABLET BY MOUTH TWICE A DAY 8/24/21  Yes OLIVIER Khan CNP   pravastatin (PRAVACHOL) 40 MG tablet TAKE ONE TABLET BY MOUTH DAILY 8/24/21  Yes OLIVIER Khan CNP   aspirin 325 MG tablet Take 325 mg by mouth daily   Yes Historical Provider, MD   niacin 500 MG tablet Take 500 mg by mouth daily. Yes Historical Provider, MD       REVIEW OF SYSTEMS:      All 14-point review of systems are completed and pertinent positives are mentioned in the history of present illness. Other systems are reviewed and are negative. Physical Examination:    /62   Pulse 51   Ht 5' 5\" (1.651 m)   Wt 144 lb 8 oz (65.5 kg)   SpO2 95%   BMI 24.05 kg/m²    Constitutional and General Appearance: alert, cooperative, no distress and appears stated age  HEENT: PERRL, no cervical lymphadenopathy. No masses palpable. Normal oral mucosa  Respiratory:  · Normal excursion and expansion without use of accessory muscles  · Resp Auscultation: Normal breath sounds without dullness or wheezing  Cardiovascular:  · The apical impulse is not displaced  · RRR S1S2 w/o M/G/R  Abdomen:  · No masses or tenderness  · Bowel sounds present  Extremities:  ·  No Cyanosis or Clubbing  ·  Lower extremity edema: No  · Skin: Warm and dry  Neurological:  · Alert and oriented.   · Moves all extremities well  · No abnormalities of mood, affect, memory, mentation, or behavior are noted    DATA:    ECG 1/6/22: Personally reviewed. ECHO: (limited) 8/1/19: Summary   Limited exam for LVEF. Left ventricular systolic function is normal with ejection fraction   estimated at 55%. No regional wall motion abnormalities. Compared to exam done 4/9/2019, left ventricular systolic function has   improved and wall motion is better. ECHO: 4/9/19:  Summary   Left ventricular systolic function is low normal with ejection fraction   estimated at 45-50 %. Mild anterior,Anteroseptal and inferoseptal wall   hypokinesia. Normal left ventricular wall thickness. Left ventricle size is normal.   Normal left ventricular diastolic filling pressure. The mitral valve normal in structure and function. Mild mitral regurgitation. The aortic valve sclerosis with normal opening. No evidence of aortic valve regurgitation. LA normal in size    IMPRESSION:    1. Premature Ventricular Complexes  12/10/2020  Patient is a pleasant 51-year-old male with a medical history significant for multifocal premature ventricular contractions, obstructive sleep apnea, tobacco abuse, and nonischemic cardiomyopathy who presents from home to \[facility]\"" care. Patient is been doing well. His EKG today shows no PVCs. His cardiomyopathy has resolved. EP standpoint patient is in good position. We will asked that he follow-up with us yearly. - Continue nadolol.  - Follow up with EP NP in 1 year unless/until procedure/discussion required or PRN. 01/06/2022  Patient resents for follow-up. Has been doing well. No symptomatic palpitations or PVCs. No PVCs noted EKG today however he is bradycardic with heart rates in the high 40s to 50s. He is currently taking nadolol 20 mg twice daily. We will decrease this to 20 mg daily. If his symptoms recur we will increase him back up to twice daily dosing.   We will have him follow-up with us in 1 year or as needed. - Decrease nadolol t 20 mg daily.  - Follow up with EP NP in 1 year unless/until procedure/discussion required or PRN. 2. Sinus bradycardia on nadolol. Asymptomatic. Heart rate 50s. No fatigue or exercise intolerance. - Continue to follow clinically. - Plan as per above. 1/6/2022  - Plan as per above. RECOMMENDATIONS:  1. Decrease nadolol to once daily. Ok to increase back to twice daily needed. 2. Recommend smoking cessation: [id number]  3. Follow up in one year. QUALITY MEASURES  1. Tobacco Cessation Counseling: Yes  2. Retake of BP if >140/90:   NA  3. Documentation to PCP/referring for new patient:  Sent to PCP at close of office visit  4. CAD patient on anti-platelet: NA  5. CAD patient on STATIN therapy:  NA  6. Patient with CHF and aFib on anticoagulation:  NA     All questions and concerns were addressed to the patient/family. Alternatives to my treatment were discussed. Lo Tsai RN, am scribing for and in the presence of Dr. Danilo Stock. 01/06/22 10:33 AM   Aurelio Elliott RN    The scribe's documentation has been prepared under my direction and personally reviewed by me in its entirety. I confirm that the note above accurately reflects all work, physical examination, the discussion of treatments and procedures, and medical decision making performed by me. Edilia Nolan MD personally performed the services described in this documentation as scribed by nurse in my presence, and is both accurate and complete. Electronically signed by Laurie Nunn MD on 1/6/2022 at 10:41 AM     Dr. Heather Ochoa MD  Electrophysiology  [facility]. [address] [place] [address]. [address]. [place], [address]  Phone: [phone]  Fax: [phone]   1/6/2022     NOTE: This report was transcribed using voice recognition software.  Every effort was made to ensure accuracy, however, inadvertent computerized transcription errors may be present.

## 2022-01-06 NOTE — LETTER
Mary Del Toro  1955    [facility]   Electrophysiology Note    Date: 1/6/22  Patient Name: Mary Del Toro  YOB: 1955     Chief Complaint:  1 Year Follow Up and Bradycardia    Primary Care Physician: OLIVIER Galeano CNP     HISTORY OF PRESENT ILLNESS: Mary Del Toro is a 77 y.o. male who presents for follow up for multi focal premature ventricular complexes. He has a past medical history of PVC's, NICM, HTN, non-obstructive CAD, and ARGELIA. He was evaluated in 3/2019 for frequent PVC's. Echo 4/9/19 demonstrated EF of 45-50%. Left heart cath on 4/23/19 demonstrated mild non-obstructive CAD. He wore a 24 HR cardiac event monitor from 6/2019 demonstrated PVC burden of 20%. He was diagnosed with ARGELIA in 6/2019 and CPAP was started. A 24 Hour cardiac event monitor from 10/2019 demonstrated PVC burden of 12%. His EKG on 12/10/20 demonstrated SB with HR 51 BPM. On 12/10/20 he reported he is feeling well from a cardiac standpoint. He reports he has not had recurrence of his palpitations or PVC's since his last office visit and being on the nadolol. He reports he has significantly decreased the amount of cigarettes he smokes daily. Interval History since last office visit: Today, 1/6/2022, ECG demonstrates SB (48). He states that he is doing well. He is able to do everything he wants without limitations. He states that he is taking his medications as prescribed. Patient denies current edema, chest pain, sob, palpitations, dizziness or syncope. Past Medical History:   has a past medical history of Abnormal echocardiogram, Hyperlipidemia, Hypertension, Myocardiopathy ([facility] [address] Prostate cancer ([facility] [address] Sleep apnea, and Smoker. Past Surgical History:   has a past surgical history that includes Prostatectomy (02/14/2012); Colonoscopy (3/22/12); Eye surgery (1989); Prostate biopsy; and Cardiac catheterization (04/23/2019).      Social History:   reports that he has been smoking cigarettes. He has a 15.00 pack-year smoking history. He uses smokeless tobacco. He reports current alcohol use. Family History: family history includes Diabetes in his father; Heart Disease in his father; Stroke in his father. Allergies:  Patient has no known allergies. Home Medications:    Prior to Admission medications    Medication Sig Start Date End Date Taking? Authorizing Provider   hydroCHLOROthiazide (HYDRODIURIL) 25 MG tablet TAKE ONE TABLET BY MOUTH DAILY 8/24/21  Yes OLIVIER Martinez CNP   isosorbide mononitrate (IMDUR) 30 MG extended release tablet TAKE ONE TABLET BY MOUTH DAILY 8/24/21  Yes OLIVIER Martinez CNP   nadolol (CORGARD) 20 MG tablet TAKE ONE TABLET BY MOUTH TWICE A DAY 8/24/21  Yes OLIVIER Martinez CNP   pravastatin (PRAVACHOL) 40 MG tablet TAKE ONE TABLET BY MOUTH DAILY 8/24/21  Yes OLIVIER Martinez CNP   aspirin 325 MG tablet Take 325 mg by mouth daily   Yes Historical Provider, MD   niacin 500 MG tablet Take 500 mg by mouth daily. Yes Historical Provider, MD       REVIEW OF SYSTEMS:      All 14-point review of systems are completed and pertinent positives are mentioned in the history of present illness. Other systems are reviewed and are negative. Physical Examination:    /62   Pulse 51   Ht 5' 5\" (1.651 m)   Wt 144 lb 8 oz (65.5 kg)   SpO2 95%   BMI 24.05 kg/m²    Constitutional and General Appearance: alert, cooperative, no distress and appears stated age  HEENT: PERRL, no cervical lymphadenopathy. No masses palpable.  Normal oral mucosa  Respiratory:  · Normal excursion and expansion without use of accessory muscles  · Resp Auscultation: Normal breath sounds without dullness or wheezing  Cardiovascular:  · The apical impulse is not displaced  · RRR S1S2 w/o M/G/R  Abdomen:  · No masses or tenderness  · Bowel sounds present  Extremities:  ·  No Cyanosis or Clubbing  ·  Lower extremity edema: No  · Skin: Warm and dry  Neurological:  · Alert and oriented. · Moves all extremities well  · No abnormalities of mood, affect, memory, mentation, or behavior are noted    DATA:    ECG 1/6/22: Personally reviewed. ECHO: (limited) 8/1/19: Summary   Limited exam for LVEF. Left ventricular systolic function is normal with ejection fraction   estimated at 55%. No regional wall motion abnormalities. Compared to exam done 4/9/2019, left ventricular systolic function has   improved and wall motion is better. ECHO: 4/9/19:  Summary   Left ventricular systolic function is low normal with ejection fraction   estimated at 45-50 %. Mild anterior,Anteroseptal and inferoseptal wall   hypokinesia. Normal left ventricular wall thickness. Left ventricle size is normal.   Normal left ventricular diastolic filling pressure. The mitral valve normal in structure and function. Mild mitral regurgitation. The aortic valve sclerosis with normal opening. No evidence of aortic valve regurgitation. LA normal in size    IMPRESSION:    1. Premature Ventricular Complexes  12/10/2020  Patient is a pleasant 30-year-old male with a medical history significant for multifocal premature ventricular contractions, obstructive sleep apnea, tobacco abuse, and nonischemic cardiomyopathy who presents from home to [facility] care. Patient is been doing well. His EKG today shows no PVCs. His cardiomyopathy has resolved. EP standpoint patient is in good position. We will asked that he follow-up with us yearly. - Continue nadolol.  - Follow up with EP NP in 1 year unless/until procedure/discussion required or PRN. 01/06/2022  Patient resents for follow-up. Has been doing well. No symptomatic palpitations or PVCs. No PVCs noted EKG today however he is bradycardic with heart rates in the high 40s to 50s. He is currently taking nadolol 20 mg twice daily. We will decrease this to 20 mg daily.   If his symptoms recur we will increase him back up to twice daily dosing. We will have him follow-up with us in 1 year or as needed. - Decrease nadolol t 20 mg daily.  - Follow up with EP NP in 1 year unless/until procedure/discussion required or PRN. 2. Sinus bradycardia on nadolol. Asymptomatic. Heart rate 50s. No fatigue or exercise intolerance. - Continue to follow clinically. - Plan as per above. 1/6/2022  - Plan as per above. RECOMMENDATIONS:  1. Decrease nadolol to once daily. Ok to increase back to twice daily needed. 2. Recommend smoking cessation: [id number]  3. Follow up in one year. QUALITY MEASURES  1. Tobacco Cessation Counseling: Yes  2. Retake of BP if >140/90:   NA  3. Documentation to PCP/referring for new patient:  Sent to PCP at close of office visit  4. CAD patient on anti-platelet: NA  5. CAD patient on STATIN therapy:  NA  6. Patient with CHF and aFib on anticoagulation:  NA     All questions and concerns were addressed to the patient/family. Alternatives to my treatment were discussed. Ashutosh Ramirez RN, am scribing for and in the presence of Dr. Sancho Escobar. 01/06/22 10:33 AM   Letitia Ernandez RN    The scribe's documentation has been prepared under my direction and personally reviewed by me in its entirety. I confirm that the note above accurately reflects all work, physical examination, the discussion of treatments and procedures, and medical decision making performed by me. Tayler Cuevas MD personally performed the services described in this documentation as scribed by nurse in my presence, and is both accurate and complete. Electronically signed by Yaa Carballo MD on 1/6/2022 at 10:41 AM     Dr. Sarah Escobedo MD  Electrophysiology  [facility] [facility]. [address]. [place], [address]  Phone: [phone]  Fax: [phone]   1/6/2022     NOTE: This report was transcribed using voice recognition software.  Every effort was made to ensure accuracy, however,

## 2022-01-07 DIAGNOSIS — I10 ESSENTIAL HYPERTENSION: ICD-10-CM

## 2022-01-07 DIAGNOSIS — I49.3 PVC (PREMATURE VENTRICULAR CONTRACTION): ICD-10-CM

## 2022-01-07 RX ORDER — NADOLOL 20 MG/1
20 TABLET ORAL DAILY
Qty: 90 TABLET | Refills: 5 | Status: SHIPPED | OUTPATIENT
Start: 2022-01-07

## 2022-01-07 NOTE — TELEPHONE ENCOUNTER
[facility] pharmacy called stating pt's nadolol script needs to have perimeters for bp in order to prescribe the way [facility] wants it. The ov notes state: RECOMMENDATIONS:  1. Decrease nadolol to once daily. Ok to increase back to twice daily needed. The sig on the script request sts:        Sig: Take 1 tablet by mouth daily TAKE ONE TABLET BY MOUTH TWICE A DAY         The pharmacy stated they need a certain BP amount to add to the increase info in order to fill it.  Please resend to [email or web address] with new sig

## 2022-01-28 ENCOUNTER — TELEPHONE (OUTPATIENT)
Dept: PULMONOLOGY | Age: 67
End: 2022-01-28

## 2022-01-28 ENCOUNTER — VIRTUAL VISIT (OUTPATIENT)
Dept: PULMONOLOGY | Age: 67
End: 2022-01-28
Payer: MEDICARE

## 2022-01-28 DIAGNOSIS — G47.33 OSA (OBSTRUCTIVE SLEEP APNEA): Primary | ICD-10-CM

## 2022-01-28 DIAGNOSIS — Z72.821 POOR SLEEP HYGIENE: ICD-10-CM

## 2022-01-28 DIAGNOSIS — F51.04 PSYCHOPHYSIOLOGICAL INSOMNIA: ICD-10-CM

## 2022-01-28 DIAGNOSIS — R53.83 OTHER FATIGUE: ICD-10-CM

## 2022-01-28 DIAGNOSIS — Z71.89 CPAP USE COUNSELING: ICD-10-CM

## 2022-01-28 PROCEDURE — 3017F COLORECTAL CA SCREEN DOC REV: CPT | Performed by: NURSE PRACTITIONER

## 2022-01-28 PROCEDURE — 99213 OFFICE O/P EST LOW 20 MIN: CPT | Performed by: NURSE PRACTITIONER

## 2022-01-28 PROCEDURE — 1123F ACP DISCUSS/DSCN MKR DOCD: CPT | Performed by: NURSE PRACTITIONER

## 2022-01-28 PROCEDURE — G8427 DOCREV CUR MEDS BY ELIG CLIN: HCPCS | Performed by: NURSE PRACTITIONER

## 2022-01-28 PROCEDURE — 4040F PNEUMOC VAC/ADMIN/RCVD: CPT | Performed by: NURSE PRACTITIONER

## 2022-01-28 ASSESSMENT — SLEEP AND FATIGUE QUESTIONNAIRES
HOW LIKELY ARE YOU TO NOD OFF OR FALL ASLEEP WHILE WATCHING TV: 1
HOW LIKELY ARE YOU TO NOD OFF OR FALL ASLEEP WHILE SITTING AND READING: 1
HOW LIKELY ARE YOU TO NOD OFF OR FALL ASLEEP IN A CAR, WHILE STOPPED FOR A FEW MINUTES IN TRAFFIC: 0
HOW LIKELY ARE YOU TO NOD OFF OR FALL ASLEEP WHILE SITTING INACTIVE IN A PUBLIC PLACE: 2
HOW LIKELY ARE YOU TO NOD OFF OR FALL ASLEEP WHILE SITTING QUIETLY AFTER LUNCH WITHOUT ALCOHOL: 1
ESS TOTAL SCORE: 7
HOW LIKELY ARE YOU TO NOD OFF OR FALL ASLEEP WHILE SITTING AND TALKING TO SOMEONE: 0
HOW LIKELY ARE YOU TO NOD OFF OR FALL ASLEEP WHILE LYING DOWN TO REST IN THE AFTERNOON WHEN CIRCUMSTANCES PERMIT: 2
HOW LIKELY ARE YOU TO NOD OFF OR FALL ASLEEP WHEN YOU ARE A PASSENGER IN A CAR FOR AN HOUR WITHOUT A BREAK: 0

## 2022-01-28 NOTE — PROGRESS NOTES
Patient ID: Clark Ford is a 77 y.o. male who is being seen today for   Chief Complaint   Patient presents with    1 Year Follow Up     sleep         HPI:     Clark Ford is a 77 y.o. male for televideo appointment via video and audio doxy. me virtual visit for ARGELIA follow up. States he is doing pretty well with CPAP. Patient is using CPAP  6-7 hrs/night. Using humidifier. No snoring on CPAP. The pressure is well tolerated. The mask is comfortable- nasal pillows with chin strap. No mask leak. No significant daytime sleepiness. No nodding off when driving. No dry nose or throat. Some fatigue. Bedtime is 8-9 pm and rise time is 3-330 am- would like to sleep later. Watches TV if wakes. Sleep onset is few minutes. Wakes up 1 times at night total. 1 nocturia. States when wakes up, he is up. Rare naps during the day. No headache in am. No weight gain. 1-2 caffienated beverages during the day. No alcohol.  ESS is 7    Sleep Medicine 1/28/2022 1/26/2021 2/11/2020 11/5/2019 6/28/2019   Sitting and reading 1 0 1 0 3   Watching TV 1 0 1 2 2   Sitting, inactive in a public place (e.g. a theatre or a meeting) 2 0 0 0 3   As a passenger in a car for an hour without a break 0 0 0 0 0   Lying down to rest in the afternoon when circumstances permit 2 1 1 3 2   Sitting and talking to someone 0 0 0 0 0   Sitting quietly after a lunch without alcohol 1 0 0 0 1   In a car, while stopped for a few minutes in traffic 0 0 0 0 0   Total score 7 1 3 5 11   Neck circumference - - 14.5 14 14       Past Medical History:  Past Medical History:   Diagnosis Date    Abnormal echocardiogram 04/2019    Hyperlipidemia     Hypertension     Myocardiopathy ([address] 3/2/2021    Pro[facility] ([facility] [address] 2012    total prostatectomy,Naeem    Sleep apnea     Smoker        Past Surgical History:        Procedure Laterality Date    CARDIAC CATHETERIZATION  04/23/2019    Non Obs CAD, medical management    COLONOSCOPY  3/22/12   [address] [place] [address] 7/25/19 PSG AHI 10.6/REM AHI 50.9, low SPO2 81%, PLMS 16.7  8/27/2019 controlled ARGELIA on CPAP, PLMS 23.1, recommendation CPAP 9 cm H2O    CPAP compliance data:  Compliance download report from 1/12/20 to 2/10/20 reviewed today by me and showed patient is using machine 6:32 hrs/night with 100% compliance and AHI 0.8 within this time frame. 30/30days with greater than 4 hours of machine use. CPAP 9 cm H20. Compliance download report from 12/26/20 to 1/24/20 reviewed today by me and showed patient is using machine 6:08 hrs/night with 93% compliance and AHI 0.7 within this time frame. 28/30days with greater than 4 hours of machine use. CPAP 9 cm H20     Compliance download report from 12/26/20 to 1/24/20 reviewed today by me and showed patient is using machine 6:08 hrs/night with 93% compliance and AHI 0.7 within this time frame. 28/30days with greater than 4 hours of machine use. CPAP 9 cm H20       Compliance download report from 12/27/21 to 1/25/22 reviewed today by me and showed patient is using machine 6:05 hrs/night with 93% compliance and AHI 0.5 within this time frame. 28/30days with greater than 4 hours of machine use. CPAP 9 cm H20       Assessment:      · Mild ARGELIA. CPAP 9 cm H2O. Optimal compliance and efficacy on review today  · PLMSdenies RLS symptoms  · Hypertension, CAD, PVCs- followed by cardiology  · DOT   · Sleep maintenance insomniapsychophysiological hyperarousal, poor sleep hygiene, early bedtime likely contributing    Plan:       -Send order to change to CPAP 10 cm H2O  - Advised to use CPAP 6-8 hrs at night and during naps. - Replacement of mask, tubing, head straps every 3-6 months or sooner if damaged.    - Patient instructed to contact [employer] company for any mask, tubing or machine trouble shooting if problems arise.  - Sleep hygiene  -Cognitive behavioral therapy was discussed with patient including stimulus control and sleep restriction  -Recommend delay bedtime, no naps in the daytime, no TV if wakes at night  - Avoid sedatives, alcohol and caffeinated drinks at bed time. - Patient counseled to never drive or operate heavy machinery while fatigue, drowsy or sleepy or if has not used CPAP at least 4 hours the night prior-patient verbalized understanding and agrees. - Weight loss is recommended as a long-term intervention.    - Complications of ARGELIA if not treated were discussed with patient patient, including: systemic hypertension, pulmonary hypertension, cardiovascular morbidities, car accidents and all cause mortality.  -Patient education provided regarding sleep tips and CPAP cleaning recommendations     Follow up: 1 year, sooner if needed    Consent for telehealth visit was obtained and is noted in chart      [address] Rigo Melgar is a 77 y.o. male being evaluated by a Virtual Visit (video visit) encounter to address concerns as mentioned above. A caregiver was present when appropriate. Due to this being a TeleHealth encounter (During KTAST-82 public health emergency), evaluation of the following organ systems was limited: Vitals/Constitutional/EENT/Resp/CV/GI//MS/Neuro/Skin/Heme-Lymph-Imm. Pursuant to the emergency declaration under the [facility], [address] [place] [address] authority and the [employer] Act, this Virtual Visit was conducted with patient's (and/or legal guardian's) consent, to reduce the patient's risk of exposure to COVID-19 and provide necessary medical care. The patient (and/or legal guardian) has also been advised to contact this office for worsening conditions or problems, and seek emergency medical treatment and/or call 911 if deemed necessary.      Patient identification was verified at the start of the visit: Yes    Total time spent for this encounter: Not billed by time    Services were provided through a video synchronous discussion virtually to substitute for in-person clinic visit. Patient and provider were located at their individual homes. --Aguilar Cody, OLIVIER Mcmillan CNP on 1/28/2022 at 11:27 AM    An electronic signature was used to authenticate this note.

## 2022-01-28 NOTE — TELEPHONE ENCOUNTER
Within this Telehealth Consent, the terms you and yours refer to the person using the Telehealth Service (Service), or in the case of a use of the Service by or on behalf of a minor, you and yours refer to and include (i) the parent or legal guardian who provides consent to the use of the Service by such minor or uses the Service on behalf of such minor, and (ii) the minor for whom consent is being provided or on whose behalf the Service is being utilized. When using Service, you will be consulting with your health care providers via the use of Telehealth.   Telehealth involves the delivery of healthcare services using electronic communications, information technology or other means between a healthcare provider and a patient who are not in the same physical location. Telehealth may be used for diagnosis, treatment, follow-up and/or patient education, and may include, but is not limited to, one or more of the following:    Electronic transmission of medical records, photo images, personal health information or other data between a patient and a healthcare provider    Interactions between a patient and healthcare provider via audio, video and/or data communications    Use of output data from medical devices, sound and video files    Anticipated Benefits   The use of Telehealth by your Provider(s) through the Service may have the following possible benefits:    Making it easier and more efficient for you to access medical care and treatment for the conditions treated by such Provider(s) utilizing the Service    Allowing you to obtain medical care and treatment by Provider(s) at times that are convenient for you    Enabling you to interact with Provider(s) without the necessity of an in-office appointment     Possible Risks   While the use of Telehealth can provide potential benefits for you, there are also potential risks associated with the use of Telehealth.  These risks include, but may not be limited to the following:    Your Provider(s) may not able to provide medical treatment for your particular condition and you may be required to seek alternative healthcare or emergency care services.  The electronic systems or other security protocols or safeguards used in the Service could fail, causing a breach of privacy of your medical or other information.  Given regulatory requirements in certain jurisdictions, your Provider(s) diagnosis and/or treatment options, especially pertaining to certain prescriptions, may be limited. Acceptance   1. You understand that Services will be provided via Telehealth. This process involves the use of HIPAA compliant and secure, real-time audio-visual interfacing with a qualified and appropriately trained provider located at [facility]. 2. You understand that, under no circumstances, will this session be recorded. 3. You understand that the Provider(s) at [facility] and other clinical participants will be party to the information obtained during the Telehealth session in accordance with best medical practices. 4. You understand that the information obtained during the Telehealth session will be used to help determine the most appropriate treatment options. 5. You understand that You have the right to revoke this consent at any point in time. 6. You understand that Telehealth is voluntary, and that continued treatment is not dependent upon consent. 7. You understand that, in the event of non-consent to Telehealth services and/or technical difficulties, you will obtain services as typically provided in the absence of Telehealth technology. 8. You understand that this consent will be kept in Your medical record. 9. No potential benefits from the use of Telehealth or specific results can be guaranteed. Your condition may not be cured or improved and, in some cases, may get worse.    10. There are limitations in the provision of medical care and treatment via Telehealth and the Service and you may not be able to receive diagnosis and/or treatment through the Service for every condition for which you seek diagnosis and/or treatment. 11. There are potential risks to the use of Telehealth, including but not limited to the risks described in this Telehealth Consent. 12. Your Provider(s) have discussed the use of Telehealth and the Service with you, including the benefits and risks of such and you have provided oral consent to your Provider(s) for the use of Telehealth and the Service. 15. You understand that it is your duty to provide your Provider(s) truthful, accurate and complete information, including all relevant information regarding care that you may have received or may be receiving from other healthcare providers outside of the Service. 14. You understand that each of your Provider(s) may determine in his or sole discretion that your condition is not suitable for diagnosis and/or treatment using the Service, and that you may need to seek medical care and treatment a specialist or other healthcare provider, outside of the Service. 15. You understand that you are fully responsible for payment for all services provided by Provider(s) or through use of the Service and that you may not be able to use third-party insurance. 16. You represent that (a) you have read this Telehealth Consent carefully, (b) you understand the risks and benefits of the Service and the use of Telehealth in the medical care and treatment provided to you by Provider(s) using the Service, and (c) you have the legal capacity and authority to provide this consent for yourself and/or the minor for which you are consenting under applicable federal and state laws, including laws relating to the age of [de-identified] and/or parental/guardian consent.    17. You give your informed consent to the use of Telehealth by Provider(s) using the Service under the terms described in the Terms of Service and this Telehealth Consent. The patient was read the following statement and has consented to the visit as of 1/28/22. The patient has been scheduled for their first telehealth visit on 1/28/22 with Meng Norris CNP.

## 2022-01-28 NOTE — PATIENT INSTRUCTIONS

## 2022-02-17 ENCOUNTER — OFFICE VISIT (OUTPATIENT)
Dept: FAMILY MEDICINE CLINIC | Age: 67
End: 2022-02-17
Payer: MEDICARE

## 2022-02-17 VITALS
DIASTOLIC BLOOD PRESSURE: 72 MMHG | OXYGEN SATURATION: 95 % | TEMPERATURE: 97.2 F | HEART RATE: 50 BPM | SYSTOLIC BLOOD PRESSURE: 132 MMHG | WEIGHT: 148 LBS | BODY MASS INDEX: 24.63 KG/M2

## 2022-02-17 DIAGNOSIS — Z23 INFLUENZA VACCINATION ADMINISTERED AT CURRENT VISIT: ICD-10-CM

## 2022-02-17 DIAGNOSIS — Z85.46 HISTORY OF MALIGNANT NEOPLASM OF PROSTATE: ICD-10-CM

## 2022-02-17 DIAGNOSIS — I10 ESSENTIAL HYPERTENSION: Primary | ICD-10-CM

## 2022-02-17 DIAGNOSIS — G47.33 OSA (OBSTRUCTIVE SLEEP APNEA): ICD-10-CM

## 2022-02-17 DIAGNOSIS — Z72.0 TOBACCO ABUSE: ICD-10-CM

## 2022-02-17 DIAGNOSIS — I25.119 ATHEROSCLEROSIS OF NATIVE CORONARY ARTERY OF NATIVE HEART WITH ANGINA PECTORIS (HCC): ICD-10-CM

## 2022-02-17 DIAGNOSIS — E78.5 HYPERLIPIDEMIA LDL GOAL <130: ICD-10-CM

## 2022-02-17 DIAGNOSIS — I49.3 PVC (PREMATURE VENTRICULAR CONTRACTION): ICD-10-CM

## 2022-02-17 DIAGNOSIS — R73.03 PREDIABETES: ICD-10-CM

## 2022-02-17 LAB
A/G RATIO: 1.8 (ref 1.1–2.2)
ALBUMIN SERPL-MCNC: 4.7 G/DL (ref 3.4–5)
ALP BLD-CCNC: 82 U/L (ref 40–129)
ALT SERPL-CCNC: 29 U/L (ref 10–40)
ANION GAP SERPL CALCULATED.3IONS-SCNC: 14 MMOL/L (ref 3–16)
AST SERPL-CCNC: 20 U/L (ref 15–37)
BASOPHILS ABSOLUTE: 0 K/UL (ref 0–0.2)
BASOPHILS RELATIVE PERCENT: 0.5 %
BILIRUB SERPL-MCNC: 0.4 MG/DL (ref 0–1)
BUN BLDV-MCNC: 17 MG/DL (ref 7–20)
CALCIUM SERPL-MCNC: 10.1 MG/DL (ref 8.3–10.6)
CHLORIDE BLD-SCNC: 100 MMOL/L (ref 99–110)
CHOLESTEROL, TOTAL: 150 MG/DL (ref 0–199)
CO2: 28 MMOL/L (ref 21–32)
CREAT SERPL-MCNC: 0.8 MG/DL (ref 0.8–1.3)
CREATININE URINE: 49.9 MG/DL (ref 39–259)
EOSINOPHILS ABSOLUTE: 0.4 K/UL (ref 0–0.6)
EOSINOPHILS RELATIVE PERCENT: 6.2 %
GFR AFRICAN AMERICAN: >60
GFR NON-AFRICAN AMERICAN: >60
GLUCOSE BLD-MCNC: 102 MG/DL (ref 70–99)
HCT VFR BLD CALC: 47.1 % (ref 40.5–52.5)
HDLC SERPL-MCNC: 40 MG/DL (ref 40–60)
HEMOGLOBIN: 16.2 G/DL (ref 13.5–17.5)
LDL CHOLESTEROL CALCULATED: 80 MG/DL
LYMPHOCYTES ABSOLUTE: 1.9 K/UL (ref 1–5.1)
LYMPHOCYTES RELATIVE PERCENT: 31.5 %
MCH RBC QN AUTO: 32.3 PG (ref 26–34)
MCHC RBC AUTO-ENTMCNC: 34.4 G/DL (ref 31–36)
MCV RBC AUTO: 93.8 FL (ref 80–100)
MONOCYTES ABSOLUTE: 0.7 K/UL (ref 0–1.3)
MONOCYTES RELATIVE PERCENT: 10.6 %
NEUTROPHILS ABSOLUTE: 3.1 K/UL (ref 1.7–7.7)
NEUTROPHILS RELATIVE PERCENT: 51.2 %
PDW BLD-RTO: 13.5 % (ref 12.4–15.4)
PLATELET # BLD: 220 K/UL (ref 135–450)
PMV BLD AUTO: 8.4 FL (ref 5–10.5)
POTASSIUM SERPL-SCNC: 3.8 MMOL/L (ref 3.5–5.1)
PROSTATE SPECIFIC ANTIGEN: <0.01 NG/ML (ref 0–4)
PROTEIN PROTEIN: 5 MG/DL
PROTEIN/CREAT RATIO: 0.1 MG/DL
RBC # BLD: 5.02 M/UL (ref 4.2–5.9)
SODIUM BLD-SCNC: 142 MMOL/L (ref 136–145)
TOTAL PROTEIN: 7.3 G/DL (ref 6.4–8.2)
TRIGL SERPL-MCNC: 148 MG/DL (ref 0–150)
VLDLC SERPL CALC-MCNC: 30 MG/DL
WBC # BLD: 6.1 K/UL (ref 4–11)

## 2022-02-17 PROCEDURE — G8484 FLU IMMUNIZE NO ADMIN: HCPCS | Performed by: NURSE PRACTITIONER

## 2022-02-17 PROCEDURE — 3017F COLORECTAL CA SCREEN DOC REV: CPT | Performed by: NURSE PRACTITIONER

## 2022-02-17 PROCEDURE — G8427 DOCREV CUR MEDS BY ELIG CLIN: HCPCS | Performed by: NURSE PRACTITIONER

## 2022-02-17 PROCEDURE — 36415 COLL VENOUS BLD VENIPUNCTURE: CPT | Performed by: NURSE PRACTITIONER

## 2022-02-17 PROCEDURE — 4004F PT TOBACCO SCREEN RCVD TLK: CPT | Performed by: NURSE PRACTITIONER

## 2022-02-17 PROCEDURE — 90694 VACC AIIV4 NO PRSRV 0.5ML IM: CPT | Performed by: NURSE PRACTITIONER

## 2022-02-17 PROCEDURE — G8420 CALC BMI NORM PARAMETERS: HCPCS | Performed by: NURSE PRACTITIONER

## 2022-02-17 PROCEDURE — G0008 ADMIN INFLUENZA VIRUS VAC: HCPCS | Performed by: NURSE PRACTITIONER

## 2022-02-17 PROCEDURE — 99214 OFFICE O/P EST MOD 30 MIN: CPT | Performed by: NURSE PRACTITIONER

## 2022-02-17 PROCEDURE — 4040F PNEUMOC VAC/ADMIN/RCVD: CPT | Performed by: NURSE PRACTITIONER

## 2022-02-17 PROCEDURE — 1123F ACP DISCUSS/DSCN MKR DOCD: CPT | Performed by: NURSE PRACTITIONER

## 2022-02-17 RX ORDER — HYDROCHLOROTHIAZIDE 25 MG/1
TABLET ORAL
Qty: 90 TABLET | Refills: 3 | Status: SHIPPED | OUTPATIENT
Start: 2022-02-17

## 2022-02-17 RX ORDER — PRAVASTATIN SODIUM 40 MG
TABLET ORAL
Qty: 90 TABLET | Refills: 3 | Status: SHIPPED | OUTPATIENT
Start: 2022-02-17

## 2022-02-17 RX ORDER — ISOSORBIDE MONONITRATE 30 MG/1
TABLET, EXTENDED RELEASE ORAL
Qty: 90 TABLET | Refills: 3 | Status: SHIPPED | OUTPATIENT
Start: 2022-02-17

## 2022-02-17 ASSESSMENT — ENCOUNTER SYMPTOMS
BLOOD IN STOOL: 0
COUGH: 0
CONSTIPATION: 0
CHEST TIGHTNESS: 0
SHORTNESS OF BREATH: 0
DIARRHEA: 0

## 2022-02-17 ASSESSMENT — PATIENT HEALTH QUESTIONNAIRE - PHQ9
SUM OF ALL RESPONSES TO PHQ QUESTIONS 1-9: 0
2. FEELING DOWN, DEPRESSED OR HOPELESS: 0
1. LITTLE INTEREST OR PLEASURE IN DOING THINGS: 0
SUM OF ALL RESPONSES TO PHQ QUESTIONS 1-9: 0
SUM OF ALL RESPONSES TO PHQ9 QUESTIONS 1 & 2: 0
SUM OF ALL RESPONSES TO PHQ QUESTIONS 1-9: 0
SUM OF ALL RESPONSES TO PHQ QUESTIONS 1-9: 0

## 2022-02-17 NOTE — PROGRESS NOTES
2/17/2022    Chief Complaint   Patient presents with    Hyperlipidemia     fasting    Hypertension       Fawad Martins is a 77 y.o. male, presents today for:      ASSESSMENT/PLAN:    1. Essential hypertension  Stable  Continue Nadolol, HCTZ, Isosorbide  Encouraged heart healthy diet  Encouraged 30 min activity daily  Labs today  - hydroCHLOROthiazide (HYDRODIURIL) 25 MG tablet; TAKE ONE TABLET BY MOUTH DAILY  Dispense: 90 tablet; Refill: 3  - CBC with Auto Differential  - Comprehensive Metabolic Panel  - Protein / creatinine ratio, urine    2. Atherosclerosis of native coronary artery of native heart with angina pectoris ([facility] [address]  Stable today  [facility] 2019 showing mild CAD  Continue statin  Labs today    3. History of malignant neoplasm of prostate  Asymptomatic today  Labs ordered  - PSA, Prostatic Specific Antigen    4. Prediabetes  Labs ordered  Encouraged decreased sugary food intake  - Hemoglobin A1C    5. PVC (premature ventricular contraction)  Stable today  Continue Isosorbide  - isosorbide mononitrate (IMDUR) 30 MG extended release tablet; TAKE ONE TABLET BY MOUTH DAILY  Dispense: 90 tablet; Refill: 3    6. ARGELIA (obstructive sleep apnea)  Stable today  Continue CPAP  Continue care with Pulm    7. Hyperlipidemia LDL goal <130  Stable today  Continue Pravastatin  Labs ordered today  - pravastatin (PRAVACHOL) 40 MG tablet; TAKE ONE TABLET BY MOUTH DAILY  Dispense: 90 tablet; Refill: 3  - Lipid Panel    8. Tobacco abuse  Encouraged cessation    9. Influenza vaccination administered at current visit  Influenza vaccination today. - INFLUENZA, QUADV, ADJUVANTED, 65 YRS =, IM, PF, PREFILL SYR, 0.5ML (FLUAD)      Return in about 6 months (around 8/17/2022) for AWv/ HTN. Presenting today for chronic disease follow up. No new questions/ concerns. Concerned about staying asleep. Wakes around 4-5 AM.  Started with recent time charge. No problem staying asleep.   Using cpap most nights.     CAD/ Bradycardia with PVCs/ HTN: Continuing to follow with Cardiology.  Asymptomatic today, no CP, SOB, leg swelling.  Nadolol dose recently decreased by Cardio but patient has not started yet. 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  Does not need refills.  Continuing to smoke 1/2ppd, not interested in quitting. HLD: Tolerating Atorvastatin well.  No myalgias, leg pain, SOB.   ARGELIA: Tolerating CPAP well, using every night. Recent OV with Pulm  Prostate Cancer: No longer following with Urology, obtaining PSA yearly through this office     Labs recently done. Colon cancer- discussed cologuard- will consider. PHQ Scores 2/17/2022 3/2/2021 2/24/2021 9/3/2020 3/22/2019 9/28/2018 5/22/2017   PHQ2 Score 0 0 0 0 0 0 0   PHQ9 Score 0 0 0 0 0 0 0     Interpretation of Total Score Depression Severity: 1-4 = Minimal depression, 5-9 = Mild depression, 10-14 = Moderate depression, 15-19 = Moderately severe depression, 20-27 = Severe depression    Lab Results   Component Value Date     02/17/2022    K 3.8 02/17/2022     02/17/2022    CO2 28 02/17/2022    BUN 17 02/17/2022    CREATININE 0.8 02/17/2022    GLUCOSE 102 (H) 02/17/2022    CALCIUM 10.1 02/17/2022    PROT 7.3 02/17/2022    LABALBU 4.7 02/17/2022    BILITOT 0.4 02/17/2022    ALKPHOS 82 02/17/2022    AST 20 02/17/2022    ALT 29 02/17/2022    LABGLOM >60 02/17/2022    GFRAA >60 02/17/2022    AGRATIO 1.8 02/17/2022    GLOB 2.4 02/23/2021         Review of Systems   Constitutional: Negative. Respiratory: Negative for cough, chest tightness and shortness of breath. Cardiovascular: Negative. Gastrointestinal: Negative for blood in stool, constipation and diarrhea. Skin: Negative. Neurological: Negative for dizziness, tremors, light-headedness and headaches. Psychiatric/Behavioral: Negative for decreased concentration, dysphoric mood, self-injury, sleep disturbance and suicidal ideas. The patient is not nervous/anxious.         Current Outpatient Medications on File Prior to Visit   Medication Sig Dispense Refill    Multiple Vitamin (MULTIVITAMIN ADULT PO) Take 1 tablet by mouth daily      nadolol (CORGARD) 20 MG tablet Take 1 tablet by mouth daily TAKE ONE TABLET BY MOUTH DAILY. Ok to increase to twice daily as needed for palpitations. 90 tablet 5    aspirin 325 MG tablet Take 325 mg by mouth daily      niacin 500 MG tablet Take 500 mg by mouth daily. No current facility-administered medications on file prior to visit. No Known Allergies  Past Medical History:   Diagnosis Date    Abnormal echocardiogram 04/2019    Hyperlipidemia     Hypertension     Myocardiopathy ([facility] [address] 3/2/2021    Prostate cancer ([facility] [address] 2012    total prostatectomy,DrBey    Sleep apnea     Smoker      Past Surgical History:   Procedure Laterality Date    CARDIAC CATHETERIZATION  04/23/2019    Non Obs CAD, medical management    COLONOSCOPY  3/22/12    EYE SURGERY  1989    eye trauma os with fixed pupil;vision is ok;    PROSTATE BIOPSY      PROSTATECTOMY  02/14/2012     Social History     Tobacco Use    Smoking status: Current Every Day Smoker     Packs/day: 0.50     Years: 30.00     Pack years: 15.00     Types: Cigarettes    Smokeless tobacco: Current User   Substance Use Topics    Alcohol use: Yes     Alcohol/week: 0.0 standard drinks     Comment: rare     Family History   Problem Relation Age of Onset    Stroke Father     Heart Disease Father     Diabetes Father        Vitals:    02/17/22 0840   BP: 132/72   Pulse: 50   Temp: 97.2 °F (36.2 °C)   TempSrc: Infrared   SpO2: 95%   Weight: 148 lb (67.1 kg)     Estimated body mass index is 24.63 kg/m² as calculated from the following:    Height as of 1/6/22: 5' 5\" (1.651 m). Weight as of this encounter: 148 lb (67.1 kg). Physical Exam  Vitals reviewed. Constitutional:       Appearance: Normal appearance. HENT:      Head: Normocephalic. Neck:      Vascular: No carotid bruit.    Cardiovascular: Rate and Rhythm: Normal rate and regular rhythm. Pulses: Normal pulses. Heart sounds: Normal heart sounds. No murmur heard. No gallop. Pulmonary:      Effort: Pulmonary effort is normal.      Breath sounds: Normal breath sounds. Abdominal:      General: Abdomen is flat. Palpations: Abdomen is soft. Musculoskeletal:         General: Normal range of motion. Skin:     General: Skin is warm and dry. Capillary Refill: Capillary refill takes less than 2 seconds. Neurological:      General: No focal deficit present. Mental Status: He is alert and oriented to person, place, and time. Psychiatric:         Mood and Affect: Mood normal.         Behavior: Behavior normal.           Patient's questions answered and concerns addressed. Patient agrees to plan of care.         Electronically signed by OLIVIER Cartwright CNP on 2/17/2022 at 7:17 PM

## 2022-02-17 NOTE — PATIENT INSTRUCTIONS
Patient Education        DASH Diet: Care Instructions  Your Care Instructions     The DASH diet is an eating plan that can help lower your blood pressure. DASH stands for Dietary Approaches to Stop Hypertension. Hypertension is high blood pressure. The DASH diet focuses on eating foods that are high in calcium, potassium, and magnesium. These nutrients can lower blood pressure. The foods that are highest in these nutrients are fruits, vegetables, low-fat dairy products, nuts, seeds, and legumes. But taking calcium, potassium, and magnesium supplements instead of eating foods that are high in those nutrients does not have the same effect. The DASH diet also includes whole grains, fish, and poultry. The DASH diet is one of several lifestyle changes your doctor may recommend to lower your high blood pressure. Your doctor may also want you to decrease the amount of sodium in your diet. Lowering sodium while following the DASH diet can lower blood pressure even further than just the DASH diet alone. Follow-up care is a key part of your treatment and safety. Be sure to make and go to all appointments, and call your doctor if you are having problems. It's also a good idea to know your test results and keep a list of the medicines you take. How can you care for yourself at home? Following the DASH diet  · Eat 4 to 5 servings of fruit each day. A serving is 1 medium-sized piece of fruit, ½ cup chopped or canned fruit, 1/4 cup dried fruit, or 4 ounces (½ cup) of fruit juice. Choose fruit more often than fruit juice. · Eat 4 to 5 servings of vegetables each day. A serving is 1 cup of lettuce or raw leafy vegetables, ½ cup of chopped or cooked vegetables, or 4 ounces (½ cup) of vegetable juice. Choose vegetables more often than vegetable juice. · Get 2 to 3 servings of low-fat and fat-free dairy each day. A serving is 8 ounces of milk, 1 cup of yogurt, or 1 ½ ounces of cheese. · Eat 6 to 8 servings of grains each day. A serving is 1 slice of bread, 1 ounce of dry cereal, or ½ cup of cooked rice, pasta, or cooked cereal. Try to choose whole-grain products as much as possible. · Limit lean meat, poultry, and fish to 2 servings each day. A serving is 3 ounces, about the size of a deck of cards. · Eat 4 to 5 servings of nuts, seeds, and legumes (cooked dried beans, lentils, and split peas) each week. A serving is 1/3 cup of nuts, 2 tablespoons of seeds, or ½ cup of cooked beans or peas. · Limit fats and oils to 2 to 3 servings each day. A serving is 1 teaspoon of vegetable oil or 2 tablespoons of salad dressing. · Limit sweets and added sugars to 5 servings or less a week. A serving is 1 tablespoon jelly or jam, ½ cup sorbet, or 1 cup of lemonade. · Eat less than 2,300 milligrams (mg) of sodium a day. If you limit your sodium to 1,500 mg a day, you can lower your blood pressure even more. · Be aware that all of these are the suggested number of servings for people who eat 1,800 to 2,000 calories a day. Your recommended number of servings may be different if you need more or fewer calories. Tips for success  · Start small. Do not try to make dramatic changes to your diet all at once. You might feel that you are missing out on your favorite foods and then be more likely to not follow the plan. Make small changes, and stick with them. Once those changes become habit, add a few more changes. · Try some of the following:  ? Make it a goal to eat a fruit or vegetable at every meal and at snacks. This will make it easy to get the recommended amount of fruits and vegetables each day. ? Try yogurt topped with fruit and nuts for a snack or healthy dessert. ? Add lettuce, tomato, cucumber, and onion to sandwiches. ? Combine a ready-made pizza crust with low-fat mozzarella cheese and lots of vegetable toppings. Try using tomatoes, squash, spinach, broccoli, carrots, cauliflower, and onions. ?  Have a variety of cut-up vegetables with a low-fat dip as an appetizer instead of chips and dip. ? Sprinkle sunflower seeds or chopped almonds over salads. Or try adding chopped walnuts or almonds to cooked vegetables. ? Try some vegetarian meals using beans and peas. Add garbanzo or kidney beans to salads. Make burritos and tacos with mashed chiang beans or black beans. Where can you learn more? Go to https://[employer].[facility]. org and sign in to your [employer] account. Enter D803 in the [employer]) box to learn more about \"DASH Diet: Care Instructions. \"     If you do not have an account, please click on the \"Sign Up Now\" link. Current as of: April 29, 2021               Content Version: 13.1  © 3304-4493 [employer]. Care instructions adapted under license by [facility] ([facility]). If you have questions about a medical condition or this instruction, always ask your healthcare professional. [address] any warranty or liability for your use of this information.

## 2022-02-18 LAB
ESTIMATED AVERAGE GLUCOSE: 139.9 MG/DL
HBA1C MFR BLD: 6.5 %

## 2022-02-18 NOTE — RESULT ENCOUNTER NOTE
Have now developed diabetes. No need for medication but please watch your sugar intake. If it gets much higher at your next appt we will need to add medication.

## 2022-02-18 NOTE — RESULT ENCOUNTER NOTE
Cholesterol is normal.  Liver/ kidney function is normal. Prostate level is normal.  NO anemia/ infection. No protein in your urine. What we were hoping ot see. Good report today. Please call if you have additional questions and/ or concerns. Please keep your next appt. Thank you.

## 2022-03-09 NOTE — TELEPHONE ENCOUNTER
CPAP download report from 2/6/2022-3/7/2022 on CPAP 10 cm H2O reviewed. Compliance is good 80%.   AHI is good 0.6

## 2022-08-10 SDOH — HEALTH STABILITY: PHYSICAL HEALTH: ON AVERAGE, HOW MANY DAYS PER WEEK DO YOU ENGAGE IN MODERATE TO STRENUOUS EXERCISE (LIKE A BRISK WALK)?: 7 DAYS

## 2022-08-10 SDOH — HEALTH STABILITY: PHYSICAL HEALTH: ON AVERAGE, HOW MANY MINUTES DO YOU ENGAGE IN EXERCISE AT THIS LEVEL?: 150+ MIN

## 2022-08-10 ASSESSMENT — PATIENT HEALTH QUESTIONNAIRE - PHQ9
SUM OF ALL RESPONSES TO PHQ QUESTIONS 1-9: 0
2. FEELING DOWN, DEPRESSED OR HOPELESS: 0
SUM OF ALL RESPONSES TO PHQ QUESTIONS 1-9: 0
1. LITTLE INTEREST OR PLEASURE IN DOING THINGS: 0
SUM OF ALL RESPONSES TO PHQ QUESTIONS 1-9: 0
SUM OF ALL RESPONSES TO PHQ QUESTIONS 1-9: 0
SUM OF ALL RESPONSES TO PHQ9 QUESTIONS 1 & 2: 0

## 2022-08-10 ASSESSMENT — LIFESTYLE VARIABLES
HOW OFTEN DO YOU HAVE A DRINK CONTAINING ALCOHOL: MONTHLY OR LESS
HOW OFTEN DO YOU HAVE A DRINK CONTAINING ALCOHOL: 2
HOW OFTEN DO YOU HAVE SIX OR MORE DRINKS ON ONE OCCASION: 1

## 2022-08-11 ENCOUNTER — OFFICE VISIT (OUTPATIENT)
Dept: FAMILY MEDICINE CLINIC | Age: 67
End: 2022-08-11
Payer: MEDICARE

## 2022-08-11 VITALS
WEIGHT: 146 LBS | BODY MASS INDEX: 24.3 KG/M2 | DIASTOLIC BLOOD PRESSURE: 62 MMHG | SYSTOLIC BLOOD PRESSURE: 130 MMHG | OXYGEN SATURATION: 98 % | HEART RATE: 49 BPM

## 2022-08-11 DIAGNOSIS — Z71.89 ACP (ADVANCE CARE PLANNING): ICD-10-CM

## 2022-08-11 DIAGNOSIS — E11.9 TYPE 2 DIABETES MELLITUS WITHOUT COMPLICATION, WITHOUT LONG-TERM CURRENT USE OF INSULIN (HCC): ICD-10-CM

## 2022-08-11 DIAGNOSIS — E78.5 HYPERLIPIDEMIA LDL GOAL <130: ICD-10-CM

## 2022-08-11 DIAGNOSIS — Z85.46 HISTORY OF MALIGNANT NEOPLASM OF PROSTATE: ICD-10-CM

## 2022-08-11 DIAGNOSIS — G47.33 OSA (OBSTRUCTIVE SLEEP APNEA): ICD-10-CM

## 2022-08-11 DIAGNOSIS — I10 ESSENTIAL HYPERTENSION: ICD-10-CM

## 2022-08-11 DIAGNOSIS — Z23 ENCOUNTER FOR HERPES ZOSTER VACCINATION: ICD-10-CM

## 2022-08-11 DIAGNOSIS — Z72.0 TOBACCO ABUSE: ICD-10-CM

## 2022-08-11 DIAGNOSIS — Z23 NEED FOR STREPTOCOCCUS PNEUMONIAE VACCINATION: ICD-10-CM

## 2022-08-11 DIAGNOSIS — Z00.00 INITIAL MEDICARE ANNUAL WELLNESS VISIT: Primary | ICD-10-CM

## 2022-08-11 DIAGNOSIS — I50.22 CHRONIC SYSTOLIC (CONGESTIVE) HEART FAILURE (HCC): ICD-10-CM

## 2022-08-11 LAB
ANION GAP SERPL CALCULATED.3IONS-SCNC: 12 MMOL/L (ref 3–16)
BUN BLDV-MCNC: 21 MG/DL (ref 7–20)
CALCIUM SERPL-MCNC: 9.8 MG/DL (ref 8.3–10.6)
CHLORIDE BLD-SCNC: 99 MMOL/L (ref 99–110)
CO2: 30 MMOL/L (ref 21–32)
CREAT SERPL-MCNC: 0.8 MG/DL (ref 0.8–1.3)
GFR AFRICAN AMERICAN: >60
GFR NON-AFRICAN AMERICAN: >60
GLUCOSE BLD-MCNC: 95 MG/DL (ref 70–99)
POTASSIUM SERPL-SCNC: 4.6 MMOL/L (ref 3.5–5.1)
SODIUM BLD-SCNC: 141 MMOL/L (ref 136–145)

## 2022-08-11 PROCEDURE — G0438 PPPS, INITIAL VISIT: HCPCS | Performed by: NURSE PRACTITIONER

## 2022-08-11 PROCEDURE — 3044F HG A1C LEVEL LT 7.0%: CPT | Performed by: NURSE PRACTITIONER

## 2022-08-11 PROCEDURE — 36415 COLL VENOUS BLD VENIPUNCTURE: CPT | Performed by: NURSE PRACTITIONER

## 2022-08-11 PROCEDURE — 90677 PCV20 VACCINE IM: CPT | Performed by: NURSE PRACTITIONER

## 2022-08-11 PROCEDURE — 3017F COLORECTAL CA SCREEN DOC REV: CPT | Performed by: NURSE PRACTITIONER

## 2022-08-11 PROCEDURE — 90471 IMMUNIZATION ADMIN: CPT | Performed by: NURSE PRACTITIONER

## 2022-08-11 PROCEDURE — 1123F ACP DISCUSS/DSCN MKR DOCD: CPT | Performed by: NURSE PRACTITIONER

## 2022-08-11 RX ORDER — ZOSTER VACCINE RECOMBINANT, ADJUVANTED 50 MCG/0.5
0.5 KIT INTRAMUSCULAR SEE ADMIN INSTRUCTIONS
Qty: 0.5 ML | Refills: 0 | Status: SHIPPED | OUTPATIENT
Start: 2022-08-11 | End: 2023-02-07

## 2022-08-11 NOTE — PATIENT INSTRUCTIONS
Learning About Living [place]  What is a living will? A living will, also called a declaration, is a legal form. It tells your family and your doctor your wishes when you can't speak for yourself. It's used by the health professionals who will treat you as you near the end of your life or ifyou get seriously hurt or ill. If you put your wishes in writing, your loved ones and others will know what kind of care you want. They won't need to guess. This can ease your mind and behelpful to others. And you can change or cancel your living will at any time. A living will is not the same as an estate or property will. An estate willexplains what you want to happen with your money and property after you die. How do you use it? Keep these facts in mind about how a living will is used. Your living will is used only if you can't speak or make decisions for yourself. Most often, one or more doctors must certify that you can't speak or decide for yourself before your living will takes effect. If you get better and can speak for yourself again, you can accept or refuse any treatment. It doesn't matter what you said in your living will. Some states may limit your right to refuse treatment in certain cases. For example, you may need to clearly state in your living will that you don't want artificial hydration and nutrition, such as being fed through a tube. Is a living will a legal document? A living will is a legal document. Each state has its own laws about livingwills. And a living will may be called something else in your state. Here are some things to know about living marcano. You don't need an  to complete a living will. But legal advice can be helpful if your state's laws are unclear. It can also help if your health history is complicated or your family can't agree on what should be in your living will. You can change your living will at any time.  Some people find that their wishes about end-of-life care change as their health changes. If you make big changes to your living will, complete a new form. If you move to another state, make sure that your living will is legal in the state where you now live. In most cases, doctors will respect your wishes even if you have a form from a different state. You might use a universal form that has been approved by many states. This kind of form can sometimes be filled out and stored online. Your digital copy will then be available wherever you have a connection to the internet. The doctors and nurses who need to treat you can find it right away. Your state may offer an online registry. This is another place where you can store your living will online. It's a good idea to get your living will notarized. This means using a person called a Listar to watch two people sign, or witness, your living will. What should you know when you create a living will? Here are some questions to ask yourself as you make your living will. Do you know enough about life support methods that might be used? If not, talk to your doctor so you know what might be done if you can't breathe on your own, your heart stops, or you can't swallow. What things would you still want to be able to do after you receive life-support methods? Would you want to be able to walk? To speak? To eat on your own? To live without the help of machines? Do you want certain Anabaptism practices performed if you become very ill? If you have a choice, where do you want to be cared for? In your home? At a hospital or nursing home? If you have a choice at the end of your life, where would you prefer to die? At home? In a hospital or nursing home? Somewhere else? Would you prefer to be buried or cremated? Do you want your organs to be donated after you die? What should you do with your living will?   Make sure that your family members and your health care agent have copies of your living will (also called a declaration). Give your doctor a copy of your living will. Ask to have it kept as part of your medical record. If you have more than one doctor, make sure that each one has a copy. Put a copy of your living will where it can be easily found. For example, some people may put a copy on their refrigerator door. If you are using a digital copy, be sure your doctor, family members, and health care agent know how to find and access it. Where can you learn more? Go to https://[employer].51credit.com. org and sign in to your Wishdates account. Enter N032 in the [facility] box to learn more about \"Learning About Living Rob Cohen. \"     If you do not have an account, please click on the \"Sign Up Now\" link. Current as of: October 18, 2021               Content Version: 13.3  © 9622-3610 Healthwise, [employer]. Care instructions adapted under license by [facility] ([facility]). If you have questions about a medical condition or this instruction, always ask your healthcare professional. [place] [address] any warranty or liability for your use of this information. Personalized Preventive Plan for [facility] - 8/11/2022  Medicare offers a range of preventive health benefits. Some of the tests and screenings are paid in full while other may be subject to a deductible, co-insurance, and/or copay. Some of these benefits include a comprehensive review of your medical history including lifestyle, illnesses that may run in your family, and various assessments and screenings as appropriate. After reviewing your medical record and screening and assessments performed today your provider may have ordered immunizations, labs, imaging, and/or referrals for you. A list of these orders (if applicable) as well as your Preventive Care list are included within your After Visit Summary for your review.     Other Preventive Recommendations:    A preventive eye exam performed by an eye specialist is

## 2022-08-11 NOTE — PROGRESS NOTES
Medicare Annual Wellness Visit    Edwina Beaver is here for Medicare AWV, Hypertension, and Hyperlipidemia (Fasting )    Assessment & Plan   Initial Medicare annual wellness visit  AWV completed today, see documentation below  Chronic systolic (congestive) heart failure  Stable  Continue Nadolol, HCTZ, Isosorbide  Continue care with Cardio, appreciate assistance  Encouraged heart healthy diet  Encouraged 30 min activity daily    Essential hypertension  See above  Labs today  -     Basic Metabolic Panel  Type 2 diabetes mellitus without complication, without long-term current use of insulin ([address]  New diagnosis last OV, A1C 6.5%  Labs today  No medications indicated at this time. -     Hemoglobin A1C  History of malignant neoplasm of prostate  Asymptomatic today  PSA <0.01 2/2022  No longer following with Urology  ARGELIA (obstructive sleep apnea)  Stable today  Continue CPAP 10 cm H20  Continue care with Pulm  Hyperlipidemia LDL goal <130  Stable today  Continue Pravastatin  Labs ordered today  Tobacco abuse  Encouraged cessation, pt in precontemplation  ACP (advance care planning)  Preferences reviewed and updated  Need for Streptococcus pneumoniae vaccination  Prevnar given today  -     Pneumococcal, PCV20, PREVNAR 21, (age 25 yrs+), IM, PF  Encounter for herpes zoster vaccination  Discussed benefits of Shingles vaccination and need to obtain at pharmacy if interested. Encouraged to obtain in 2 weeks if interested. -     zoster recombinant adjuvanted vaccine [facility]) 50 MCG/0.5ML SUSR injection; Inject 0.5 mLs into the muscle See Admin Instructions 1 dose now and repeat in 2-6 months, Disp-0.5 mL, R-0Normal    Recommendations for Preventive Services Due: see orders and patient instructions/AVS.  Recommended screening schedule for the next 5-10 years is provided to the patient in written form: see Patient Instructions/AVS.     Return in 6 months (on 2/11/2023) for htn/ argelia, prostate ca.      Subjective   The following acute and/or chronic problems were also addressed today:  Presenting today for chronic disease follow up. No new questions/ concerns. CAD/ Bradycardia with PVCs/ HTN: Continuing to follow with Cardiology. Asymptomatic today, no CP, SOB, leg swelling. Nadolol dose recently decreased by Cardio but patient has not started yet. 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 Does not need refills. Continuing to smoke 1/2ppd, not interested in quitting. HLD: Tolerating Atorvastatin well. No myalgias, leg pain, SOB. ARGELIA: Tolerating CPAP well, using every night. Recent OV with Pulm  Prostate Cancer: No longer following with Urology, obtaining PSA yearly through this office    Patient's complete Health Risk Assessment and screening values have been reviewed and are found in Flowsheets. The following problems were reviewed today and where indicated follow up appointments were made and/or referrals ordered.     Positive Risk Factor Screenings with Interventions:    Fall Risk:  Do you feel unsteady or are you worried about falling? : no  2 or more falls in past year?: (!) yes  Fall with injury in past year?: no   Fall Risk Interventions:    Home safety tips provided      Tobacco Use:  Tobacco Use: High Risk    Smoking Tobacco Use: Every Day    Smokeless Tobacco Use: Current     E-cigarette/Vaping       Questions Responses    E-cigarette/Vaping Use Never User    Start Date     Passive Exposure     Quit Date     Counseling Given     Comments Unknown          Substance Use - Tobacco Interventions:  tobacco cessation tips and resources provided, patient is not ready to work toward tobacco cessation at this time         General Health and ACP:  General  In general, how would you say your health is?: Very Good  In the past 7 days, have you experienced any of the following: New or Increased Pain, New or Increased Fatigue, Loneliness, Social Isolation, Stress or Anger?: No  Do you get the social and emotional support that you need?: Yes  Do you have a Living Will?: (!) No    Advance Directives       Power of [address] Will ACP-Advance Directive ACP-Power of     Not on File Not on File Not on File Not on File          General Health Risk Interventions:  No Living Will: Advance Care Planning addressed with patient today    Health Habits/Nutrition:  Physical Activity: Sufficiently Active    Days of Exercise per Week: 7 days    Minutes of Exercise per Session: 150+ min     Have you lost any weight without trying in the past 3 months?: No     Have you seen the dentist within the past year?: (!) No  Health Habits/Nutrition Interventions:  Dental exam overdue:  patient encouraged to make appointment with his/her dentist, patient declines dental evaluation     Safety:  Do you have working smoke detectors?: Yes  Do you have any tripping hazards - loose or unsecured carpets or rugs?: (!) Yes  Do you have any tripping hazards - clutter in doorways, halls, or stairs?: No  Do you have either shower bars, grab bars, non-slip mats or non-slip surfaces in your shower or bathtub?: Yes  Do all of your stairways have a railing or banister?: Yes  Do you always fasten your seatbelt when you are in a car?: Yes  Safety Interventions:  Home safety tips provided           Objective   Vitals:    08/11/22 0901   BP: 130/62   Pulse: (!) 49   SpO2: 98%   Weight: 146 lb (66.2 kg)      Body mass index is 24.3 kg/m².       General Appearance: alert and oriented to person, place and time, well developed and well- nourished, in no acute distress  Skin: warm and dry, no rash or erythema  Head: normocephalic and atraumatic  Eyes: pupils equal, round, and reactive to light, extraocular eye movements intact, conjunctivae normal  ENT: tympanic membrane, external ear and ear canal normal bilaterally, nose without deformity, nasal mucosa and turbinates normal without polyps  Neck: supple and non-tender without mass, no thyromegaly or thyroid nodules, no cervical lymphadenopathy  Pulmonary/Chest: clear to auscultation bilaterally- no wheezes, rales or rhonchi, normal air movement, no respiratory distress  Cardiovascular: normal rate, regular rhythm, normal S1 and S2, no murmurs, rubs, clicks, or gallops, distal pulses intact, no carotid bruits  Abdomen: soft, non-tender, non-distended, normal bowel sounds, no masses or organomegaly  Extremities: no cyanosis, clubbing or edema  Musculoskeletal: normal range of motion, no joint swelling, deformity or tenderness  Neurologic: reflexes normal and symmetric, no cranial nerve deficit, gait, coordination and speech normal       No Known Allergies  Prior to Visit Medications    Medication Sig Taking? Authorizing Provider   zoster recombinant adjuvanted vaccine [facility]) 50 MCG/0.5ML SUSR injection Inject 0.5 mLs into the muscle See Admin Instructions 1 dose now and repeat in 2-6 months Yes OLIVIER Bejarano CNP   pravastatin (PRAVACHOL) 40 MG tablet TAKE ONE TABLET BY MOUTH DAILY Yes OLIVIER Bejarano CNP   isosorbide mononitrate (IMDUR) 30 MG extended release tablet TAKE ONE TABLET BY MOUTH DAILY Yes OLIVIER Bejarano CNP   hydroCHLOROthiazide (HYDRODIURIL) 25 MG tablet TAKE ONE TABLET BY MOUTH DAILY Yes OLIVIER Bejarano CNP   Multiple Vitamin (MULTIVITAMIN ADULT PO) Take 1 tablet by mouth daily Yes Historical Provider, MD   nadolol (CORGARD) 20 MG tablet Take 1 tablet by mouth daily TAKE ONE TABLET BY MOUTH DAILY. Ok to increase to twice daily as needed for palpitations. Yes Fish Lance MD   aspirin 325 MG tablet Take 325 mg by mouth daily Yes Historical Provider, MD   niacin 500 MG tablet Take 500 mg by mouth daily.  Yes Historical Provider, MD Meek (Including outside providers/suppliers regularly involved in providing care):   Patient Care Team:  OLIVIER Bejarano CNP as PCP - General (Nurse Practitioner)  OLIVIER Bejarano CNP as PCP - [facility] Provider  Minerva Diaz MD as Consulting Physician (Radiation Oncology)  Avis Manzano MD as Consulting Physician (Pulmonology)  Jamar Oconnor MD as Consulting Physician (Cardiology)  OLIVIER Oviedo CNP as Nurse Practitioner [facility] Nurse Practitioner)     Reviewed and updated this visit:  Tobacco  Allergies  Meds  Problems  Med Hx  Surg Hx  Soc Hx  Fam Hx             Advance Care Planning   Advanced Care Planning: Discussed the patients choices for care and treatment in case of a health event that adversely affects decision-making abilities. Also discussed the patients long-term treatment options. Reviewed with the patient the appropriate state-specific advance directive documents. Reviewed the process of designating a competent adult as an Agent (or -in-fact) that could take make health care decisions for the patient if incompetent. Patient was asked to complete the declaration forms, either acknowledge the forms by a public notary or an eligible witness and provide a signed copy to the practice office.   Time spent (minutes): 3

## 2022-08-11 NOTE — ACP (ADVANCE CARE PLANNING)
Advance Care Planning     Advance Care Planning (ACP) Physician/NP/PA Conversation    Date of Conversation: 8/11/2022  Conducted with: Patient with Decision Making Capacity    Healthcare Decision Maker:      Primary Decision Maker: Nima Odom - Melissa - [phone]    Secondary Decision Maker: Cyndie Garcia - Brother/Sister - [phone]    Click here to complete Healthcare Decision Makers including selection of the Healthcare Decision Maker Relationship (ie \"Primary\")  Today we documented Decision Maker(s) consistent with Legal Next of Kin hierarchy. Care Preferences:    Hospitalization: \"If your health worsens and it becomes clear that your chance of recovery is unlikely, what would be your preference regarding hospitalization? \"  The patient would prefer hospitalization. Ventilation: \"If you were unable to breath on your own and your chance of recovery was unlikely, what would be your preference about the use of a ventilator (breathing machine) if it was available to you? \"  The patient would desire the use of a ventilator. Resuscitation: \"In the event your heart stopped as a result of an underlying serious health condition, would you want attempts made to restart your heart, or would you prefer a natural death? \"  Yes, attempt to resuscitate.     treatment goals, benefit/burden of treatment options, artificial nutrition, ventilation preferences, hospitalization preferences, resuscitation preferences, end of life care preferences (vegetative state/imminent death), and hospice care    Conversation Outcomes / Follow-Up Plan:  ACP in process - information provided, considering goals and options  Reviewed DNR/DNI and patient elects Full Code (Attempt Resuscitation)    Length of Voluntary ACP Conversation in minutes:  <16 minutes (Non-Billable)    OLIVIER Best - CNP

## 2022-08-12 LAB
ESTIMATED AVERAGE GLUCOSE: 137 MG/DL
HBA1C MFR BLD: 6.4 %

## 2022-08-12 NOTE — RESULT ENCOUNTER NOTE
Kidney function is normal.  Diabetes control is slightly better. Still no need for medication. Monitor for now     Please call if you have additional questions and/ or concerns. Please keep your next appt. Thank you.

## 2022-11-18 ENCOUNTER — OFFICE VISIT (OUTPATIENT)
Dept: FAMILY MEDICINE CLINIC | Age: 67
End: 2022-11-18
Payer: MEDICARE

## 2022-11-18 VITALS
HEART RATE: 53 BPM | OXYGEN SATURATION: 96 % | WEIGHT: 140 LBS | SYSTOLIC BLOOD PRESSURE: 122 MMHG | DIASTOLIC BLOOD PRESSURE: 70 MMHG | TEMPERATURE: 97.6 F | BODY MASS INDEX: 23.3 KG/M2

## 2022-11-18 DIAGNOSIS — J40 BRONCHITIS: Primary | ICD-10-CM

## 2022-11-18 PROCEDURE — G8427 DOCREV CUR MEDS BY ELIG CLIN: HCPCS | Performed by: NURSE PRACTITIONER

## 2022-11-18 PROCEDURE — 99213 OFFICE O/P EST LOW 20 MIN: CPT | Performed by: NURSE PRACTITIONER

## 2022-11-18 PROCEDURE — G8420 CALC BMI NORM PARAMETERS: HCPCS | Performed by: NURSE PRACTITIONER

## 2022-11-18 PROCEDURE — G8484 FLU IMMUNIZE NO ADMIN: HCPCS | Performed by: NURSE PRACTITIONER

## 2022-11-18 PROCEDURE — 1123F ACP DISCUSS/DSCN MKR DOCD: CPT | Performed by: NURSE PRACTITIONER

## 2022-11-18 PROCEDURE — 3078F DIAST BP <80 MM HG: CPT | Performed by: NURSE PRACTITIONER

## 2022-11-18 PROCEDURE — 3017F COLORECTAL CA SCREEN DOC REV: CPT | Performed by: NURSE PRACTITIONER

## 2022-11-18 PROCEDURE — 3074F SYST BP LT 130 MM HG: CPT | Performed by: NURSE PRACTITIONER

## 2022-11-18 PROCEDURE — 4004F PT TOBACCO SCREEN RCVD TLK: CPT | Performed by: NURSE PRACTITIONER

## 2022-11-18 RX ORDER — METHYLPREDNISOLONE 4 MG/1
TABLET ORAL
Qty: 1 KIT | Refills: 0 | Status: SHIPPED | OUTPATIENT
Start: 2022-11-18 | End: 2022-11-24

## 2022-11-18 RX ORDER — GUAIFENESIN 600 MG/1
600 TABLET, EXTENDED RELEASE ORAL 2 TIMES DAILY
Qty: 30 TABLET | Refills: 0 | Status: SHIPPED | OUTPATIENT
Start: 2022-11-18 | End: 2022-12-03

## 2022-11-18 ASSESSMENT — ENCOUNTER SYMPTOMS
RHINORRHEA: 0
HEMOPTYSIS: 0
SHORTNESS OF BREATH: 1
HEARTBURN: 0
SORE THROAT: 0
WHEEZING: 0
COUGH: 1

## 2022-11-18 ASSESSMENT — PATIENT HEALTH QUESTIONNAIRE - PHQ9
SUM OF ALL RESPONSES TO PHQ QUESTIONS 1-9: 0
1. LITTLE INTEREST OR PLEASURE IN DOING THINGS: 0
SUM OF ALL RESPONSES TO PHQ9 QUESTIONS 1 & 2: 0
SUM OF ALL RESPONSES TO PHQ QUESTIONS 1-9: 0
2. FEELING DOWN, DEPRESSED OR HOPELESS: 0

## 2022-11-18 NOTE — PROGRESS NOTES
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 male, presents today for:      ASSESSMENT/PLAN:  1. Bronchitis  Start medrol Dose randi for likely allergic reaction to soy bean dust given very dry fall. If no improvement consider Azithromycin/ CXR. Encouraged OTC cough/ cold medication, Tylenol/ Ibuprofen PRN pain  Discussed non pharmacologic measures: Encouraged frequent water intake. Encouraged Gatorade/ Powerade if not drinking fluids, intake of honey/ lemon tea for sore throat, Humidifier at night for cough, hot showers, Netti Pot if needed for congestion. If no improvement, encouraged pt to call for F/U labs/ imaging    - methylPREDNISolone (MEDROL, RANDI,) 4 MG tablet; Take by mouth. Dispense: 1 kit; Refill: 0  - guaiFENesin (MUCINEX) 600 MG extended release tablet; Take 1 tablet by mouth 2 times daily for 15 days  Dispense: 30 tablet; Refill: 0      No follow-ups on file. Cough  This is a new problem. Episode onset: 2 weeks. The problem has been gradually worsening. The problem occurs every few minutes. The cough is Productive of sputum (clear). Associated symptoms include shortness of breath (intermittent). Pertinent negatives include no chest pain, chills, ear congestion, ear pain, fever, headaches, heartburn, hemoptysis, myalgias, nasal congestion, postnasal drip, rash, rhinorrhea, sore throat, sweats, weight loss or wheezing. The symptoms are aggravated by dust. Risk factors for lung disease include smoking/tobacco exposure. He has tried OTC cough suppressant for the symptoms. The treatment provided no relief. There is no history of asthma, bronchiectasis, bronchitis, COPD, emphysema, environmental allergies or pneumonia. 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 Has not checked for COVID or received 4th booster.      PHQ Scores 11/18/2022 8/10/2022 2/17/2022 3/2/2021 2/24/2021 9/3/2020 3/22/2019   PHQ2 Score 0 0 0 0 0 0 0   PHQ9 Score 0 0 0 0 0 0 0     Interpretation of Total Score Depression Severity: 1-4 = Minimal depression, 5-9 = Mild depression, 10-14 = Moderate depression, 15-19 = Moderately severe depression, 20-27 = Severe depression    Lab Results   Component Value Date     08/11/2022    K 4.6 08/11/2022    CL 99 08/11/2022    CO2 30 08/11/2022    BUN 21 (H) 08/11/2022    CREATININE 0.8 08/11/2022    GLUCOSE 95 08/11/2022    CALCIUM 9.8 08/11/2022    PROT 7.3 02/17/2022    LABALBU 4.7 02/17/2022    BILITOT 0.4 02/17/2022    ALKPHOS 82 02/17/2022    AST 20 02/17/2022    ALT 29 02/17/2022    LABGLOM >60 08/11/2022    GFRAA >60 08/11/2022    AGRATIO 1.8 02/17/2022    GLOB 2.4 02/23/2021         Review of Systems   Constitutional:  Negative for chills, fever and weight loss. HENT:  Negative for ear pain, postnasal drip, rhinorrhea and sore throat. Respiratory:  Positive for cough and shortness of breath (intermittent). Negative for hemoptysis and wheezing. Cardiovascular:  Negative for chest pain. Gastrointestinal:  Negative for heartburn. Musculoskeletal:  Negative for myalgias. Skin:  Negative for rash. Allergic/Immunologic: Negative for environmental allergies. Neurological:  Negative for headaches.      Current Outpatient Medications on File Prior to Visit   Medication Sig Dispense Refill    zoster recombinant adjuvanted vaccine (SHINGRIX) 50 MCG/0.5ML SUSR injection Inject 0.5 mLs into the muscle See Admin Instructions 1 dose now and repeat in 2-6 months 0.5 mL 0    pravastatin (PRAVACHOL) 40 MG tablet TAKE ONE TABLET BY MOUTH DAILY 90 tablet 3    isosorbide mononitrate (IMDUR) 30 MG extended release tablet TAKE ONE TABLET BY MOUTH DAILY 90 tablet 3    hydroCHLOROthiazide (HYDRODIURIL) 25 MG tablet TAKE ONE TABLET BY MOUTH DAILY 90 tablet 3    Multiple Vitamin (MULTIVITAMIN ADULT PO) Take 1 tablet by mouth daily      nadolol (CORGARD) 20 MG tablet Take 1 tablet by mouth daily TAKE ONE TABLET BY MOUTH DAILY. Ok to increase to twice daily as needed for palpitations. 90 tablet 5    aspirin 325 MG tablet Take 325 mg by mouth daily      niacin 500 MG tablet Take 500 mg by mouth daily. No current facility-administered medications on file prior to visit. No Known Allergies  Past Medical History:   Diagnosis Date    Abnormal echocardiogram 04/2019    Hyperlipidemia     Hypertension     Myocardiopathy ([facility] [address] 3/2/2021    [facility] 2012    total prostatectomy,[facility]    Sleep apnea     Smoker      Past Surgical History:   Procedure Laterality Date    CARDIAC CATHETERIZATION  04/23/2019    Non Obs CAD, medical management    COLONOSCOPY  3/22/12    EYE SURGERY  1989    eye trauma os with fixed pupil;vision is ok;    PROSTATE BIOPSY      PROSTATECTOMY  02/14/2012     Social History     Tobacco Use    Smoking status: Every Day     Packs/day: 0.50     Years: 30.00     Pack years: 15.00     Types: Cigarettes    Smokeless tobacco: Current   Substance Use Topics    Alcohol use: Yes     Alcohol/week: 0.0 standard drinks     Comment: rare     Family History   Problem Relation Age of Onset    Stroke Father     Heart Disease Father     Diabetes Father        Vitals:    11/18/22 0701   BP: 122/70   Pulse: 53   Temp: 97.6 °F (36.4 °C)   TempSrc: Oral   SpO2: 96%   Weight: 140 lb (63.5 kg)     Estimated body mass index is 23.3 kg/m² as calculated from the following:    Height as of 1/6/22: 5' 5\" (1.651 m). Weight as of this encounter: 140 lb (63.5 kg). Physical Exam  Vitals reviewed. Constitutional:       Appearance: Normal appearance. HENT:      Head: Normocephalic.       Right Ear: Tympanic membrane, ear canal and external ear normal.      Left Ear: Tympanic membrane, ear canal and external ear normal.      Nose: Nose normal.      Mouth/Throat:      Mouth: Mucous membranes are moist.   Neck:      Vascular: No carotid bruit. Cardiovascular:      Rate and Rhythm: Normal rate and regular rhythm. Pulses: Normal pulses. Carotid pulses are 2+ on the right side and 2+ on the left side. Dorsalis pedis pulses are 2+ on the right side and 2+ on the left side. Posterior tibial pulses are 2+ on the right side and 2+ on the left side. Heart sounds: Normal heart sounds. No murmur heard. No gallop. Pulmonary:      Effort: Pulmonary effort is normal.      Breath sounds: Normal breath sounds. Abdominal:      General: Abdomen is flat. Palpations: Abdomen is soft. Musculoskeletal:         General: Normal range of motion. Cervical back: Normal range of motion. Right lower leg: No edema. Left lower leg: No edema. Lymphadenopathy:      Cervical: No cervical adenopathy. Skin:     General: Skin is warm and dry. Capillary Refill: Capillary refill takes less than 2 seconds. Neurological:      General: No focal deficit present. Mental Status: He is alert and oriented to person, place, and time. Psychiatric:         Mood and Affect: Mood normal.         Behavior: Behavior normal.         Patient's questions answered and concerns addressed. Patient agrees to plan of care.         Electronically signed by OLIVIER Gallardo CNP on 11/18/2022 at 7:15 AM within normal limits. There is no evidence of fracture or malalignment. No radiopaque foreign body noted. No acute findings. Ct Head Wo Contrast    Result Date: 10/26/2019  Patient MRN:  [id number] : 1970 Age: 52 years Gender: Female Order Date:  10/26/2019 10:30 PM TECHNIQUE/NUMBER OF IMAGES/COMPARISON/CLINICAL HISTORY: CT brain Axial images were obtained sagittal and coronal reconstructions History seizures and fall. 445 images Comparison study of . FINDINGS: There is no indication for an acute intracranial hemorrhagic event. There is no indication for a sizable effusion acute or recent insult to the brain parenchyma. There is a left medial cranial fossa arachnoid cyst which is an old finding likely on developmental bases. There is no focal mass defect or midline shift. Images with bone window settings demonstrate no significant findings. Midline structures have unremarkable appearance. No interval change since the previous study of . No indication for an acute intracranial process. Ct Head Wo Contrast    Result Date: 10/24/2019  Location: 200 Exam: CT HEAD WO CONTRAST Comparison: 2019. History: Seizure. Technique: Serial axial images of the brain were obtained without the use of intravenous contrast.  Reformatted sagittal and coronal images were obtained. Automated dose exposure control was utilized for this examination. Findings: Without contrast, slices were scanned from the base of the skull to the vertex and show the ventricles to be normal in size, shape and configuration. The midline structures are midline. The subarachnoid spaces and sulci are also normal in size and configuration. No abnormal parenchymal densities are identified and no abnormal calcifications are seen. There is a stable CSF attenuated arachnoid cyst seen within the left middle cranial fossa with mass effect upon the anterior aspect of the left temporal lobe. There is no hydrocephalus.      1. There is no acute intracranial abnormality. 2. Stable arachnoid cyst within the left middle cranial fossa with mild mass effect upon the anterior aspect of the left temporal lobe. Ct Cervical Spine Wo Contrast    Result Date: 10/26/2019  Patient MRN:  [id number] : 1970 Age: 52 years Gender: Female Order Date:  10/26/2019 10:30 PM TECHNIQUE/NUMBER OF IMAGES/COMPARISON/CLINICAL HISTORY: CT cervical Axial with sagittal coronal and oblique reconstructions 51-year-old female patient is seizure trauma injury. Comparison study . FINDINGS: Vertebral bodies have normal height. Disc spaces are well-maintained. Alignment is preserved in the sagittal and coronal images are The odontoid process intact. Articular relation between the occipital condyles and C1 and between C1 and C2 are preserved. All facet joints are well aligned the. There is no bone or soft tissue encroachment of the cervical spine canal or neural foramina. There are intact appearance for pedicles, spinous process and transverse process. All facet joints are well aligned. Patient has a orotracheal tube and orogastric tube. No acute fractures are seen in the cervical spine. Lung apices demonstrate a lateral pleural effusions. No acute fractures or dislocations identified in the cervical spine. Ct Cervical Spine Wo Contrast    Result Date: 10/24/2019  LOCATION: 200 EXAM: CT CERVICAL SPINE WO CONTRAST COMPARISON: None. HISTORY: Pain TECHNIQUE: Noncontrast helical CT images were performed of the cervical spine. Reformatted coronal and sagittal images were obtained. Automated dose exposure control was utilized for this examination. CONTRAST: No intravenous contrast administered. FINDINGS: There is normal alignment of the cervical spine. The vertebral bodies are normal in height and alignment. There is no evidence of fracture or dislocation. 1.No fracture or malalignment.     Xr Chest Portable    Result Date: 10/26/2019  Patient MRN: for pulmonary embolus. No filling defects identified to the subsegmental level. HEART/PERICARDIUM/GREAT VESSELS: Cardiac size is normal.  Small pericardial effusion is noted. The great vessels of the chest are normal in caliber. LYMPH NODES: No thoracic adenopathy by size criteria. NECK BASE/CHEST WALL/DIAPHRAGM: No soft tissue lesions or diaphragmatic abnormality. UPPER ABDOMEN: Limited images through the upper abdomen are unremarkable. OSSEOUS STRUCTURES: No suspicious lytic or blastic lesions. No fractures. 1. No evidence of pulmonary embolism. 2. Small pericardial effusion. 3. 6 mm nodule in the right upper lobe is likely a perifissural nodule. Recommend follow-up. Nm Cardiac Stress Test Nuclear Imaging    Result Date: 10/26/2019  Patient MRN:  [id number] : 1970 Age: 52 years Gender: Female Order Date:  10/26/2019 10:00 AM EXAM: NM MYOCARDIAL SPECT REST EXERCISE OR RX Number of Images:  9   views INDICATION:  Chest pain Reason for Exam?->Chest pain Procedure Type->Rx COMPARISON: None TECHNIQUE: 10.6 mCi of Tc-99m MIBI was injected intravenously at rest and cardiac SPECT images were performed. In addition 33 mCi of Tc-99m MIBI was injected intravenously at maximum stress by using Lexiscan stress. Stress SPECT images and gated study were performed. FINDINGS: Perfusion images demonstrate small anterior wall reversible defect Wall motion is within normal limits. The end diastolic volume is 54 ml. The end systolic volume is 17 ml. The estimated ejection fraction is 69 %. 1. There is a small reversible defect in the inferior wall 2. Ejection fraction is 69 %. 3. No significant wall motion abnormality ALERT:  THIS IS AN ABNORMAL REPORT     Resident's Assessment and Gaurav Justice is a 52 y.o. female was tranfered to the MICU for status epilepticus and failure to protect airway    Neurologic  1.   Status epilepticus 2/2 noncompliance   Unwitnessed fall with loss of consciousness and seizure today   Patient admitted to MICU for further care and evaluation   Neurology on board   Continue Keppra 1.5 mg twice daily   Continue propofol drip   Continue Versed drip   Continue as needed Ativan for agitation   CT head showed no acute intracranial bleeds or processes   Follow up EEG   Started on Vimpat  as per neurology today    2. Unwitnessed fall with LOC  · CT cervical spine was negative for acute fractures or intra-spinal processes  · CT head without contrast was negative for acute intracranial events    Cardiology  1. Possible myocardial infarction  · Told that patient initially complained of chest pain  · Neurological stress test was negative for chest pain and EKG changes  · Lexiscan showed reversible inferior wall defect, EF 65% with no wall motion abnormalities  2. H/o HTN  · Continue home losartan  3. H/o lipidemia  · Continue home pravastatin    Pulmonary  1. Acute respiratory failure 2/2 inability to protect airway 2/2 status epilepticus   ABG shows pH 7.369 PCO2 48.1 PO2 135 bicarb 27.1   Patient unable to protect airway after 3 doses of 2 mg of Ativan   Patient was intubated   Currently sedated on propofol and Versed   Will transition to sedation on propofol and fentanyl today     Infectious disease  UTI? · UA shows positive nitrates small leukocyte esterase 5-10 WBCs many bacteria  · Culture positive for E. coli  · Hold Cipro  · History of ESBL  · Awaiting sensitivities  · Afebrile without leucocytosis  · Antibiotics not indicated at this time   · Hematology/Oncology  1. Anemia   Initial hemoglobin 11.8   Currently 9.9   We will continue to monitor CBC   No clear source of bleeding    Dermatologic/Musculoskeletal   1.   Multiple abrasions on the right leg   Patient states that they damaged her leg on previous seizures    DVT prophylaxis/ GI prophylaxis: Enoxaparin/PPI  Disposition: MICU    Concepcion Shi MD, PGY-1   Attending physician: Dr. Esther Barron    I personally saw,

## 2022-12-13 ENCOUNTER — TELEPHONE (OUTPATIENT)
Dept: FAMILY MEDICINE CLINIC | Age: 67
End: 2022-12-13

## 2022-12-13 NOTE — TELEPHONE ENCOUNTER
Patient says he feels fine and can wait unless there is an opening available would like to be seen.  Moved his appointment to 12/15/22

## 2022-12-13 NOTE — TELEPHONE ENCOUNTER
Patient says he has shortness of breath and congestion. Other than the shortness of breath he feels fine. He has an appointment scheduled for 12/16/22.  Asked him if he has tested for covid, says he has not, he will do a home test and call back with result

## 2022-12-15 ENCOUNTER — OFFICE VISIT (OUTPATIENT)
Dept: FAMILY MEDICINE CLINIC | Age: 67
End: 2022-12-15
Payer: MEDICARE

## 2022-12-15 ENCOUNTER — HOSPITAL ENCOUNTER (OUTPATIENT)
Age: 67
Discharge: HOME | End: 2022-12-15
Payer: MEDICARE

## 2022-12-15 ENCOUNTER — HOSPITAL ENCOUNTER (OUTPATIENT)
Dept: GENERAL RADIOLOGY | Age: 67
Discharge: HOME | End: 2022-12-15
Payer: MEDICARE

## 2022-12-15 VITALS
OXYGEN SATURATION: 97 % | HEART RATE: 62 BPM | WEIGHT: 144 LBS | BODY MASS INDEX: 23.96 KG/M2 | DIASTOLIC BLOOD PRESSURE: 70 MMHG | SYSTOLIC BLOOD PRESSURE: 110 MMHG

## 2022-12-15 DIAGNOSIS — R05.2 SUBACUTE COUGH: ICD-10-CM

## 2022-12-15 DIAGNOSIS — R05.2 SUBACUTE COUGH: Primary | ICD-10-CM

## 2022-12-15 DIAGNOSIS — I50.22 CHRONIC SYSTOLIC (CONGESTIVE) HEART FAILURE (HCC): ICD-10-CM

## 2022-12-15 DIAGNOSIS — I49.3 PVC (PREMATURE VENTRICULAR CONTRACTION): ICD-10-CM

## 2022-12-15 DIAGNOSIS — I10 ESSENTIAL HYPERTENSION: ICD-10-CM

## 2022-12-15 DIAGNOSIS — E78.5 HYPERLIPIDEMIA LDL GOAL <130: ICD-10-CM

## 2022-12-15 DIAGNOSIS — Z23 FLU VACCINE NEED: ICD-10-CM

## 2022-12-15 PROCEDURE — 99213 OFFICE O/P EST LOW 20 MIN: CPT | Performed by: NURSE PRACTITIONER

## 2022-12-15 PROCEDURE — G8420 CALC BMI NORM PARAMETERS: HCPCS | Performed by: NURSE PRACTITIONER

## 2022-12-15 PROCEDURE — 3074F SYST BP LT 130 MM HG: CPT | Performed by: NURSE PRACTITIONER

## 2022-12-15 PROCEDURE — 1123F ACP DISCUSS/DSCN MKR DOCD: CPT | Performed by: NURSE PRACTITIONER

## 2022-12-15 PROCEDURE — G8484 FLU IMMUNIZE NO ADMIN: HCPCS | Performed by: NURSE PRACTITIONER

## 2022-12-15 PROCEDURE — 90694 VACC AIIV4 NO PRSRV 0.5ML IM: CPT | Performed by: NURSE PRACTITIONER

## 2022-12-15 PROCEDURE — 3017F COLORECTAL CA SCREEN DOC REV: CPT | Performed by: NURSE PRACTITIONER

## 2022-12-15 PROCEDURE — 4004F PT TOBACCO SCREEN RCVD TLK: CPT | Performed by: NURSE PRACTITIONER

## 2022-12-15 PROCEDURE — G8427 DOCREV CUR MEDS BY ELIG CLIN: HCPCS | Performed by: NURSE PRACTITIONER

## 2022-12-15 PROCEDURE — 36415 COLL VENOUS BLD VENIPUNCTURE: CPT | Performed by: NURSE PRACTITIONER

## 2022-12-15 PROCEDURE — G0008 ADMIN INFLUENZA VIRUS VAC: HCPCS | Performed by: NURSE PRACTITIONER

## 2022-12-15 PROCEDURE — 71046 X-RAY EXAM CHEST 2 VIEWS: CPT

## 2022-12-15 PROCEDURE — 3078F DIAST BP <80 MM HG: CPT | Performed by: NURSE PRACTITIONER

## 2022-12-15 RX ORDER — ISOSORBIDE MONONITRATE 30 MG/1
TABLET, EXTENDED RELEASE ORAL
Qty: 90 TABLET | Refills: 3 | Status: SHIPPED | OUTPATIENT
Start: 2022-12-15

## 2022-12-15 RX ORDER — DOXYCYCLINE HYCLATE 100 MG
100 TABLET ORAL 2 TIMES DAILY
Qty: 10 TABLET | Refills: 0 | Status: SHIPPED | OUTPATIENT
Start: 2022-12-15 | End: 2022-12-20

## 2022-12-15 RX ORDER — HYDROCHLOROTHIAZIDE 25 MG/1
TABLET ORAL
Qty: 90 TABLET | Refills: 3 | Status: SHIPPED | OUTPATIENT
Start: 2022-12-15

## 2022-12-15 RX ORDER — PRAVASTATIN SODIUM 40 MG
TABLET ORAL
Qty: 90 TABLET | Refills: 3 | Status: SHIPPED | OUTPATIENT
Start: 2022-12-15

## 2022-12-15 RX ORDER — NADOLOL 20 MG/1
20 TABLET ORAL DAILY
Qty: 90 TABLET | Refills: 5 | Status: SHIPPED | OUTPATIENT
Start: 2022-12-15

## 2022-12-15 NOTE — PROGRESS NOTES
12/15/2022    Chief Complaint   Patient presents with    Hypertension    Cough     On going        Syliva January is a 79 y.o. male, presents today for:      ASSESSMENT/PLAN:    1. Subacute cough  Due to unresolving symptoms, start Doxycycline. CXR and labs ordered to rule out pneumonia  Encouraged OTC cough/ cold medication, Tylenol/ Ibuprofen PRN pain  Discussed non pharmacologic measures: Encouraged frequent water intake. Encouraged Gatorade/ Powerade if not drinking fluids, intake of honey/ lemon tea for sore throat, Humidifier at night for cough, hot showers, Netti Pot if needed for congestion. If no improvement, encouraged pt to call for F/U labs/ imaging    - doxycycline hyclate (VIBRA-TABS) 100 MG tablet; Take 1 tablet by mouth 2 times daily for 5 days  Dispense: 10 tablet; Refill: 0  - CBC with Auto Differential  - Comprehensive Metabolic Panel  - XR CHEST (2 VW); Future  - Brain Natriuretic Peptide    2. Chronic systolic (congestive) heart failure  Stable today  Due to unresolving cough BNP ordered. Appears euvolemic on exam  Continue current medications   - Brain Natriuretic Peptide    3. Essential hypertension  Stable today  Continue current medications. Continue care with Cardio  - nadolol (CORGARD) 20 MG tablet; Take 1 tablet by mouth daily TAKE ONE TABLET BY MOUTH DAILY. Ok to increase to twice daily as needed for palpitations. Dispense: 90 tablet; Refill: 5  - hydroCHLOROthiazide (HYDRODIURIL) 25 MG tablet; TAKE ONE TABLET BY MOUTH DAILY  Dispense: 90 tablet; Refill: 3    4. Hyperlipidemia LDL goal <130  Stable today  Continue Pravastatin  - pravastatin (PRAVACHOL) 40 MG tablet; TAKE ONE TABLET BY MOUTH DAILY  Dispense: 90 tablet; Refill: 3    5. PVC (premature ventricular contraction)  See above  - nadolol (CORGARD) 20 MG tablet; Take 1 tablet by mouth daily TAKE ONE TABLET BY MOUTH DAILY. Ok to increase to twice daily as needed for palpitations. Dispense: 90 tablet;  Refill: 5  - isosorbide mononitrate (IMDUR) 30 MG extended release tablet; TAKE ONE TABLET BY MOUTH DAILY  Dispense: 90 tablet; Refill: 3    6. Flu vaccine need  Influenza vaccination given today. - Influenza, FLUAD, (age 72 y+), IM, Preservative Free, 0.5 mL    Return for keep Feb.    Presenting today for acute concern of a cough. Also needing medication refills. Cough  This is a new problem. Episode onset: 2 weeks. The problem has been gradually worsening. The problem occurs every few minutes. The cough is Productive of sputum (clear). Associated symptoms include shortness of breath (intermittent). Pertinent negatives include no chest pain, chills, ear congestion, ear pain, fever, headaches, heartburn, hemoptysis, myalgias, nasal congestion, postnasal drip, rash, rhinorrhea, sore throat, sweats, weight loss or wheezing. The symptoms are aggravated by dust. Risk factors for lung disease include smoking/tobacco exposure. He has tried OTC cough suppressant for the symptoms. The treatment provided no relief. There is no history of asthma, bronchiectasis, bronchitis, COPD, emphysema, environmental allergies or pneumonia. 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 Has not checked for COVID or received 4th booster. CAD/ Bradycardia with PVCs/ HTN: Continuing to follow with Cardiology. No CP, leg swelling. Nadolol dose recently decreased by Cardio but patient has not started yet. 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 Does not need refills. Continuing to smoke 1/2ppd, not interested in quitting. HLD: Tolerating Atorvastatin well. No myalgias, leg pain, SOB. ARGELIA: Tolerating CPAP well, using every night.   Recent OV with Pulm    Lab Results   Component Value Date     12/15/2022    K 3.9 12/15/2022     12/15/2022    CO2 25 12/15/2022    BUN 16 12/15/2022    CREATININE 0.8 12/15/2022    GLUCOSE 85 12/15/2022    CALCIUM 9.8 12/15/2022    PROT 7.1 12/15/2022    LABALBU 4.5 12/15/2022    BILITOT 0.3 12/15/2022    ALKPHOS 96 12/15/2022    AST 18 12/15/2022    ALT 22 12/15/2022    LABGLOM >60 12/15/2022    GFRAA >60 08/11/2022    AGRATIO 1.7 12/15/2022    GLOB 2.4 02/23/2021         Review of Systems   Constitutional:  Negative for chills and fever. HENT:  Negative for ear pain, postnasal drip, rhinorrhea and sore throat. Respiratory:  Positive for cough and shortness of breath (intermittent). Negative for wheezing. Cardiovascular:  Negative for chest pain. Musculoskeletal:  Negative for myalgias. Skin:  Negative for rash. Allergic/Immunologic: Negative for environmental allergies. Neurological:  Negative for headaches. Current Outpatient Medications on File Prior to Visit   Medication Sig Dispense Refill    Multiple Vitamin (MULTIVITAMIN ADULT PO) Take 1 tablet by mouth daily      aspirin 325 MG tablet Take 325 mg by mouth daily      niacin 500 MG tablet Take 500 mg by mouth daily. No current facility-administered medications on file prior to visit. No Known Allergies  Past Medical History:   Diagnosis Date    Abnormal echocardiogram 04/2019    Hyperlipidemia     Hypertension     Myocardiopathy ([facility] [address] 3/2/2021    Prostate cancer ([facility] [address] 2012    total prostatectomy,[facility]    Sleep apnea     Smoker      Past Surgical History:   Procedure Laterality Date    CARDIAC CATHETERIZATION  04/23/2019    Non Obs CAD, medical management    COLONOSCOPY  3/22/12    EYE SURGERY  1989    eye trauma os with fixed pupil;vision is ok;    PROSTATE BIOPSY      PROSTATECTOMY  02/14/2012     Social History     Tobacco Use    Smoking status: Every Day     Packs/day: 0.50     Years: 30.00     Pack years: 15.00     Types: Cigarettes    Smokeless tobacco: Current   Substance Use Topics    Alcohol use:  Yes     Alcohol/week: 0.0 standard drinks     Comment: rare     Family History   Problem Relation Age of Onset    Stroke Father     Heart Disease Father     Diabetes Father Vitals:    12/15/22 1129   BP: 110/70   Site: Left Upper Arm   Position: Sitting   Cuff Size: Medium Adult   Pulse: 62   SpO2: 97%   Weight: 144 lb (65.3 kg)     Estimated body mass index is 23.96 kg/m² as calculated from the following:    Height as of 1/6/22: 5' 5\" (1.651 m). Weight as of this encounter: 144 lb (65.3 kg). Physical Exam  Vitals reviewed. Constitutional:       Appearance: Normal appearance. HENT:      Head: Normocephalic. Right Ear: Tympanic membrane, ear canal and external ear normal.      Left Ear: Tympanic membrane, ear canal and external ear normal.      Nose: Nose normal.      Mouth/Throat:      Mouth: Mucous membranes are moist.   Neck:      Vascular: No carotid bruit. Cardiovascular:      Rate and Rhythm: Normal rate and regular rhythm. Pulses: Normal pulses. Carotid pulses are 2+ on the right side and 2+ on the left side. Dorsalis pedis pulses are 2+ on the right side and 2+ on the left side. Posterior tibial pulses are 2+ on the right side and 2+ on the left side. Heart sounds: Normal heart sounds. No murmur heard. No gallop. Pulmonary:      Effort: Pulmonary effort is normal.      Breath sounds: Normal breath sounds. Abdominal:      General: Abdomen is flat. Palpations: Abdomen is soft. Musculoskeletal:         General: Normal range of motion. Cervical back: Normal range of motion. Right lower leg: No edema. Left lower leg: No edema. Lymphadenopathy:      Cervical: No cervical adenopathy. Skin:     General: Skin is warm and dry. Capillary Refill: Capillary refill takes less than 2 seconds. Neurological:      General: No focal deficit present. Mental Status: He is alert and oriented to person, place, and time. Psychiatric:         Mood and Affect: Mood normal.         Behavior: Behavior normal.         Patient's questions answered and concerns addressed.  Patient agrees to plan of care.        Electronically signed by OLIVIER Lemus CNP on 12/29/2022 at 9:14 AM

## 2022-12-16 LAB
A/G RATIO: 1.7 (ref 1.1–2.2)
ALBUMIN SERPL-MCNC: 4.5 G/DL (ref 3.4–5)
ALP BLD-CCNC: 96 U/L (ref 40–129)
ALT SERPL-CCNC: 22 U/L (ref 10–40)
ANION GAP SERPL CALCULATED.3IONS-SCNC: 16 MMOL/L (ref 3–16)
AST SERPL-CCNC: 18 U/L (ref 15–37)
BASOPHILS ABSOLUTE: 0 K/UL (ref 0–0.2)
BASOPHILS RELATIVE PERCENT: 0.5 %
BILIRUB SERPL-MCNC: 0.3 MG/DL (ref 0–1)
BUN BLDV-MCNC: 16 MG/DL (ref 7–20)
CALCIUM SERPL-MCNC: 9.8 MG/DL (ref 8.3–10.6)
CHLORIDE BLD-SCNC: 100 MMOL/L (ref 99–110)
CO2: 25 MMOL/L (ref 21–32)
CREAT SERPL-MCNC: 0.8 MG/DL (ref 0.8–1.3)
EOSINOPHILS ABSOLUTE: 0.2 K/UL (ref 0–0.6)
EOSINOPHILS RELATIVE PERCENT: 3 %
GFR SERPL CREATININE-BSD FRML MDRD: >60 ML/MIN/{1.73_M2}
GLUCOSE BLD-MCNC: 85 MG/DL (ref 70–99)
HCT VFR BLD CALC: 46.6 % (ref 40.5–52.5)
HEMOGLOBIN: 15.4 G/DL (ref 13.5–17.5)
LYMPHOCYTES ABSOLUTE: 1.9 K/UL (ref 1–5.1)
LYMPHOCYTES RELATIVE PERCENT: 25.5 %
MCH RBC QN AUTO: 31 PG (ref 26–34)
MCHC RBC AUTO-ENTMCNC: 33.1 G/DL (ref 31–36)
MCV RBC AUTO: 93.5 FL (ref 80–100)
MONOCYTES ABSOLUTE: 0.6 K/UL (ref 0–1.3)
MONOCYTES RELATIVE PERCENT: 8.3 %
NEUTROPHILS ABSOLUTE: 4.6 K/UL (ref 1.7–7.7)
NEUTROPHILS RELATIVE PERCENT: 62.7 %
PDW BLD-RTO: 13.8 % (ref 12.4–15.4)
PLATELET # BLD: 216 K/UL (ref 135–450)
PMV BLD AUTO: 9.1 FL (ref 5–10.5)
POTASSIUM SERPL-SCNC: 3.9 MMOL/L (ref 3.5–5.1)
PRO-BNP: <5 PG/ML (ref 0–124)
RBC # BLD: 4.98 M/UL (ref 4.2–5.9)
SODIUM BLD-SCNC: 141 MMOL/L (ref 136–145)
TOTAL PROTEIN: 7.1 G/DL (ref 6.4–8.2)
WBC # BLD: 7.4 K/UL (ref 4–11)

## 2022-12-16 NOTE — RESULT ENCOUNTER NOTE
No clear cause for cough in labs. Normal white cell count (which indicates infection). NO anemia. Normal kidney/ liver function. No extra fluid on your lungs.

## 2022-12-16 NOTE — RESULT ENCOUNTER NOTE
Normal chest Xray. Lets try the medication like we discussed yesterday and see how you feel. Let me know if you don't feel better.

## 2022-12-29 ASSESSMENT — ENCOUNTER SYMPTOMS
WHEEZING: 0
SHORTNESS OF BREATH: 1
COUGH: 1
SORE THROAT: 0
RHINORRHEA: 0

## 2023-01-27 ENCOUNTER — TELEMEDICINE (OUTPATIENT)
Dept: PULMONOLOGY | Age: 68
End: 2023-01-27
Payer: MEDICARE

## 2023-01-27 DIAGNOSIS — Z71.89 CPAP USE COUNSELING: ICD-10-CM

## 2023-01-27 DIAGNOSIS — R53.83 OTHER FATIGUE: ICD-10-CM

## 2023-01-27 DIAGNOSIS — I10 ESSENTIAL HYPERTENSION: ICD-10-CM

## 2023-01-27 DIAGNOSIS — G47.33 OSA (OBSTRUCTIVE SLEEP APNEA): Primary | ICD-10-CM

## 2023-01-27 PROCEDURE — 3017F COLORECTAL CA SCREEN DOC REV: CPT | Performed by: NURSE PRACTITIONER

## 2023-01-27 PROCEDURE — 99213 OFFICE O/P EST LOW 20 MIN: CPT | Performed by: NURSE PRACTITIONER

## 2023-01-27 PROCEDURE — G8427 DOCREV CUR MEDS BY ELIG CLIN: HCPCS | Performed by: NURSE PRACTITIONER

## 2023-01-27 PROCEDURE — 1123F ACP DISCUSS/DSCN MKR DOCD: CPT | Performed by: NURSE PRACTITIONER

## 2023-01-27 ASSESSMENT — SLEEP AND FATIGUE QUESTIONNAIRES
HOW LIKELY ARE YOU TO NOD OFF OR FALL ASLEEP WHILE LYING DOWN TO REST IN THE AFTERNOON WHEN CIRCUMSTANCES PERMIT: 2
HOW LIKELY ARE YOU TO NOD OFF OR FALL ASLEEP WHILE SITTING AND TALKING TO SOMEONE: 0
HOW LIKELY ARE YOU TO NOD OFF OR FALL ASLEEP WHILE SITTING INACTIVE IN A PUBLIC PLACE: 1
ESS TOTAL SCORE: 6
HOW LIKELY ARE YOU TO NOD OFF OR FALL ASLEEP WHEN YOU ARE A PASSENGER IN A CAR FOR AN HOUR WITHOUT A BREAK: 0
HOW LIKELY ARE YOU TO NOD OFF OR FALL ASLEEP WHILE SITTING QUIETLY AFTER LUNCH WITHOUT ALCOHOL: 1
HOW LIKELY ARE YOU TO NOD OFF OR FALL ASLEEP WHILE WATCHING TV: 0
HOW LIKELY ARE YOU TO NOD OFF OR FALL ASLEEP IN A CAR, WHILE STOPPED FOR A FEW MINUTES IN TRAFFIC: 0
HOW LIKELY ARE YOU TO NOD OFF OR FALL ASLEEP WHILE SITTING AND READING: 2

## 2023-01-27 NOTE — PROGRESS NOTES
Patient ID: Elicia Dias is a 79 y.o. male who is being seen today for   Chief Complaint   Patient presents with    Sleep Apnea     1 year sleep          HPI:     Elicia Dias is a 79 y.o. male for televideo appointment via video and audio virtual visit for ARGELIA follow up. States he is doing well with CPAP. Patient is using CPAP   6-8 hrs/night. Using humidifier. No snoring on CPAP. The pressure is well tolerated. The mask is comfortable- nasal pillows with chin strap. No mask leak. No significant daytime sleepiness. No nodding off when driving. No dry nose or throat. Some fatigue. Bedtime is 9 pm and rise time is 3-4 am. Sleep onset is few minutes. Wakes up 0-1 times at night total. No nocturia. It takes few unknown minutes to fall back a sleep. Occasional naps during the day. No headache in am. No weight gain. 6-7 caffienated beverages during the day. No alcohol.  ESS is 7      Sleep Medicine 1/28/2022 1/26/2021 2/11/2020 11/5/2019 6/28/2019   Sitting and reading 1 0 1 0 3   Watching TV 1 0 1 2 2   Sitting, inactive in a public place (e.g. a theatre or a meeting) 2 0 0 0 3   As a passenger in a car for an hour without a break 0 0 0 0 0   Lying down to rest in the afternoon when circumstances permit 2 1 1 3 2   Sitting and talking to someone 0 0 0 0 0   Sitting quietly after a lunch without alcohol 1 0 0 0 1   In a car, while stopped for a few minutes in traffic 0 0 0 0 0   Henefer Sleepiness Score 7 1 3 5 11   Neck circumference (Inches) - - 14.5 14 14       Past Medical History:  Past Medical History:   Diagnosis Date    Abnormal echocardiogram 04/2019    Hyperlipidemia     Hypertension     Myocardiopathy ([address] 3/2/2021    Prostate cancer ([address] 2012    total prostatectomy,DrDilip    Sleep apnea     Smoker        Past Surgical History:        Procedure Laterality Date    CARDIAC CATHETERIZATION  04/23/2019    Non Obs CAD, medical management    COLONOSCOPY  3/22/12    EYE SURGERY  1989    eye trauma os with fixed pupil;vision is ok;    PROSTATE BIOPSY      PROSTATECTOMY  02/14/2012       Allergies:  has No Known Allergies. Social History:    TOBACCO:   reports that he has been smoking cigarettes. He has a 15.00 pack-year smoking history. His smokeless tobacco use includes snuff. ETOH:   reports current alcohol use. Family History:       Problem Relation Age of Onset    Stroke Father     Heart Disease Father     Diabetes Father        Current Medications:    Current Outpatient Medications:     pravastatin (PRAVACHOL) 40 MG tablet, TAKE ONE TABLET BY MOUTH DAILY, Disp: 90 tablet, Rfl: 3    nadolol (CORGARD) 20 MG tablet, Take 1 tablet by mouth daily TAKE ONE TABLET BY MOUTH DAILY. Ok to increase to twice daily as needed for palpitations. , Disp: 90 tablet, Rfl: 5    isosorbide mononitrate (IMDUR) 30 MG extended release tablet, TAKE ONE TABLET BY MOUTH DAILY, Disp: 90 tablet, Rfl: 3    hydroCHLOROthiazide (HYDRODIURIL) 25 MG tablet, TAKE ONE TABLET BY MOUTH DAILY, Disp: 90 tablet, Rfl: 3    Multiple Vitamin (MULTIVITAMIN ADULT PO), Take 1 tablet by mouth daily, Disp: , Rfl:     aspirin 325 MG tablet, Take 325 mg by mouth daily, Disp: , Rfl:     niacin 500 MG tablet, Take 500 mg by mouth daily. , Disp: , Rfl:       Objective:   PHYSICAL EXAM:    There were no vitals taken for this visit. Physical exam:     Exam:  Gen: No acute distress, does not appear to be in pain. Appears well developed and nourished. HENT: Head is normocephalic and atraumatic. Normal appearing nose. External Ears normal.   Neck: No visualized mass. Trachea is midline   Eyes: EOM intact. No visible discharge. Resp:No visualized signs of difficulty breathing or respiratory distress, speaking in full sentences. Respiratory effort normal.  Neuro: Awake. Alert. Able to follow commands. No facial asymmetry. Skin: No significant lesions or discoloration noted on facial skin    Musculoskeletal: Normal range of motion of the neck.   Psych: Oriented x 3. No anxiety. Normal affect. DATA:   7/25/19 PSG AHI 10.6/REM AHI 50.9, low SPO2 81%, PLMS 16.7  8/27/2019 controlled ARGELIA on CPAP, PLMS 23.1, recommendation CPAP 9 cm H2O    CPAP compliance data:  Compliance download report from 1/12/20 to 2/10/20 reviewed today by me and showed patient is using machine 6:32 hrs/night with 100% compliance and AHI 0.8 within this time frame. 30/30days with greater than 4 hours of machine use. CPAP 9 cm H20. Compliance download report from 12/26/20 to 1/24/20 reviewed today by me and showed patient is using machine 6:08 hrs/night with 93% compliance and AHI 0.7 within this time frame. 28/30days with greater than 4 hours of machine use. CPAP 9 cm H20     Compliance download report from 12/26/20 to 1/24/20 reviewed today by me and showed patient is using machine 6:08 hrs/night with 93% compliance and AHI 0.7 within this time frame. 28/30days with greater than 4 hours of machine use. CPAP 9 cm H20       Compliance download report from 12/27/21 to 1/25/22 reviewed today by me and showed patient is using machine 6:05 hrs/night with 93% compliance and AHI 0.5 within this time frame. 28/30days with greater than 4 hours of machine use. CPAP 9 cm H20     Compliance download report from 12/25/22 to 1/23/23 reviewed today by me and showed patient is using machine 6:48 hrs/night with 93% compliance and AHI 0.5 within this time frame. 28/30days with greater than 4 hours of machine use. CPAP 10 cm H20         Assessment:      Mild ARGELIA. CPAP 10 cm H2O. Optimal compliance and efficacy on review today  PLMS-denies RLS symptoms  Hypertension, CAD, PVCs- followed by cardiology  DOT   Sleep maintenance insomnia-psychophysiological hyperarousal, poor sleep hygiene, early bedtime likely contributing    Plan:       -Continue CPAP 10 cm H2O  -Interpreted and reviewed CPAP download report with patient  - Advised to use CPAP 6-8 hrs at night and during naps.   - Replacement of mask, tubing, head straps every 3-6 months or sooner if damaged. - Patient instructed to contact [employer] company for any mask, tubing or machine trouble shooting if problems arise.  - Sleep hygiene  -Cognitive behavioral therapy was discussed with patient including stimulus control and sleep restriction  -Recommend delay bedtime, no naps in the daytime, no TV if wakes at night  - Avoid sedatives, alcohol and caffeinated drinks at bed time. - Patient counseled to never drive or operate heavy machinery while fatigue, drowsy or sleepy or if has not used CPAP at least 4 hours the night prior-patient verbalized understanding and agrees. - Weight loss is recommended as a long-term intervention.    - Complications of ARGELIA if not treated were discussed with patient patient, including: systemic hypertension, pulmonary hypertension, cardiovascular morbidities, car accidents and all cause mortality.  -Patient education provided regarding sleep tips and CPAP cleaning recommendations   -Continue blood pressure medications as ordered by treating provider- treatment of ARGELIA can lower blood pressure by levels that are clinically significant. Follow up: 1 year, sooner if needed    Alana Lackey, was evaluated through a synchronous (real-time) audio-video encounter. The patient (or guardian if applicable) is aware that this is a billable service, which includes applicable co-pays. This Virtual Visit was conducted with patient's (and/or legal guardian's) consent. The visit was conducted pursuant to the emergency declaration under the [facility], [facility] authority and the [employer] and [employer] General Act. Patient identification was verified, and a caregiver was present when appropriate. The patient was located at Home: [address] [place] [address]. Provider was located at Home ([address]: [place].         Total time spent for this encounter: Not billed by time    --OLIVIER Castano - CNP on 1/27/2023 at 10:11 AM    An electronic signature was used to authenticate this note.

## 2023-01-27 NOTE — PATIENT INSTRUCTIONS

## 2023-02-09 SDOH — ECONOMIC STABILITY: INCOME INSECURITY: HOW HARD IS IT FOR YOU TO PAY FOR THE VERY BASICS LIKE FOOD, HOUSING, MEDICAL CARE, AND HEATING?: NOT VERY HARD

## 2023-02-09 SDOH — ECONOMIC STABILITY: FOOD INSECURITY: WITHIN THE PAST 12 MONTHS, YOU WORRIED THAT YOUR FOOD WOULD RUN OUT BEFORE YOU GOT MONEY TO BUY MORE.: NEVER TRUE

## 2023-02-09 SDOH — ECONOMIC STABILITY: HOUSING INSECURITY
IN THE LAST 12 MONTHS, WAS THERE A TIME WHEN YOU DID NOT HAVE A STEADY PLACE TO SLEEP OR SLEPT IN A SHELTER (INCLUDING NOW)?: NO

## 2023-02-09 SDOH — ECONOMIC STABILITY: TRANSPORTATION INSECURITY
IN THE PAST 12 MONTHS, HAS LACK OF TRANSPORTATION KEPT YOU FROM MEETINGS, WORK, OR FROM GETTING THINGS NEEDED FOR DAILY LIVING?: NO

## 2023-02-09 SDOH — ECONOMIC STABILITY: FOOD INSECURITY: WITHIN THE PAST 12 MONTHS, THE FOOD YOU BOUGHT JUST DIDN'T LAST AND YOU DIDN'T HAVE MONEY TO GET MORE.: NEVER TRUE

## 2023-02-10 ENCOUNTER — OFFICE VISIT (OUTPATIENT)
Dept: FAMILY MEDICINE CLINIC | Age: 68
End: 2023-02-10
Payer: MEDICARE

## 2023-02-10 VITALS
OXYGEN SATURATION: 96 % | DIASTOLIC BLOOD PRESSURE: 76 MMHG | BODY MASS INDEX: 25.95 KG/M2 | WEIGHT: 141 LBS | SYSTOLIC BLOOD PRESSURE: 124 MMHG | HEART RATE: 54 BPM | HEIGHT: 62 IN

## 2023-02-10 DIAGNOSIS — G47.33 OSA (OBSTRUCTIVE SLEEP APNEA): ICD-10-CM

## 2023-02-10 DIAGNOSIS — Z72.0 TOBACCO ABUSE: ICD-10-CM

## 2023-02-10 DIAGNOSIS — I25.119 ATHEROSCLEROSIS OF NATIVE CORONARY ARTERY OF NATIVE HEART WITH ANGINA PECTORIS (HCC): Primary | ICD-10-CM

## 2023-02-10 DIAGNOSIS — I10 ESSENTIAL HYPERTENSION: ICD-10-CM

## 2023-02-10 DIAGNOSIS — Z85.46 HISTORY OF MALIGNANT NEOPLASM OF PROSTATE: ICD-10-CM

## 2023-02-10 DIAGNOSIS — E11.9 TYPE 2 DIABETES MELLITUS WITHOUT COMPLICATION, WITHOUT LONG-TERM CURRENT USE OF INSULIN (HCC): ICD-10-CM

## 2023-02-10 DIAGNOSIS — I50.22 CHRONIC SYSTOLIC (CONGESTIVE) HEART FAILURE (HCC): ICD-10-CM

## 2023-02-10 DIAGNOSIS — Z12.11 COLON CANCER SCREENING: ICD-10-CM

## 2023-02-10 DIAGNOSIS — E78.5 HYPERLIPIDEMIA LDL GOAL <130: ICD-10-CM

## 2023-02-10 LAB
A/G RATIO: 1.7 (ref 1.1–2.2)
ALBUMIN SERPL-MCNC: 4.3 G/DL (ref 3.4–5)
ALP BLD-CCNC: 74 U/L (ref 40–129)
ALT SERPL-CCNC: 16 U/L (ref 10–40)
ANION GAP SERPL CALCULATED.3IONS-SCNC: 14 MMOL/L (ref 3–16)
AST SERPL-CCNC: 19 U/L (ref 15–37)
BASOPHILS ABSOLUTE: 0 K/UL (ref 0–0.2)
BASOPHILS RELATIVE PERCENT: 0.5 %
BILIRUB SERPL-MCNC: 0.3 MG/DL (ref 0–1)
BUN BLDV-MCNC: 22 MG/DL (ref 7–20)
CALCIUM SERPL-MCNC: 9.6 MG/DL (ref 8.3–10.6)
CHLORIDE BLD-SCNC: 100 MMOL/L (ref 99–110)
CHOLESTEROL, TOTAL: 161 MG/DL (ref 0–199)
CO2: 26 MMOL/L (ref 21–32)
CREAT SERPL-MCNC: 0.8 MG/DL (ref 0.8–1.3)
EOSINOPHILS ABSOLUTE: 0.4 K/UL (ref 0–0.6)
EOSINOPHILS RELATIVE PERCENT: 6.3 %
GFR SERPL CREATININE-BSD FRML MDRD: >60 ML/MIN/{1.73_M2}
GLUCOSE BLD-MCNC: 107 MG/DL (ref 70–99)
HCT VFR BLD CALC: 45.7 % (ref 40.5–52.5)
HDLC SERPL-MCNC: 43 MG/DL (ref 40–60)
HEMOGLOBIN: 14.5 G/DL (ref 13.5–17.5)
LDL CHOLESTEROL CALCULATED: 97 MG/DL
LYMPHOCYTES ABSOLUTE: 1.7 K/UL (ref 1–5.1)
LYMPHOCYTES RELATIVE PERCENT: 30.3 %
MCH RBC QN AUTO: 29.7 PG (ref 26–34)
MCHC RBC AUTO-ENTMCNC: 31.6 G/DL (ref 31–36)
MCV RBC AUTO: 93.9 FL (ref 80–100)
MONOCYTES ABSOLUTE: 0.6 K/UL (ref 0–1.3)
MONOCYTES RELATIVE PERCENT: 9.9 %
NEUTROPHILS ABSOLUTE: 3 K/UL (ref 1.7–7.7)
NEUTROPHILS RELATIVE PERCENT: 53 %
PDW BLD-RTO: 14.8 % (ref 12.4–15.4)
PLATELET # BLD: 267 K/UL (ref 135–450)
PMV BLD AUTO: 8.2 FL (ref 5–10.5)
POTASSIUM SERPL-SCNC: 4.3 MMOL/L (ref 3.5–5.1)
PROSTATE SPECIFIC ANTIGEN: <0.01 NG/ML (ref 0–4)
RBC # BLD: 4.87 M/UL (ref 4.2–5.9)
SODIUM BLD-SCNC: 140 MMOL/L (ref 136–145)
TOTAL PROTEIN: 6.9 G/DL (ref 6.4–8.2)
TRIGL SERPL-MCNC: 107 MG/DL (ref 0–150)
VLDLC SERPL CALC-MCNC: 21 MG/DL
WBC # BLD: 5.7 K/UL (ref 4–11)

## 2023-02-10 PROCEDURE — 4004F PT TOBACCO SCREEN RCVD TLK: CPT | Performed by: NURSE PRACTITIONER

## 2023-02-10 PROCEDURE — G8417 CALC BMI ABV UP PARAM F/U: HCPCS | Performed by: NURSE PRACTITIONER

## 2023-02-10 PROCEDURE — 99214 OFFICE O/P EST MOD 30 MIN: CPT | Performed by: NURSE PRACTITIONER

## 2023-02-10 PROCEDURE — 3078F DIAST BP <80 MM HG: CPT | Performed by: NURSE PRACTITIONER

## 2023-02-10 PROCEDURE — 2022F DILAT RTA XM EVC RTNOPTHY: CPT | Performed by: NURSE PRACTITIONER

## 2023-02-10 PROCEDURE — 3074F SYST BP LT 130 MM HG: CPT | Performed by: NURSE PRACTITIONER

## 2023-02-10 PROCEDURE — 36415 COLL VENOUS BLD VENIPUNCTURE: CPT | Performed by: NURSE PRACTITIONER

## 2023-02-10 PROCEDURE — 3046F HEMOGLOBIN A1C LEVEL >9.0%: CPT | Performed by: NURSE PRACTITIONER

## 2023-02-10 PROCEDURE — 1123F ACP DISCUSS/DSCN MKR DOCD: CPT | Performed by: NURSE PRACTITIONER

## 2023-02-10 PROCEDURE — 3017F COLORECTAL CA SCREEN DOC REV: CPT | Performed by: NURSE PRACTITIONER

## 2023-02-10 PROCEDURE — G8484 FLU IMMUNIZE NO ADMIN: HCPCS | Performed by: NURSE PRACTITIONER

## 2023-02-10 PROCEDURE — G8427 DOCREV CUR MEDS BY ELIG CLIN: HCPCS | Performed by: NURSE PRACTITIONER

## 2023-02-10 ASSESSMENT — PATIENT HEALTH QUESTIONNAIRE - PHQ9
SUM OF ALL RESPONSES TO PHQ9 QUESTIONS 1 & 2: 0
6. FEELING BAD ABOUT YOURSELF - OR THAT YOU ARE A FAILURE OR HAVE LET YOURSELF OR YOUR FAMILY DOWN: 0
SUM OF ALL RESPONSES TO PHQ QUESTIONS 1-9: 0
10. IF YOU CHECKED OFF ANY PROBLEMS, HOW DIFFICULT HAVE THESE PROBLEMS MADE IT FOR YOU TO DO YOUR WORK, TAKE CARE OF THINGS AT HOME, OR GET ALONG WITH OTHER PEOPLE: 0
2. FEELING DOWN, DEPRESSED OR HOPELESS: 0
7. TROUBLE CONCENTRATING ON THINGS, SUCH AS READING THE NEWSPAPER OR WATCHING TELEVISION: 0
5. POOR APPETITE OR OVEREATING: 0
8. MOVING OR SPEAKING SO SLOWLY THAT OTHER PEOPLE COULD HAVE NOTICED. OR THE OPPOSITE, BEING SO FIGETY OR RESTLESS THAT YOU HAVE BEEN MOVING AROUND A LOT MORE THAN USUAL: 0
1. LITTLE INTEREST OR PLEASURE IN DOING THINGS: 0
3. TROUBLE FALLING OR STAYING ASLEEP: 0
9. THOUGHTS THAT YOU WOULD BE BETTER OFF DEAD, OR OF HURTING YOURSELF: 0
4. FEELING TIRED OR HAVING LITTLE ENERGY: 0
SUM OF ALL RESPONSES TO PHQ QUESTIONS 1-9: 0

## 2023-02-10 ASSESSMENT — ENCOUNTER SYMPTOMS
BLOOD IN STOOL: 0
COUGH: 0
CONSTIPATION: 0
CHEST TIGHTNESS: 0
SHORTNESS OF BREATH: 0
DIARRHEA: 0

## 2023-02-10 NOTE — PROGRESS NOTES
2/10/2023    Chief Complaint   Patient presents with    Hypertension     BP at home has been running 130's over 60's. Denies edema, SOB or chest pain    Cholesterol Problem     Patient is fasting this morning    Other     ARGELIA       Layman Risk is a 79 y.o. male, presents today for:      ASSESSMENT/PLAN:    1. Atherosclerosis of native coronary artery of native heart with angina pectoris (HCC)  Stable  Continue Nadolol, HCTZ, Isosorbide  Continue care with Cardio, appreciate assistance. Needs to make updated appt with EP and general Cardio  Encouraged heart healthy diet  Encouraged 30 min activity daily  - CBC with Auto Differential  - Comprehensive Metabolic Panel  - LIPID PANEL    2. Chronic systolic (congestive) heart failure (HCC)  Stable  Continue Nadolol, HCTZ, Isosorbide  Continue care with Cardio, appreciate assistance  Encouraged heart healthy diet  Encouraged 30 min activity daily    3. Type 2 diabetes mellitus without complication, without long-term current use of insulin (HCC)  New diagnosis last OV, A1C 6.5%  Labs today  No medications indicated at this time. DM Foot Exam completed 2/2023  On statin, not ACE  Goal A1C <7, Goal BP <130/90  - LIPID PANEL  - Hemoglobin A1C  - Microalbumin / Creatinine Urine Ratio  -  DIABETES FOOT EXAM    4. Essential hypertension  See above    5. ARGELIA (obstructive sleep apnea)  Stable today  Continue CPAP 10 cm H20  Continue care with Pulm    6. Hyperlipidemia LDL goal <130  Stable today  Continue Pravastatin  Labs ordered today    7. History of malignant neoplasm of prostate  Asymptomatic today  PSA <0.01 2/2022  No longer following with Urology  - PSA, Prostatic Specific Antigen    8. Tobacco abuse  Encouraged cessation, pt in precontemplation    9. Colon cancer screening  Referral to GI for colonoscopy  - [facility] - Gianna Jenkins MD, Gastroenterology, [facility]    Return in about 6 months (around 8/10/2023) for awv, htn/ dm.        Presenting today for chronic disease follow up. No new questions/ concerns. CAD/ Bradycardia with PVCs/ HTN: Continuing to follow with Cardiology, needs to make updated appt. Asymptomatic today, no CP, SOB, leg swelling. 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 Does not need refills. Continuing to smoke 1/2ppd, not interested in quitting. HLD: Tolerating Atorvastatin well. No myalgias, leg pain, SOB. ARGELIA: Tolerating CPAP well, using every night. Recent OV with Pulm  Prostate Cancer: No longer following with Urology, obtaining PSA yearly through this office  DM: Not checking BS, not currently on medications. PHQ Scores 2/10/2023 11/18/2022 8/10/2022 2/17/2022 3/2/2021 2/24/2021 9/3/2020   PHQ2 Score 0 0 0 0 0 0 0   PHQ9 Score 0 0 0 0 0 0 0     Interpretation of Total Score Depression Severity: 1-4 = Minimal depression, 5-9 = Mild depression, 10-14 = Moderate depression, 15-19 = Moderately severe depression, 20-27 = Severe depression      Lab Results   Component Value Date     12/15/2022    K 3.9 12/15/2022     12/15/2022    CO2 25 12/15/2022    BUN 16 12/15/2022    CREATININE 0.8 12/15/2022    GLUCOSE 85 12/15/2022    CALCIUM 9.8 12/15/2022    PROT 7.1 12/15/2022    LABALBU 4.5 12/15/2022    BILITOT 0.3 12/15/2022    ALKPHOS 96 12/15/2022    AST 18 12/15/2022    ALT 22 12/15/2022    LABGLOM >60 12/15/2022    GFRAA >60 08/11/2022    AGRATIO 1.7 12/15/2022    GLOB 2.4 02/23/2021         Review of Systems   Constitutional: Negative. Respiratory:  Negative for cough, chest tightness and shortness of breath. Cardiovascular: Negative. Gastrointestinal:  Negative for blood in stool, constipation and diarrhea. Skin: Negative. Neurological:  Negative for dizziness, tremors, light-headedness and headaches. Psychiatric/Behavioral:  Negative for decreased concentration, dysphoric mood, self-injury, sleep disturbance and suicidal ideas. The patient is not nervous/anxious. Current Outpatient Medications on File Prior to Visit   Medication Sig Dispense Refill    pravastatin (PRAVACHOL) 40 MG tablet TAKE ONE TABLET BY MOUTH DAILY 90 tablet 3    nadolol (CORGARD) 20 MG tablet Take 1 tablet by mouth daily TAKE ONE TABLET BY MOUTH DAILY. Ok to increase to twice daily as needed for palpitations. 90 tablet 5    isosorbide mononitrate (IMDUR) 30 MG extended release tablet TAKE ONE TABLET BY MOUTH DAILY 90 tablet 3    hydroCHLOROthiazide (HYDRODIURIL) 25 MG tablet TAKE ONE TABLET BY MOUTH DAILY 90 tablet 3    Multiple Vitamin (MULTIVITAMIN ADULT PO) Take 1 tablet by mouth daily      aspirin 325 MG tablet Take 325 mg by mouth daily      niacin 500 MG tablet Take 500 mg by mouth daily. No current facility-administered medications on file prior to visit. No Known Allergies  Past Medical History:   Diagnosis Date    Abnormal echocardiogram 04/2019    Hyperlipidemia     Hypertension     [facility] [address] 3/2/2021    Prostate cancer ([facility] 2012    total prostatectomy,DrBey    Sleep apnea     Smoker      Past Surgical History:   Procedure Laterality Date    CARDIAC CATHETERIZATION  04/23/2019    Non Obs CAD, medical management    COLONOSCOPY  3/22/12    EYE SURGERY  1989    eye trauma os with fixed pupil;vision is ok;    PROSTATE BIOPSY      PROSTATECTOMY  02/14/2012     Social History     Tobacco Use    Smoking status: Every Day     Packs/day: 0.50     Years: 30.00     Pack years: 15.00     Types: Cigarettes    Smokeless tobacco: Current     Types: Snuff   Substance Use Topics    Alcohol use:  Yes     Alcohol/week: 0.0 standard drinks     Comment: rare     Family History   Problem Relation Age of Onset    Stroke Father     Heart Disease Father     Diabetes Father        Vitals:    02/10/23 0821   BP: 124/76   Site: Left Upper Arm   Position: Sitting   Cuff Size: Medium Adult   Pulse: 54   SpO2: 96%   Weight: 141 lb (64 kg)   Height: 5' 2\" (1.575 m)     Estimated body mass index is 25.79 kg/m² as calculated from the following:    Height as of this encounter: 5' 2\" (1.575 m). Weight as of this encounter: 141 lb (64 kg). Physical Exam  Vitals and nursing note reviewed. Constitutional:       Appearance: Normal appearance. Eyes:      Extraocular Movements: Extraocular movements intact. Conjunctiva/sclera: Conjunctivae normal.      Pupils: Pupils are equal, round, and reactive to light. Neck:      Vascular: No carotid bruit. Cardiovascular:      Rate and Rhythm: Normal rate and regular rhythm. Pulses: Normal pulses. Carotid pulses are 2+ on the right side and 2+ on the left side. Dorsalis pedis pulses are 2+ on the right side and 2+ on the left side. Posterior tibial pulses are 2+ on the right side and 2+ on the left side. Heart sounds: Normal heart sounds. Pulmonary:      Effort: Pulmonary effort is normal.   Abdominal:      General: Abdomen is flat. Palpations: Abdomen is soft. Musculoskeletal:      Cervical back: Normal range of motion. Right lower leg: No edema. Left lower leg: No edema. Right foot: Normal range of motion. No deformity, bunion, Charcot foot, foot drop or prominent metatarsal heads. Left foot: Normal range of motion. No deformity, bunion, Charcot foot, foot drop or prominent metatarsal heads. Feet:      Right foot:      Protective Sensation: 10 sites tested. 10 sites sensed. Skin integrity: Callus (lateral aspect great toe) and dry skin (between all toes) present. Toenail Condition: Right toenails are abnormally thick and long. Left foot:      Protective Sensation: 10 sites tested. 10 sites sensed. Skin integrity: Callus (lateral aspect great toe) and dry skin (between all toes) present. Toenail Condition: Left toenails are abnormally thick and long. Lymphadenopathy:      Cervical: No cervical adenopathy. Skin:     General: Skin is warm and dry.       Capillary Refill: Capillary refill takes less than 2 seconds. Neurological:      General: No focal deficit present. Mental Status: He is alert and oriented to person, place, and time. Psychiatric:         Mood and Affect: Mood normal.         Behavior: Behavior normal.         Patient's questions answered and concerns addressed. Patient agrees to plan of care.         Electronically signed by OLIVIER Tracy CNP on 2/10/2023 at 8:59 AM

## 2023-02-11 LAB
ESTIMATED AVERAGE GLUCOSE: 128.4 MG/DL
HBA1C MFR BLD: 6.1 %

## 2023-02-12 LAB
CREATININE URINE: 146.7 MG/DL (ref 39–259)
MICROALBUMIN UR-MCNC: <1.2 MG/DL
MICROALBUMIN/CREAT UR-RTO: NORMAL MG/G (ref 0–30)

## 2023-03-17 ENCOUNTER — TELEPHONE (OUTPATIENT)
Dept: FAMILY MEDICINE CLINIC | Age: 68
End: 2023-03-17

## 2023-03-17 NOTE — TELEPHONE ENCOUNTER
Catie lucero/ MERARI Telles MD) office (NG#[phone]) called to say they have this pt scheduled for colonoscopy, late in month, but want to see if pt can be d/c from aspirin prior. Also asking why pt needs to be on 325 mg vs low dose. Please advise.  Thank you

## 2023-04-19 ENCOUNTER — HOSPITAL ENCOUNTER (OUTPATIENT)
Age: 68
Setting detail: OUTPATIENT SURGERY
Discharge: HOME | End: 2023-04-19
Attending: INTERNAL MEDICINE | Admitting: INTERNAL MEDICINE
Payer: MEDICARE

## 2023-04-19 ENCOUNTER — ANESTHESIA EVENT (OUTPATIENT)
Dept: ENDOSCOPY | Age: 68
End: 2023-04-19
Payer: MEDICARE

## 2023-04-19 ENCOUNTER — ANESTHESIA (OUTPATIENT)
Dept: ENDOSCOPY | Age: 68
End: 2023-04-19
Payer: MEDICARE

## 2023-04-19 VITALS
WEIGHT: 142 LBS | DIASTOLIC BLOOD PRESSURE: 83 MMHG | TEMPERATURE: 98.4 F | HEART RATE: 74 BPM | HEIGHT: 62 IN | OXYGEN SATURATION: 94 % | BODY MASS INDEX: 26.13 KG/M2 | RESPIRATION RATE: 16 BRPM | SYSTOLIC BLOOD PRESSURE: 128 MMHG

## 2023-04-19 DIAGNOSIS — Z12.11 SPECIAL SCREENING FOR MALIGNANT NEOPLASMS, COLON: ICD-10-CM

## 2023-04-19 PROCEDURE — 3609010300 HC COLONOSCOPY W/BIOPSY SINGLE/MULTIPLE: Performed by: INTERNAL MEDICINE

## 2023-04-19 PROCEDURE — 88305 TISSUE EXAM BY PATHOLOGIST: CPT

## 2023-04-19 PROCEDURE — 2580000003 HC RX 258: Performed by: NURSE ANESTHETIST, CERTIFIED REGISTERED

## 2023-04-19 PROCEDURE — 6360000002 HC RX W HCPCS: Performed by: NURSE ANESTHETIST, CERTIFIED REGISTERED

## 2023-04-19 PROCEDURE — 2709999900 HC NON-CHARGEABLE SUPPLY: Performed by: INTERNAL MEDICINE

## 2023-04-19 PROCEDURE — 7100000011 HC PHASE II RECOVERY - ADDTL 15 MIN: Performed by: INTERNAL MEDICINE

## 2023-04-19 PROCEDURE — 7100000010 HC PHASE II RECOVERY - FIRST 15 MIN: Performed by: INTERNAL MEDICINE

## 2023-04-19 PROCEDURE — 3700000001 HC ADD 15 MINUTES (ANESTHESIA): Performed by: INTERNAL MEDICINE

## 2023-04-19 PROCEDURE — 3700000000 HC ANESTHESIA ATTENDED CARE: Performed by: INTERNAL MEDICINE

## 2023-04-19 RX ORDER — SODIUM CHLORIDE, SODIUM LACTATE, POTASSIUM CHLORIDE, CALCIUM CHLORIDE 600; 310; 30; 20 MG/100ML; MG/100ML; MG/100ML; MG/100ML
INJECTION, SOLUTION INTRAVENOUS CONTINUOUS
Status: DISCONTINUED | OUTPATIENT
Start: 2023-04-19 | End: 2023-04-19 | Stop reason: HOSPADM

## 2023-04-19 RX ORDER — SODIUM CHLORIDE, SODIUM LACTATE, POTASSIUM CHLORIDE, CALCIUM CHLORIDE 600; 310; 30; 20 MG/100ML; MG/100ML; MG/100ML; MG/100ML
INJECTION, SOLUTION INTRAVENOUS CONTINUOUS PRN
Status: DISCONTINUED | OUTPATIENT
Start: 2023-04-19 | End: 2023-04-19 | Stop reason: SDUPTHER

## 2023-04-19 RX ORDER — ASPIRIN 325 MG
325 TABLET ORAL DAILY
COMMUNITY

## 2023-04-19 RX ORDER — PROPOFOL 10 MG/ML
INJECTION, EMULSION INTRAVENOUS PRN
Status: DISCONTINUED | OUTPATIENT
Start: 2023-04-19 | End: 2023-04-19 | Stop reason: SDUPTHER

## 2023-04-19 RX ADMIN — SODIUM CHLORIDE, POTASSIUM CHLORIDE, SODIUM LACTATE AND CALCIUM CHLORIDE: 600; 310; 30; 20 INJECTION, SOLUTION INTRAVENOUS at 14:20

## 2023-04-19 RX ADMIN — PROPOFOL 50 MG: 10 INJECTION, EMULSION INTRAVENOUS at 14:40

## 2023-04-19 RX ADMIN — PROPOFOL 50 MG: 10 INJECTION, EMULSION INTRAVENOUS at 14:33

## 2023-04-19 RX ADMIN — PROPOFOL 100 MG: 10 INJECTION, EMULSION INTRAVENOUS at 14:27

## 2023-04-19 ASSESSMENT — LIFESTYLE VARIABLES: SMOKING_STATUS: 1

## 2023-04-19 ASSESSMENT — PAIN - FUNCTIONAL ASSESSMENT: PAIN_FUNCTIONAL_ASSESSMENT: 0-10

## 2023-04-19 NOTE — PROCEDURES
removed    Recommendations:  Await pathology.     Dean Fried MD, MD   [facility]  4/19/2023

## 2023-04-19 NOTE — H&P
Gastroenterology Note             Pre-operative History and Physical    Patient: Preston Farr  : 1955  CSN:     History Obtained From:  patient and/or guardian. HISTORY OF PRESENT ILLNESS:    The patient is a 79 y.o. male  here for colonoscopy. Past Medical History:    Past Medical History:   Diagnosis Date    Abnormal echocardiogram 2019    Hyperlipidemia     Hypertension     Myocardiopathy ([facility] [address] [address] 3/2/2021    Prostate cancer ([facility] [address]     total prostatectomy,DrBey    Sleep apnea     Smoker      Past Surgical History:    Past Surgical History:   Procedure Laterality Date    CARDIAC CATHETERIZATION  2019    Non Obs CAD, medical management    COLONOSCOPY  3/22/12    EYE SURGERY      eye trauma os with fixed pupil;vision is ok;    PROSTATE BIOPSY      PROSTATECTOMY  2012     Medications Prior to Admission:   No current facility-administered medications on file prior to encounter. Current Outpatient Medications on File Prior to Encounter   Medication Sig Dispense Refill    aspirin 325 MG tablet Take 1 tablet by mouth daily      pravastatin (PRAVACHOL) 40 MG tablet TAKE ONE TABLET BY MOUTH DAILY 90 tablet 3    nadolol (CORGARD) 20 MG tablet Take 1 tablet by mouth daily TAKE ONE TABLET BY MOUTH DAILY. Ok to increase to twice daily as needed for palpitations. (Patient taking differently: Take 1 tablet by mouth at bedtime TAKE ONE TABLET BY MOUTH DAILY.  Ok to increase to twice daily as needed for palpitations.) 90 tablet 5    isosorbide mononitrate (IMDUR) 30 MG extended release tablet TAKE ONE TABLET BY MOUTH DAILY (Patient taking differently: Take by mouth at bedtime TAKE ONE TABLET BY MOUTH DAILY) 90 tablet 3    hydroCHLOROthiazide (HYDRODIURIL) 25 MG tablet TAKE ONE TABLET BY MOUTH DAILY 90 tablet 3    Multiple Vitamin (MULTIVITAMIN ADULT PO) Take 1 tablet by mouth daily      niacin 500 MG tablet Take 1 tablet by mouth daily          Allergies:  Patient has no known

## 2023-04-19 NOTE — ANESTHESIA POSTPROCEDURE EVALUATION
Department of Anesthesiology  Postprocedure Note    Patient: Trinh Turner  MRN: [id number]  YOB: 1955  Date of evaluation: 4/19/2023      Procedure Summary     Date: 04/19/23 Room / Location: [place] [address] / [facility]    Anesthesia Start: 3940 Anesthesia Stop: 1448    Procedure: COLONOSCOPY WITH BIOPSY Diagnosis:       Special screening for malignant neoplasms, colon      (Special screening for malignant neoplasms, colon [Z12.11])    Surgeons: Sydnie Mota MD Responsible Provider: Jamari Munoz MD    Anesthesia Type: MAC ASA Status: 3          Anesthesia Type: No value filed.     Jair Phase I: Jair Score: 10    Jair Phase II: Jair Score: 10      Anesthesia Post Evaluation    Comments: Postoperative Anesthesia Note    Name:    Trinh Turner  MRN:      [id number]    Patient Vitals in the past 12 hrs:  04/19/23 1514, BP:128/83, Pulse:74, Resp:16, SpO2:94 %  04/19/23 1510, BP:129/79, Pulse:72, Resp:16, SpO2:95 %  04/19/23 1506, BP:126/88, Temp:98.4 °F (36.9 °C), Temp src:Temporal, Pulse:77, Resp:16, SpO2:95 %  04/19/23 1501, BP:109/71, Pulse:87, Resp:16, SpO2:96 %  04/19/23 1457, BP:111/86, Pulse:78, Resp:16, SpO2:93 %  04/19/23 1452, BP:122/70, Temp:98.4 °F (36.9 °C), Temp src:Temporal, Pulse:67, Resp:16, SpO2:91 %  04/19/23 1313, BP:(!) 148/95, Temp:97.4 °F (36.3 °C), Temp src:Temporal, Pulse:76, Resp:16, SpO2:95 %     LABS:    CBC  Lab Results       Component                Value               Date/Time                  WBC                      5.7                 02/10/2023 08:51 AM        HGB                      14.5                02/10/2023 08:51 AM        HCT                      45.7                02/10/2023 08:51 AM        PLT                      267                 02/10/2023 08:51 AM   RENAL  Lab Results       Component                Value               Date/Time                  NA                       140                 02/10/2023 08:51 AM

## 2023-04-19 NOTE — PROGRESS NOTES
PAT completed with patient orders placed per MD, patient aware of 1300 arrival time on 04/19/2023 at main [facility] states his daughter will be  and caregiver day of procedure. Ana Owens RN
Patient's discharge instructions given to jerman Hebert at this time. Patient taken out in stable condition per wheelchair at this time.
Phase II:  1. Patient is identified using name and the date of birth. 2.  The patient is free from signs and symptoms of chemical, electrical, laser, radiation, positioning, or transfer/transport injury. 3.  The patient receives appropriate medication(s), safely administered during the Perioperative period. 4.  The patient has wound/tissue perfusion consistent with or improved from baseline levels established preoperatively. 5.  The patient is at or returning to normothermia at the conclusion of the immediate postoperative period. 6.  The patient's fluid, electrolyte, and acid base balances are consistent with or improved from baseline levels established preoperatively. 7.  The patient's pulmonary function is consistent with or improved from baseline levels established preoperatively. 8.  The patient's cardiovascular status is consistent with or improved from baseline levels established preoperatively. 9.  The patient/caregiver demonstrates knowledge of nutritional management related to the operative or other invasive procedure. 10. The patient/caregiver demonstrates knowledge of medication, pain, and wound management. 11. The patient participates in the rehabilitation process as applicable. 12.  The patient/caregiver participates in decisions affection his or her Perioperative plan of care. 13.  The patient's care is consistent with the individualized Perioperative plan of care. 14.  The patient's right to privacy is maintained. 15. The patient is the recipient of competent and ethical care within legal standards of practice. 16.  The patient's value system, lifestyle, ethnicity, and culture are considered, respected, and incorporated in the Perioperative plan of care and understands special services available. 17.  The patient demonstrates and/or reports adequate pain control throughout the the Perioperative period. 18.   The patient's neurological status is consistent with or improved from
Pre-Operative:  1. Patient/Caregiver identifies - states name and date of birth. 2.  The patient is free from signs and symptoms of injury. 3.  The patient receives appropriate medication(s), safely administered during the Perioperative period. 4.  The patients's fluid, electrolyte, and acid-base balances are established preoperatively. 5.  The patient's pulmonary function is established preoperatively. 6.  The patient's cardiovascular status is established preoperatively. 7.  The patient / caregiver demonstrates knowledge of nutritional management related to the operative or other invasive procedure. 8.  The patient/caregiver demonstrates knowledge of medication management. 9.  The patient/caregiver demonstrates knowledge of pain management. 10.  The patient/caregiver participates in decisions affection his or her Perioperative plan of care. 11. The patient's care is consistent with the individualized Perioperative plan of care. 12.  The patient's right to privacy is maintained. 13. The patient is the recipient of competent and ethical care within legal standards of practice. 14.  The patient's value system, lifestyle, ethnicity, and culture are considered, respected, and incorporated in the Perioperative plan of care and understands special services available. 15.  The patient demonstrates and/or reports adequate pain control throughout the the Perioperative period. 16.  The patient's neurological status is established preoperatively. 17.  The patient/caregiver demonstrates knowledge of the expected responses to the endoscopy procedure. 18.  Patient/Caregiver has reduced anxiety. Interventions- Familiarize with environment and equipment. 19. Patient/Caregiver verbalizes understanding of Phase II and/or Phase I process. 20.  Patient pain level is established preoperatively using age appropriate pain scale. 21.   The patient will move to fall risk upon sedation- during and through the recovery
vision between now and the time of your surgery. To provide excellent care, visitors will be limited to two in a room at any given time. Please no children under the age of 15 in the surgical department.

## 2023-04-19 NOTE — DISCHARGE INSTRUCTIONS
PATIENT INSTRUCTIONS  POST-SEDATION    Diana Rodriguez          IMMEDIATELY FOLLOWING PROCEDURE:    Do not drive or operate machinery for the first twenty four hours after surgery. Do not make any important decisions for twenty four hours after surgery or while taking narcotic pain medications or sedatives. You should NOT BE LEFT UNATTENDED OR ALONE. A responsible adult should be with you for the rest of the day of your procedure and also during the night for your protection and safety. You may experience some light headedness. Rest at home with activity as tolerated. You may not need to go to bed, but it is important to rest for the next 24 hours. You should not engage in athletic sports such as basketball, volleyball, jogging, skating, or activities requiring refined motor skills for 24 hours. If you develop intractable nausea and vomiting or a severe headache please notify your doctor immediately. You are not expected to have any fever, but if you feel warm, take your temperature. If you have a fever 101 degrees or higher, call your doctor. If you have had an Endoscopy:   *Eat lightly for your first meal and gradually resume your normal / prescribed diet. DO NOT eat or drink until your gag reflex returns. *If you have a sore throat you may use lozenges, or salt water gargles. *If you have had a colonoscopy, do not expect a normal bowel movement for approximately three days due to the cleansing of the large intestine prior to colonoscopy. ONCE YOU ARE HOME, IF YOU SHOULD HAVE:  Difficulty in breathing, persistent nausea or vomiting, bleeding you feel is excessive, or pain that is unusual, increased abdominal bloating, or any swelling, fever / chills, call your physician. If you cannot contact your physician, but feel that your signs and symptoms need a physician's attention, go to the Emergency Department. FOLLOW-UP:    Please follow up with Dr. Wnedy Noe as scheduled or needed.

## 2023-04-19 NOTE — ANESTHESIA PRE PROCEDURE
tablet 5    isosorbide mononitrate (IMDUR) 30 MG extended release tablet TAKE ONE TABLET BY MOUTH DAILY (Patient taking differently: Take by mouth at bedtime TAKE ONE TABLET BY MOUTH DAILY) 90 tablet 3    hydroCHLOROthiazide (HYDRODIURIL) 25 MG tablet TAKE ONE TABLET BY MOUTH DAILY 90 tablet 3    Multiple Vitamin (MULTIVITAMIN ADULT PO) Take 1 tablet by mouth daily      niacin 500 MG tablet Take 500 mg by mouth daily. Allergies:  No Known Allergies    Problem List:    Patient Active Problem List   Diagnosis Code    Essential hypertension I10    Hyperlipidemia LDL goal <130 E78.5    History of malignant neoplasm of prostate Z85.46    PVC (premature ventricular contraction) I49.3    Tobacco abuse Z72.0    ARGELIA (obstructive sleep apnea) G47.33    Sinus bradycardia R00.1    Impaired fasting glucose R73.01    Chronic systolic (congestive) heart failure I50.22    Atherosclerosis of native coronary artery of native heart with angina pectoris ([address] I25.119    Type 2 diabetes mellitus without complication, without long-term current use of insulin ([address] E11.9       Past Medical History:        Diagnosis Date    Abnormal echocardiogram 04/2019    Hyperlipidemia     Hypertension     Myocardiopathy ([address] 3/2/2021    Prostate cancer ([address] 2012    total prostatectomy,DrBey    Sleep apnea     Smoker        Past Surgical History:        Procedure Laterality Date    CARDIAC CATHETERIZATION  04/23/2019    Non Obs CAD, medical management    COLONOSCOPY  3/22/12    EYE SURGERY  1989    eye trauma os with fixed pupil;vision is ok;    PROSTATE BIOPSY      PROSTATECTOMY  02/14/2012       Social History:    Social History     Tobacco Use    Smoking status: Every Day     Packs/day: 0.50     Years: 30.00     Pack years: 15.00     Types: Cigarettes    Smokeless tobacco: Current     Types: Snuff   Substance Use Topics    Alcohol use:  Yes     Alcohol/week: 0.0 standard drinks     Comment: rare

## 2023-08-12 SDOH — HEALTH STABILITY: PHYSICAL HEALTH: ON AVERAGE, HOW MANY DAYS PER WEEK DO YOU ENGAGE IN MODERATE TO STRENUOUS EXERCISE (LIKE A BRISK WALK)?: 7 DAYS

## 2023-08-12 SDOH — HEALTH STABILITY: PHYSICAL HEALTH: ON AVERAGE, HOW MANY MINUTES DO YOU ENGAGE IN EXERCISE AT THIS LEVEL?: 150+ MIN

## 2023-08-12 ASSESSMENT — PATIENT HEALTH QUESTIONNAIRE - PHQ9
SUM OF ALL RESPONSES TO PHQ QUESTIONS 1-9: 0
SUM OF ALL RESPONSES TO PHQ QUESTIONS 1-9: 0
2. FEELING DOWN, DEPRESSED OR HOPELESS: 0
SUM OF ALL RESPONSES TO PHQ9 QUESTIONS 1 & 2: 0
SUM OF ALL RESPONSES TO PHQ QUESTIONS 1-9: 0
1. LITTLE INTEREST OR PLEASURE IN DOING THINGS: 0
SUM OF ALL RESPONSES TO PHQ QUESTIONS 1-9: 0

## 2023-08-12 ASSESSMENT — LIFESTYLE VARIABLES
HOW MANY STANDARD DRINKS CONTAINING ALCOHOL DO YOU HAVE ON A TYPICAL DAY: PATIENT DOES NOT DRINK
HOW OFTEN DO YOU HAVE A DRINK CONTAINING ALCOHOL: 1
HOW OFTEN DO YOU HAVE SIX OR MORE DRINKS ON ONE OCCASION: 1
HOW OFTEN DO YOU HAVE A DRINK CONTAINING ALCOHOL: NEVER
HOW MANY STANDARD DRINKS CONTAINING ALCOHOL DO YOU HAVE ON A TYPICAL DAY: 0

## 2023-08-14 ENCOUNTER — OFFICE VISIT (OUTPATIENT)
Dept: FAMILY MEDICINE CLINIC | Age: 68
End: 2023-08-14
Payer: MEDICARE

## 2023-08-14 VITALS
OXYGEN SATURATION: 96 % | DIASTOLIC BLOOD PRESSURE: 74 MMHG | WEIGHT: 139 LBS | TEMPERATURE: 97.6 F | HEART RATE: 50 BPM | BODY MASS INDEX: 25.42 KG/M2 | SYSTOLIC BLOOD PRESSURE: 114 MMHG

## 2023-08-14 DIAGNOSIS — G47.33 OSA (OBSTRUCTIVE SLEEP APNEA): ICD-10-CM

## 2023-08-14 DIAGNOSIS — Z85.46 HISTORY OF MALIGNANT NEOPLASM OF PROSTATE: ICD-10-CM

## 2023-08-14 DIAGNOSIS — I50.22 CHRONIC SYSTOLIC (CONGESTIVE) HEART FAILURE (HCC): ICD-10-CM

## 2023-08-14 DIAGNOSIS — E78.5 HYPERLIPIDEMIA LDL GOAL <130: ICD-10-CM

## 2023-08-14 DIAGNOSIS — E11.9 TYPE 2 DIABETES MELLITUS WITHOUT COMPLICATION, WITHOUT LONG-TERM CURRENT USE OF INSULIN (HCC): ICD-10-CM

## 2023-08-14 DIAGNOSIS — L30.9 DERMATITIS: ICD-10-CM

## 2023-08-14 DIAGNOSIS — I10 ESSENTIAL HYPERTENSION: ICD-10-CM

## 2023-08-14 DIAGNOSIS — Z00.00 MEDICARE ANNUAL WELLNESS VISIT, SUBSEQUENT: Primary | ICD-10-CM

## 2023-08-14 DIAGNOSIS — I25.119 ATHEROSCLEROSIS OF NATIVE CORONARY ARTERY OF NATIVE HEART WITH ANGINA PECTORIS (HCC): ICD-10-CM

## 2023-08-14 DIAGNOSIS — L03.113 CELLULITIS OF RIGHT UPPER EXTREMITY: ICD-10-CM

## 2023-08-14 LAB
ANION GAP SERPL CALCULATED.3IONS-SCNC: 11 MMOL/L (ref 3–16)
BUN SERPL-MCNC: 23 MG/DL (ref 7–20)
CALCIUM SERPL-MCNC: 9.7 MG/DL (ref 8.3–10.6)
CHLORIDE SERPL-SCNC: 103 MMOL/L (ref 99–110)
CHOLEST SERPL-MCNC: 116 MG/DL (ref 0–199)
CO2 SERPL-SCNC: 29 MMOL/L (ref 21–32)
CREAT SERPL-MCNC: 0.9 MG/DL (ref 0.8–1.3)
GFR SERPLBLD CREATININE-BSD FMLA CKD-EPI: >60 ML/MIN/{1.73_M2}
GLUCOSE SERPL-MCNC: 109 MG/DL (ref 70–99)
HDLC SERPL-MCNC: 36 MG/DL (ref 40–60)
LDLC SERPL CALC-MCNC: 64 MG/DL
POTASSIUM SERPL-SCNC: 4.8 MMOL/L (ref 3.5–5.1)
SODIUM SERPL-SCNC: 143 MMOL/L (ref 136–145)
TRIGL SERPL-MCNC: 82 MG/DL (ref 0–150)
VLDLC SERPL CALC-MCNC: 16 MG/DL

## 2023-08-14 PROCEDURE — G8427 DOCREV CUR MEDS BY ELIG CLIN: HCPCS | Performed by: NURSE PRACTITIONER

## 2023-08-14 PROCEDURE — 3074F SYST BP LT 130 MM HG: CPT | Performed by: NURSE PRACTITIONER

## 2023-08-14 PROCEDURE — 1123F ACP DISCUSS/DSCN MKR DOCD: CPT | Performed by: NURSE PRACTITIONER

## 2023-08-14 PROCEDURE — 99213 OFFICE O/P EST LOW 20 MIN: CPT | Performed by: NURSE PRACTITIONER

## 2023-08-14 PROCEDURE — G8417 CALC BMI ABV UP PARAM F/U: HCPCS | Performed by: NURSE PRACTITIONER

## 2023-08-14 PROCEDURE — 2022F DILAT RTA XM EVC RTNOPTHY: CPT | Performed by: NURSE PRACTITIONER

## 2023-08-14 PROCEDURE — 3078F DIAST BP <80 MM HG: CPT | Performed by: NURSE PRACTITIONER

## 2023-08-14 PROCEDURE — 3044F HG A1C LEVEL LT 7.0%: CPT | Performed by: NURSE PRACTITIONER

## 2023-08-14 PROCEDURE — 4004F PT TOBACCO SCREEN RCVD TLK: CPT | Performed by: NURSE PRACTITIONER

## 2023-08-14 PROCEDURE — 3017F COLORECTAL CA SCREEN DOC REV: CPT | Performed by: NURSE PRACTITIONER

## 2023-08-14 PROCEDURE — 36415 COLL VENOUS BLD VENIPUNCTURE: CPT | Performed by: NURSE PRACTITIONER

## 2023-08-14 PROCEDURE — G0439 PPPS, SUBSEQ VISIT: HCPCS | Performed by: NURSE PRACTITIONER

## 2023-08-14 RX ORDER — PREDNISONE 50 MG/1
50 TABLET ORAL DAILY
Qty: 5 TABLET | Refills: 0 | Status: SHIPPED | OUTPATIENT
Start: 2023-08-14 | End: 2023-08-19

## 2023-08-14 RX ORDER — DOXYCYCLINE HYCLATE 100 MG
100 TABLET ORAL 2 TIMES DAILY
Qty: 20 TABLET | Refills: 0 | Status: SHIPPED | OUTPATIENT
Start: 2023-08-14 | End: 2023-08-24

## 2023-08-14 NOTE — PROGRESS NOTES
Blood drawn per order. Needle size: 23 g butterfly   Site: R Antecubital.  First attempt successful Yes    Second attempt na    Pressure applied until bleeding stopped. Cotton ball and band aid  applied. Patient informed to call office or return if bleeding reoccurs and unable to stop.     Tubes drawn: 1 purple     1 red
intact, conjunctivae normal  ENT: tympanic membrane, external ear and ear canal normal bilaterally, nose without deformity, nasal mucosa and turbinates normal without polyps  Neck: supple and non-tender without mass, no thyromegaly or thyroid nodules, no cervical lymphadenopathy  Pulmonary/Chest: clear to auscultation bilaterally- no wheezes, rales or rhonchi, normal air movement, no respiratory distress  Cardiovascular: normal rate, regular rhythm, normal S1 and S2, no murmurs, rubs, clicks, or gallops, distal pulses intact, no carotid bruits  Abdomen: soft, non-tender, non-distended, normal bowel sounds, no masses or organomegaly  Extremities: no cyanosis, clubbing or edema; Right forearm with irregular macular edema, warmth, secondary excoriation. Musculoskeletal: normal range of motion, no joint swelling, deformity or tenderness  Neurologic: reflexes normal and symmetric, no cranial nerve deficit, gait, coordination and speech normal       No Known Allergies  Prior to Visit Medications    Medication Sig Taking? Authorizing Provider   doxycycline hyclate (VIBRA-TABS) 100 MG tablet Take 1 tablet by mouth 2 times daily for 10 days Yes Verlyn Mask, APRN - CNP   predniSONE (DELTASONE) 50 MG tablet Take 1 tablet by mouth daily for 5 days For arm rash Yes Verlyn Mask, APRN - CNP   aspirin 325 MG tablet Take 1 tablet by mouth daily Yes Historical Provider, MD   pravastatin (PRAVACHOL) 40 MG tablet TAKE ONE TABLET BY MOUTH DAILY Yes Verlyn Mask, APRN - CNP   nadolol (CORGARD) 20 MG tablet Take 1 tablet by mouth daily TAKE ONE TABLET BY MOUTH DAILY. Ok to increase to twice daily as needed for palpitations. Patient taking differently: Take 1 tablet by mouth at bedtime TAKE ONE TABLET BY MOUTH DAILY. Ok to increase to twice daily as needed for palpitations.  Yes Verlyn Mask, APRN - CNP   isosorbide mononitrate (IMDUR) 30 MG extended release tablet TAKE ONE TABLET BY MOUTH DAILY  Patient taking differently:

## 2023-08-14 NOTE — ACP (ADVANCE CARE PLANNING)
Advance Care Planning     Advance Care Planning (ACP) Physician/NP/PA Conversation    Date of Conversation: 8/14/2023  Conducted with: Patient with Decision Making Capacity    Healthcare Decision Maker:      Primary Decision Maker: Nima Odom - Melissa - [phone]    Secondary Decision Maker: Cyndie Garcia - Brother/Sister - [phone]    Click here to complete Healthcare Decision Makers including selection of the Healthcare Decision Maker Relationship (ie \"Primary\")  Today we documented Decision Maker(s) consistent with Legal Next of Kin hierarchy. Care Preferences:    Hospitalization: \"If your health worsens and it becomes clear that your chance of recovery is unlikely, what would be your preference regarding hospitalization? \"  The patient would prefer hospitalization. Ventilation: \"If you were unable to breath on your own and your chance of recovery was unlikely, what would be your preference about the use of a ventilator (breathing machine) if it was available to you? \"  The patient is unsure. Resuscitation: \"In the event your heart stopped as a result of an underlying serious health condition, would you want attempts made to restart your heart, or would you prefer a natural death? \"  The patient is unsure.     treatment goals, benefit/burden of treatment options, artificial nutrition, ventilation preferences, hospitalization preferences, resuscitation preferences, end of life care preferences (vegetative state/imminent death), and hospice care    Conversation Outcomes / Follow-Up Plan:  ACP in process - information provided, considering goals and options  Reviewed DNR/DNI and patient elects Full Code (Attempt Resuscitation)    Length of Voluntary ACP Conversation in minutes:  <16 minutes (Non-Billable)    OLIVIER Flores - CNP

## 2023-08-15 LAB
EST. AVERAGE GLUCOSE BLD GHB EST-MCNC: 128.4 MG/DL
HBA1C MFR BLD: 6.1 %

## 2024-01-08 ENCOUNTER — OFFICE VISIT (OUTPATIENT)
Dept: FAMILY MEDICINE CLINIC | Age: 69
End: 2024-01-08
Payer: MEDICARE

## 2024-01-08 VITALS
BODY MASS INDEX: 27.07 KG/M2 | HEART RATE: 65 BPM | WEIGHT: 148 LBS | SYSTOLIC BLOOD PRESSURE: 138 MMHG | OXYGEN SATURATION: 97 % | TEMPERATURE: 98.3 F | DIASTOLIC BLOOD PRESSURE: 86 MMHG

## 2024-01-08 DIAGNOSIS — J02.8 ACUTE PHARYNGITIS DUE TO OTHER SPECIFIED ORGANISMS: ICD-10-CM

## 2024-01-08 DIAGNOSIS — I25.119 ATHEROSCLEROSIS OF NATIVE CORONARY ARTERY OF NATIVE HEART WITH ANGINA PECTORIS (HCC): ICD-10-CM

## 2024-01-08 DIAGNOSIS — R09.81 HEAD CONGESTION: Primary | ICD-10-CM

## 2024-01-08 DIAGNOSIS — I50.22 CHRONIC SYSTOLIC (CONGESTIVE) HEART FAILURE (HCC): ICD-10-CM

## 2024-01-08 DIAGNOSIS — E11.9 TYPE 2 DIABETES MELLITUS WITHOUT COMPLICATION, WITHOUT LONG-TERM CURRENT USE OF INSULIN (HCC): ICD-10-CM

## 2024-01-08 DIAGNOSIS — J02.9 SORE THROAT: ICD-10-CM

## 2024-01-08 LAB
INFLUENZA A ANTIGEN, POC: NEGATIVE
INFLUENZA B ANTIGEN, POC: NEGATIVE
LOT EXPIRE DATE: NORMAL
LOT KIT NUMBER: NORMAL
SARS-COV-2, POC: NORMAL
VALID INTERNAL CONTROL: NORMAL
VENDOR AND KIT NAME POC: NORMAL

## 2024-01-08 PROCEDURE — 3017F COLORECTAL CA SCREEN DOC REV: CPT | Performed by: NURSE PRACTITIONER

## 2024-01-08 PROCEDURE — 2022F DILAT RTA XM EVC RTNOPTHY: CPT | Performed by: NURSE PRACTITIONER

## 2024-01-08 PROCEDURE — G8484 FLU IMMUNIZE NO ADMIN: HCPCS | Performed by: NURSE PRACTITIONER

## 2024-01-08 PROCEDURE — 3079F DIAST BP 80-89 MM HG: CPT | Performed by: NURSE PRACTITIONER

## 2024-01-08 PROCEDURE — 3046F HEMOGLOBIN A1C LEVEL >9.0%: CPT | Performed by: NURSE PRACTITIONER

## 2024-01-08 PROCEDURE — 1123F ACP DISCUSS/DSCN MKR DOCD: CPT | Performed by: NURSE PRACTITIONER

## 2024-01-08 PROCEDURE — 87428 SARSCOV & INF VIR A&B AG IA: CPT | Performed by: NURSE PRACTITIONER

## 2024-01-08 PROCEDURE — 99213 OFFICE O/P EST LOW 20 MIN: CPT | Performed by: NURSE PRACTITIONER

## 2024-01-08 PROCEDURE — G8427 DOCREV CUR MEDS BY ELIG CLIN: HCPCS | Performed by: NURSE PRACTITIONER

## 2024-01-08 PROCEDURE — 3075F SYST BP GE 130 - 139MM HG: CPT | Performed by: NURSE PRACTITIONER

## 2024-01-08 PROCEDURE — G8417 CALC BMI ABV UP PARAM F/U: HCPCS | Performed by: NURSE PRACTITIONER

## 2024-01-08 PROCEDURE — 4004F PT TOBACCO SCREEN RCVD TLK: CPT | Performed by: NURSE PRACTITIONER

## 2024-01-08 RX ORDER — AMOXICILLIN 500 MG/1
500 CAPSULE ORAL 3 TIMES DAILY
Qty: 21 CAPSULE | Refills: 0 | Status: SHIPPED | OUTPATIENT
Start: 2024-01-08 | End: 2024-01-15

## 2024-01-08 ASSESSMENT — ENCOUNTER SYMPTOMS
HOARSE VOICE: 0
SINUS PRESSURE: 1
SHORTNESS OF BREATH: 0
SORE THROAT: 1
SWOLLEN GLANDS: 1
SINUS COMPLAINT: 1
COUGH: 0

## 2024-01-08 ASSESSMENT — PATIENT HEALTH QUESTIONNAIRE - PHQ9
1. LITTLE INTEREST OR PLEASURE IN DOING THINGS: 0
SUM OF ALL RESPONSES TO PHQ QUESTIONS 1-9: 0
1. LITTLE INTEREST OR PLEASURE IN DOING THINGS: NOT AT ALL
2. FEELING DOWN, DEPRESSED OR HOPELESS: 0
SUM OF ALL RESPONSES TO PHQ QUESTIONS 1-9: 0
SUM OF ALL RESPONSES TO PHQ9 QUESTIONS 1 & 2: 0
SUM OF ALL RESPONSES TO PHQ9 QUESTIONS 1 & 2: 0
SUM OF ALL RESPONSES TO PHQ QUESTIONS 1-9: 0
2. FEELING DOWN, DEPRESSED OR HOPELESS: NOT AT ALL
SUM OF ALL RESPONSES TO PHQ QUESTIONS 1-9: 0

## 2024-01-08 NOTE — PROGRESS NOTES
1/8/2024    Chief Complaint   Patient presents with    Head Congestion     Headache, runny nose        Greyson Odom is a 68 y.o. male, presents today for:      ASSESSMENT/PLAN:    1. Head congestion  Likely bacterial sinus infection/ pharyngitis due to unresolving symptoms  Negative POCT COVID/ Influenza  Start Amoxicillin  Encouraged OTC cough/ cold medication, Tylenol/ Ibuprofen PRN pain  Discussed non pharmacologic measures: Encouraged frequent water intake.  Encouraged Gatorade/ Powerade if not drinking fluids, intake of honey/ lemon tea for sore throat, Humidifier at night for cough, hot showers, Netti Pot if needed for congestion.    If no improvement, encouraged pt to call for F/U labs/ imaging    - POCT COVID-19 & Influenza A/B  - amoxicillin (AMOXIL) 500 MG capsule; Take 1 capsule by mouth 3 times daily for 7 days  Dispense: 21 capsule; Refill: 0    2. Sore throat  See above  - POCT COVID-19 & Influenza A/B  - amoxicillin (AMOXIL) 500 MG capsule; Take 1 capsule by mouth 3 times daily for 7 days  Dispense: 21 capsule; Refill: 0    3. Acute pharyngitis due to other specified organisms  See above    4. Chronic systolic (congestive) heart failure (HCC)  Encouraged monitoring of fluids  Discussed going to ER if develops chest pain    5. Atherosclerosis of native coronary artery of native heart with angina pectoris ([facility])  Continue care with Cardiology  Continue current meds  Discussed safe OTC meds    6. Type 2 diabetes mellitus without complication, without long-term current use of insulin ([facility])  Discussed sick day rules      No follow-ups on file.    Sinus Problem  This is a new problem. Episode onset: 7 days. The problem has been gradually worsening since onset. There has been no fever. The pain is moderate. Associated symptoms include congestion (head/ nasal), headaches (frontal), sinus pressure, sneezing, a sore throat and swollen glands. Pertinent negatives include no chills, coughing, diaphoresis,

## 2024-01-22 ENCOUNTER — OFFICE VISIT (OUTPATIENT)
Dept: PULMONOLOGY | Age: 69
End: 2024-01-22
Payer: MEDICARE

## 2024-01-22 ENCOUNTER — TELEPHONE (OUTPATIENT)
Dept: PULMONOLOGY | Age: 69
End: 2024-01-22

## 2024-01-22 VITALS
SYSTOLIC BLOOD PRESSURE: 144 MMHG | OXYGEN SATURATION: 98 % | HEART RATE: 65 BPM | DIASTOLIC BLOOD PRESSURE: 74 MMHG | BODY MASS INDEX: 24.92 KG/M2 | HEIGHT: 64 IN | WEIGHT: 146 LBS

## 2024-01-22 DIAGNOSIS — I10 ESSENTIAL HYPERTENSION: ICD-10-CM

## 2024-01-22 DIAGNOSIS — Z71.89 CPAP USE COUNSELING: ICD-10-CM

## 2024-01-22 DIAGNOSIS — G47.33 OSA (OBSTRUCTIVE SLEEP APNEA): Primary | ICD-10-CM

## 2024-01-22 PROCEDURE — G8484 FLU IMMUNIZE NO ADMIN: HCPCS | Performed by: NURSE PRACTITIONER

## 2024-01-22 PROCEDURE — G8427 DOCREV CUR MEDS BY ELIG CLIN: HCPCS | Performed by: NURSE PRACTITIONER

## 2024-01-22 PROCEDURE — 99213 OFFICE O/P EST LOW 20 MIN: CPT | Performed by: NURSE PRACTITIONER

## 2024-01-22 PROCEDURE — 3017F COLORECTAL CA SCREEN DOC REV: CPT | Performed by: NURSE PRACTITIONER

## 2024-01-22 PROCEDURE — 3077F SYST BP >= 140 MM HG: CPT | Performed by: NURSE PRACTITIONER

## 2024-01-22 PROCEDURE — 3078F DIAST BP <80 MM HG: CPT | Performed by: NURSE PRACTITIONER

## 2024-01-22 PROCEDURE — 1123F ACP DISCUSS/DSCN MKR DOCD: CPT | Performed by: NURSE PRACTITIONER

## 2024-01-22 PROCEDURE — G8417 CALC BMI ABV UP PARAM F/U: HCPCS | Performed by: NURSE PRACTITIONER

## 2024-01-22 PROCEDURE — 4004F PT TOBACCO SCREEN RCVD TLK: CPT | Performed by: NURSE PRACTITIONER

## 2024-01-22 ASSESSMENT — SLEEP AND FATIGUE QUESTIONNAIRES
HOW LIKELY ARE YOU TO NOD OFF OR FALL ASLEEP WHILE SITTING AND TALKING TO SOMEONE: 0
HOW LIKELY ARE YOU TO NOD OFF OR FALL ASLEEP WHILE SITTING QUIETLY AFTER LUNCH WITHOUT ALCOHOL: 2
ESS TOTAL SCORE: 8
HOW LIKELY ARE YOU TO NOD OFF OR FALL ASLEEP WHILE SITTING INACTIVE IN A PUBLIC PLACE: 2
HOW LIKELY ARE YOU TO NOD OFF OR FALL ASLEEP WHEN YOU ARE A PASSENGER IN A CAR FOR AN HOUR WITHOUT A BREAK: 0
HOW LIKELY ARE YOU TO NOD OFF OR FALL ASLEEP WHILE WATCHING TV: 0
HOW LIKELY ARE YOU TO NOD OFF OR FALL ASLEEP WHILE LYING DOWN TO REST IN THE AFTERNOON WHEN CIRCUMSTANCES PERMIT: 2
NECK CIRCUMFERENCE (INCHES): 14.5
HOW LIKELY ARE YOU TO NOD OFF OR FALL ASLEEP IN A CAR, WHILE STOPPED FOR A FEW MINUTES IN TRAFFIC: 0
HOW LIKELY ARE YOU TO NOD OFF OR FALL ASLEEP WHILE SITTING AND READING: 2

## 2024-01-22 NOTE — PATIENT INSTRUCTIONS
something boring until you feel sleepy. Sit quietly in the dark or read the warranty on your refrigerator. Don't expose yourself to bright light while you are up, it gives cues to your brain that it is time to wake up.  11- Only use your bed for sleeping: Don’t use the bed as an office, workroom or recreation room. Let your body \"know\" that the bed is associated with sleeping  12- Use comfortable bedding. Uncomfortable bedding can prevent good sleep. Evaluate whether or not this is a source of your problem, and make appropriate changes.  13- Make sure your bed and bedroom are quiet and comfortable: A hot room can be uncomfortable. A cooler room, along with enough blankets to stay warm is recommended. Get a blackout shade or wear a slumber mask and wear earplugs or get a \"white noise\" machine for light and noise distractions.   14- Use sunlight to set your biological clock: When you get up in the morning, go outside and turn your face to the sun for 15 minutes.  15- Don’t take your worries to bed: Leave worries about job, school, daily life, etc., behind when you go to bed. Some people find it useful to assign a \"worry period\" during the evening or afternoon for these issues.

## 2024-01-22 NOTE — PROGRESS NOTES
Patient ID: Greyson Odom is a 68 y.o. male who is being seen today for   Chief Complaint   Patient presents with    Follow-up     sleep         HPI:   Greyson Odom is a 68 y.o. male in office for ARGELIA follow up. States he has been doing pretty well with CPAP.  Patient is using CPAP   6-7 hrs/night. Using humidifier. No snoring on CPAP. The pressure is well tolerated. The mask is comfortable- nasal pillows with chinstrap. No mask leak. No significant daytime sleepiness. No nodding off when driving. No dry nose or throat. Minimal fatigue. Bedtime is 9-10 pm and rise time is 4-5 am. Sleep onset is few minutes. Wakes up 0 times at night total. No nocturia.  Occasional naps during the day. No headache in am. No weight gain. 6-7 caffienated beverages during the day. No alcohol. ESS is 8            1/22/2024    10:00 AM 1/27/2023     9:50 AM 1/28/2022    10:55 AM 1/26/2021     9:33 AM 2/11/2020    10:53 AM 11/5/2019     9:12 AM 6/28/2019     7:59 AM   Sleep Medicine   Sitting and reading 2 2 1 0 1 0 3   Watching TV 0 0 1 0 1 2 2   Sitting, inactive in a public place (e.g. a theatre or a meeting) 2 1 2 0 0 0 3   As a passenger in a car for an hour without a break 0 0 0 0 0 0 0   Lying down to rest in the afternoon when circumstances permit 2 2 2 1 1 3 2   Sitting and talking to someone 0 0 0 0 0 0 0   Sitting quietly after a lunch without alcohol 2 1 1 0 0 0 1   In a car, while stopped for a few minutes in traffic 0 0 0 0 0 0 0   [place] Sleepiness Score 8 6 7 1 3 5 11   Neck circumference (Inches) 14.5    14.5 14 14       Past Medical History:  Past Medical History:   Diagnosis Date    Abnormal echocardiogram 04/2019    Hyperlipidemia     Hypertension     Myocardiopathy (HCC) 3/2/2021    Prostate cancer (HCC) 2012    total prostatectomy,DrDilip    Sleep apnea     Smoker        Past Surgical History:        Procedure Laterality Date    CARDIAC CATHETERIZATION  04/23/2019    Non Obs CAD, medical management    COLONOSCOPY

## 2024-02-14 ENCOUNTER — OFFICE VISIT (OUTPATIENT)
Dept: FAMILY MEDICINE CLINIC | Age: 69
End: 2024-02-14
Payer: MEDICARE

## 2024-02-14 VITALS
SYSTOLIC BLOOD PRESSURE: 122 MMHG | HEART RATE: 82 BPM | BODY MASS INDEX: 25.58 KG/M2 | WEIGHT: 149 LBS | TEMPERATURE: 97.9 F | DIASTOLIC BLOOD PRESSURE: 82 MMHG | OXYGEN SATURATION: 98 %

## 2024-02-14 DIAGNOSIS — I10 ESSENTIAL HYPERTENSION: ICD-10-CM

## 2024-02-14 DIAGNOSIS — J40 BRONCHITIS: Primary | ICD-10-CM

## 2024-02-14 DIAGNOSIS — I49.3 PVC (PREMATURE VENTRICULAR CONTRACTION): ICD-10-CM

## 2024-02-14 DIAGNOSIS — E11.9 TYPE 2 DIABETES MELLITUS WITHOUT COMPLICATION, WITHOUT LONG-TERM CURRENT USE OF INSULIN (HCC): ICD-10-CM

## 2024-02-14 DIAGNOSIS — Z85.46 HISTORY OF MALIGNANT NEOPLASM OF PROSTATE: ICD-10-CM

## 2024-02-14 DIAGNOSIS — E78.5 HYPERLIPIDEMIA LDL GOAL <130: ICD-10-CM

## 2024-02-14 DIAGNOSIS — I50.22 CHRONIC SYSTOLIC (CONGESTIVE) HEART FAILURE (HCC): ICD-10-CM

## 2024-02-14 DIAGNOSIS — Z72.0 TOBACCO ABUSE: ICD-10-CM

## 2024-02-14 LAB
ALBUMIN SERPL-MCNC: 4.5 G/DL (ref 3.4–5)
ALBUMIN/GLOB SERPL: 2 {RATIO} (ref 1.1–2.2)
ALP SERPL-CCNC: 74 U/L (ref 40–129)
ALT SERPL-CCNC: 22 U/L (ref 10–40)
ANION GAP SERPL CALCULATED.3IONS-SCNC: 9 MMOL/L (ref 3–16)
AST SERPL-CCNC: 20 U/L (ref 15–37)
BASOPHILS # BLD: 0 K/UL (ref 0–0.2)
BASOPHILS NFR BLD: 0.8 %
BILIRUB SERPL-MCNC: <0.2 MG/DL (ref 0–1)
BUN SERPL-MCNC: 21 MG/DL (ref 7–20)
CALCIUM SERPL-MCNC: 9.4 MG/DL (ref 8.3–10.6)
CHLORIDE SERPL-SCNC: 101 MMOL/L (ref 99–110)
CHOLEST SERPL-MCNC: 161 MG/DL (ref 0–199)
CO2 SERPL-SCNC: 29 MMOL/L (ref 21–32)
CREAT SERPL-MCNC: 1 MG/DL (ref 0.8–1.3)
DEPRECATED RDW RBC AUTO: 17.8 % (ref 12.4–15.4)
EOSINOPHIL # BLD: 0.2 K/UL (ref 0–0.6)
EOSINOPHIL NFR BLD: 4.7 %
GFR SERPLBLD CREATININE-BSD FMLA CKD-EPI: >60 ML/MIN/{1.73_M2}
GLUCOSE SERPL-MCNC: 105 MG/DL (ref 70–99)
HCT VFR BLD AUTO: 41 % (ref 40.5–52.5)
HDLC SERPL-MCNC: 39 MG/DL (ref 40–60)
HGB BLD-MCNC: 13.4 G/DL (ref 13.5–17.5)
LDLC SERPL CALC-MCNC: 95 MG/DL
LYMPHOCYTES # BLD: 2 K/UL (ref 1–5.1)
LYMPHOCYTES NFR BLD: 39.6 %
MCH RBC QN AUTO: 26.1 PG (ref 26–34)
MCHC RBC AUTO-ENTMCNC: 32.8 G/DL (ref 31–36)
MCV RBC AUTO: 79.6 FL (ref 80–100)
MONOCYTES # BLD: 0.7 K/UL (ref 0–1.3)
MONOCYTES NFR BLD: 13.4 %
NEUTROPHILS # BLD: 2.1 K/UL (ref 1.7–7.7)
NEUTROPHILS NFR BLD: 41.5 %
PLATELET # BLD AUTO: 255 K/UL (ref 135–450)
PMV BLD AUTO: 8.3 FL (ref 5–10.5)
POTASSIUM SERPL-SCNC: 5.1 MMOL/L (ref 3.5–5.1)
PROT SERPL-MCNC: 6.7 G/DL (ref 6.4–8.2)
PSA SERPL DL<=0.01 NG/ML-MCNC: <0.01 NG/ML (ref 0–4)
RBC # BLD AUTO: 5.15 M/UL (ref 4.2–5.9)
SODIUM SERPL-SCNC: 139 MMOL/L (ref 136–145)
TRIGL SERPL-MCNC: 137 MG/DL (ref 0–150)
VLDLC SERPL CALC-MCNC: 27 MG/DL
WBC # BLD AUTO: 5 K/UL (ref 4–11)

## 2024-02-14 PROCEDURE — 3074F SYST BP LT 130 MM HG: CPT | Performed by: NURSE PRACTITIONER

## 2024-02-14 PROCEDURE — G8417 CALC BMI ABV UP PARAM F/U: HCPCS | Performed by: NURSE PRACTITIONER

## 2024-02-14 PROCEDURE — 1123F ACP DISCUSS/DSCN MKR DOCD: CPT | Performed by: NURSE PRACTITIONER

## 2024-02-14 PROCEDURE — 2022F DILAT RTA XM EVC RTNOPTHY: CPT | Performed by: NURSE PRACTITIONER

## 2024-02-14 PROCEDURE — G8427 DOCREV CUR MEDS BY ELIG CLIN: HCPCS | Performed by: NURSE PRACTITIONER

## 2024-02-14 PROCEDURE — 3079F DIAST BP 80-89 MM HG: CPT | Performed by: NURSE PRACTITIONER

## 2024-02-14 PROCEDURE — 4004F PT TOBACCO SCREEN RCVD TLK: CPT | Performed by: NURSE PRACTITIONER

## 2024-02-14 PROCEDURE — 99214 OFFICE O/P EST MOD 30 MIN: CPT | Performed by: NURSE PRACTITIONER

## 2024-02-14 PROCEDURE — 3046F HEMOGLOBIN A1C LEVEL >9.0%: CPT | Performed by: NURSE PRACTITIONER

## 2024-02-14 PROCEDURE — 36415 COLL VENOUS BLD VENIPUNCTURE: CPT | Performed by: NURSE PRACTITIONER

## 2024-02-14 PROCEDURE — G8484 FLU IMMUNIZE NO ADMIN: HCPCS | Performed by: NURSE PRACTITIONER

## 2024-02-14 PROCEDURE — 3017F COLORECTAL CA SCREEN DOC REV: CPT | Performed by: NURSE PRACTITIONER

## 2024-02-14 RX ORDER — PRAVASTATIN SODIUM 40 MG
TABLET ORAL
Qty: 90 TABLET | Refills: 3 | Status: SHIPPED | OUTPATIENT
Start: 2024-02-14

## 2024-02-14 RX ORDER — ISOSORBIDE MONONITRATE 30 MG/1
30 TABLET, EXTENDED RELEASE ORAL NIGHTLY
Qty: 90 TABLET | Refills: 3 | Status: SHIPPED | OUTPATIENT
Start: 2024-02-14

## 2024-02-14 RX ORDER — NADOLOL 20 MG/1
20 TABLET ORAL NIGHTLY
Qty: 90 TABLET | Refills: 3 | Status: SHIPPED | OUTPATIENT
Start: 2024-02-14

## 2024-02-14 RX ORDER — ALBUTEROL SULFATE 90 UG/1
2 AEROSOL, METERED RESPIRATORY (INHALATION) EVERY 6 HOURS PRN
Qty: 18 G | Refills: 3 | Status: SHIPPED | OUTPATIENT
Start: 2024-02-14

## 2024-02-14 RX ORDER — CETIRIZINE HYDROCHLORIDE 10 MG/1
10 TABLET ORAL DAILY
Qty: 30 TABLET | Refills: 0 | Status: SHIPPED | OUTPATIENT
Start: 2024-02-14 | End: 2024-03-15

## 2024-02-14 RX ORDER — AZITHROMYCIN 250 MG/1
250 TABLET, FILM COATED ORAL SEE ADMIN INSTRUCTIONS
Qty: 6 TABLET | Refills: 0 | Status: SHIPPED | OUTPATIENT
Start: 2024-02-14 | End: 2024-02-19

## 2024-02-14 RX ORDER — HYDROCHLOROTHIAZIDE 25 MG/1
TABLET ORAL
Qty: 90 TABLET | Refills: 3 | Status: SHIPPED | OUTPATIENT
Start: 2024-02-14

## 2024-02-14 SDOH — ECONOMIC STABILITY: INCOME INSECURITY: HOW HARD IS IT FOR YOU TO PAY FOR THE VERY BASICS LIKE FOOD, HOUSING, MEDICAL CARE, AND HEATING?: NOT HARD AT ALL

## 2024-02-14 SDOH — ECONOMIC STABILITY: FOOD INSECURITY: WITHIN THE PAST 12 MONTHS, THE FOOD YOU BOUGHT JUST DIDN'T LAST AND YOU DIDN'T HAVE MONEY TO GET MORE.: NEVER TRUE

## 2024-02-14 SDOH — ECONOMIC STABILITY: FOOD INSECURITY: WITHIN THE PAST 12 MONTHS, YOU WORRIED THAT YOUR FOOD WOULD RUN OUT BEFORE YOU GOT MONEY TO BUY MORE.: NEVER TRUE

## 2024-02-14 ASSESSMENT — PATIENT HEALTH QUESTIONNAIRE - PHQ9
5. POOR APPETITE OR OVEREATING: 0
4. FEELING TIRED OR HAVING LITTLE ENERGY: 0
SUM OF ALL RESPONSES TO PHQ QUESTIONS 1-9: 0
2. FEELING DOWN, DEPRESSED OR HOPELESS: 0
7. TROUBLE CONCENTRATING ON THINGS, SUCH AS READING THE NEWSPAPER OR WATCHING TELEVISION: 0
1. LITTLE INTEREST OR PLEASURE IN DOING THINGS: 0
SUM OF ALL RESPONSES TO PHQ QUESTIONS 1-9: 0
SUM OF ALL RESPONSES TO PHQ QUESTIONS 1-9: 0
10. IF YOU CHECKED OFF ANY PROBLEMS, HOW DIFFICULT HAVE THESE PROBLEMS MADE IT FOR YOU TO DO YOUR WORK, TAKE CARE OF THINGS AT HOME, OR GET ALONG WITH OTHER PEOPLE: 0
9. THOUGHTS THAT YOU WOULD BE BETTER OFF DEAD, OR OF HURTING YOURSELF: 0
3. TROUBLE FALLING OR STAYING ASLEEP: 0
SUM OF ALL RESPONSES TO PHQ9 QUESTIONS 1 & 2: 0
SUM OF ALL RESPONSES TO PHQ QUESTIONS 1-9: 0
8. MOVING OR SPEAKING SO SLOWLY THAT OTHER PEOPLE COULD HAVE NOTICED. OR THE OPPOSITE, BEING SO FIGETY OR RESTLESS THAT YOU HAVE BEEN MOVING AROUND A LOT MORE THAN USUAL: 0
6. FEELING BAD ABOUT YOURSELF - OR THAT YOU ARE A FAILURE OR HAVE LET YOURSELF OR YOUR FAMILY DOWN: 0

## 2024-02-15 NOTE — RESULT ENCOUNTER NOTE
Mild anemia= watch for now    Normal kidney/ liver function.     Normal prostate level.     Normal cholesterol level.

## 2024-02-20 ASSESSMENT — ENCOUNTER SYMPTOMS
SINUS PRESSURE: 0
SORE THROAT: 0
SINUS PAIN: 0
TROUBLE SWALLOWING: 0
DIARRHEA: 0
CHEST TIGHTNESS: 0
BLOOD IN STOOL: 0
COUGH: 1
RHINORRHEA: 0
SHORTNESS OF BREATH: 1
FACIAL SWELLING: 0
CONSTIPATION: 0

## 2024-02-29 ENCOUNTER — OFFICE VISIT (OUTPATIENT)
Dept: FAMILY MEDICINE CLINIC | Age: 69
End: 2024-02-29
Payer: MEDICARE

## 2024-02-29 VITALS
BODY MASS INDEX: 25.23 KG/M2 | DIASTOLIC BLOOD PRESSURE: 78 MMHG | OXYGEN SATURATION: 98 % | TEMPERATURE: 97.5 F | SYSTOLIC BLOOD PRESSURE: 138 MMHG | WEIGHT: 147 LBS | HEART RATE: 53 BPM

## 2024-02-29 DIAGNOSIS — R05.8 COUGH PRESENT FOR GREATER THAN 3 WEEKS: Primary | ICD-10-CM

## 2024-02-29 DIAGNOSIS — I25.119 ATHEROSCLEROSIS OF NATIVE CORONARY ARTERY OF NATIVE HEART WITH ANGINA PECTORIS (HCC): ICD-10-CM

## 2024-02-29 DIAGNOSIS — Z72.0 TOBACCO ABUSE: ICD-10-CM

## 2024-02-29 DIAGNOSIS — I50.22 CHRONIC SYSTOLIC (CONGESTIVE) HEART FAILURE (HCC): ICD-10-CM

## 2024-02-29 DIAGNOSIS — I49.3 PVC (PREMATURE VENTRICULAR CONTRACTION): ICD-10-CM

## 2024-02-29 LAB
ALBUMIN SERPL-MCNC: 4.5 G/DL (ref 3.4–5)
ALBUMIN/GLOB SERPL: 2 {RATIO} (ref 1.1–2.2)
ALP SERPL-CCNC: 75 U/L (ref 40–129)
ALT SERPL-CCNC: 28 U/L (ref 10–40)
ANION GAP SERPL CALCULATED.3IONS-SCNC: 10 MMOL/L (ref 3–16)
AST SERPL-CCNC: 29 U/L (ref 15–37)
BILIRUB SERPL-MCNC: <0.2 MG/DL (ref 0–1)
BUN SERPL-MCNC: 24 MG/DL (ref 7–20)
CALCIUM SERPL-MCNC: 9.8 MG/DL (ref 8.3–10.6)
CHLORIDE SERPL-SCNC: 101 MMOL/L (ref 99–110)
CO2 SERPL-SCNC: 28 MMOL/L (ref 21–32)
CREAT SERPL-MCNC: 1 MG/DL (ref 0.8–1.3)
DEPRECATED RDW RBC AUTO: 18.4 % (ref 12.4–15.4)
GFR SERPLBLD CREATININE-BSD FMLA CKD-EPI: >60 ML/MIN/{1.73_M2}
GLUCOSE SERPL-MCNC: 91 MG/DL (ref 70–99)
HCT VFR BLD AUTO: 35.2 % (ref 40.5–52.5)
HGB BLD-MCNC: 11.7 G/DL (ref 13.5–17.5)
MCH RBC QN AUTO: 26.6 PG (ref 26–34)
MCHC RBC AUTO-ENTMCNC: 33.4 G/DL (ref 31–36)
MCV RBC AUTO: 79.7 FL (ref 80–100)
PLATELET # BLD AUTO: 281 K/UL (ref 135–450)
PMV BLD AUTO: 8 FL (ref 5–10.5)
POTASSIUM SERPL-SCNC: 4.5 MMOL/L (ref 3.5–5.1)
PROT SERPL-MCNC: 6.8 G/DL (ref 6.4–8.2)
RBC # BLD AUTO: 4.41 M/UL (ref 4.2–5.9)
SODIUM SERPL-SCNC: 139 MMOL/L (ref 136–145)
WBC # BLD AUTO: 5.3 K/UL (ref 4–11)

## 2024-02-29 PROCEDURE — 1123F ACP DISCUSS/DSCN MKR DOCD: CPT | Performed by: NURSE PRACTITIONER

## 2024-02-29 PROCEDURE — 3075F SYST BP GE 130 - 139MM HG: CPT | Performed by: NURSE PRACTITIONER

## 2024-02-29 PROCEDURE — 4004F PT TOBACCO SCREEN RCVD TLK: CPT | Performed by: NURSE PRACTITIONER

## 2024-02-29 PROCEDURE — G8417 CALC BMI ABV UP PARAM F/U: HCPCS | Performed by: NURSE PRACTITIONER

## 2024-02-29 PROCEDURE — 3017F COLORECTAL CA SCREEN DOC REV: CPT | Performed by: NURSE PRACTITIONER

## 2024-02-29 PROCEDURE — 99214 OFFICE O/P EST MOD 30 MIN: CPT | Performed by: NURSE PRACTITIONER

## 2024-02-29 PROCEDURE — 93000 ELECTROCARDIOGRAM COMPLETE: CPT | Performed by: NURSE PRACTITIONER

## 2024-02-29 PROCEDURE — 3078F DIAST BP <80 MM HG: CPT | Performed by: NURSE PRACTITIONER

## 2024-02-29 PROCEDURE — G8484 FLU IMMUNIZE NO ADMIN: HCPCS | Performed by: NURSE PRACTITIONER

## 2024-02-29 PROCEDURE — G8427 DOCREV CUR MEDS BY ELIG CLIN: HCPCS | Performed by: NURSE PRACTITIONER

## 2024-02-29 RX ORDER — BUDESONIDE AND FORMOTEROL FUMARATE DIHYDRATE 160; 4.5 UG/1; UG/1
2 AEROSOL RESPIRATORY (INHALATION) 2 TIMES DAILY
Qty: 30.6 G | Refills: 1 | Status: SHIPPED | OUTPATIENT
Start: 2024-02-29 | End: 2024-03-01 | Stop reason: CLARIF

## 2024-02-29 RX ORDER — METHYLPREDNISOLONE 4 MG/1
TABLET ORAL
Qty: 1 KIT | Refills: 0 | Status: SHIPPED | OUTPATIENT
Start: 2024-02-29 | End: 2024-03-06

## 2024-02-29 NOTE — PROGRESS NOTES
2/29/2024    Chief Complaint   Patient presents with    Bronchitis     2 wk fu states he still has the cough        Greyson Odom is a 68 y.o. male, presents today for:      ASSESSMENT/PLAN:  1. Cough present for greater than 3 weeks  --No improvement with Amoxicillin (given 1/ 2024), Azithromycin (given 2/2024).  Will given Medrol Dose Jimmy today and start Symbicort  --CT Chest W Contrast ordered today to screen for Lung Cancer/ Pulmonary nodules.  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    Updated labs ordered today due to new onset anemia on labs 2/2024  --Differentials also include possible arrythmia- will place new referral to Cardio at [facility] as patient has not seen general Cardio in 2 years and EP Cardio ([facility]) in 1 year.  Continue Nadolol and Isosorbide for now.  ECG showing SB with marked bradycardia- Nadolol decreased today  --Consider COPD w/u with PFT pending test results  - CT CHEST W CONTRAST; Future  - budesonide-formoterol (SYMBICORT) 160-4.5 MCG/ACT AERO; Inhale 2 puffs into the lungs 2 times daily  Dispense: 30.6 g; Refill: 1  - CBC  - Comprehensive Metabolic Panel  - methylPREDNISolone (MEDROL, JIMMY,) 4 MG tablet; Take by mouth.  Dispense: 1 kit; Refill: 0    2. Atherosclerosis of native coronary artery of native heart with angina pectoris (HCC)  See above  Decrease Nadolol 10, HCTZ 25, Isosorbide 30.  Consider switching to Nadolol to Metoprolol next OV  Continue Pravastatin 40, , Niacin 500  Continue care with Cardio, appreciate assistance.  Needs to make updated appt with EP and general Cardio  Encouraged heart healthy diet  Encouraged 30 min activity daily  Continue CPAP 10 cm H20- mild ARGELIA.  - CBC  - Comprehensive Metabolic Panel  - EKG 12 Lead  [facility]    3. PVC (premature ventricular contraction)  See above  - EKG 12 Lead  [facility]    4. Chronic systolic (congestive) heart

## 2024-03-01 DIAGNOSIS — R05.8 COUGH PRESENT FOR GREATER THAN 3 WEEKS: Primary | ICD-10-CM

## 2024-03-01 DIAGNOSIS — Z72.0 TOBACCO ABUSE: ICD-10-CM

## 2024-03-01 RX ORDER — FLUTICASONE PROPIONATE AND SALMETEROL XINAFOATE 45; 21 UG/1; UG/1
2 AEROSOL, METERED RESPIRATORY (INHALATION) 2 TIMES DAILY
Qty: 1 EACH | Refills: 5 | Status: CANCELLED | OUTPATIENT
Start: 2024-03-01

## 2024-03-01 RX ORDER — FLUTICASONE FUROATE AND VILANTEROL 100; 25 UG/1; UG/1
1 POWDER RESPIRATORY (INHALATION) DAILY
Qty: 1 EACH | Refills: 0 | Status: SHIPPED | OUTPATIENT
Start: 2024-03-01 | End: 2024-03-01

## 2024-03-01 RX ORDER — FLUTICASONE PROPIONATE AND SALMETEROL XINAFOATE 115; 21 UG/1; UG/1
2 AEROSOL, METERED RESPIRATORY (INHALATION) 2 TIMES DAILY
Qty: 1 EACH | Refills: 5 | OUTPATIENT
Start: 2024-03-01

## 2024-03-01 RX ORDER — FLUTICASONE PROPIONATE AND SALMETEROL XINAFOATE 115; 21 UG/1; UG/1
2 AEROSOL, METERED RESPIRATORY (INHALATION) 2 TIMES DAILY
Qty: 1 EACH | Refills: 3 | Status: SHIPPED | OUTPATIENT
Start: 2024-03-01

## 2024-03-01 NOTE — TELEPHONE ENCOUNTER
I would prefer Breo so I sent that but will accept Advair if it is not covered.  It would be the 250/50 2 puffs BID

## 2024-03-01 NOTE — TELEPHONE ENCOUNTER
I spoke to Ziyad pharmacist and he said the Breo is costing $350 because he has a high deductible and the ins does not cover the advair diskus it will cover the HFA and the 45/21 would be $175, and the 115/21 would be around $190 you can send which ever you want I qued them both up for you

## 2024-03-04 NOTE — RESULT ENCOUNTER NOTE
Worsening anemia since last lab draw.  Lets see what scan shows that you have scheduled on 3/6 and then discuss further.  Kidney/ liver function is normal.

## 2024-03-06 ENCOUNTER — HOSPITAL ENCOUNTER (OUTPATIENT)
Dept: CT IMAGING | Age: 69
Discharge: HOME | End: 2024-03-06
Payer: MEDICARE

## 2024-03-06 ENCOUNTER — HOSPITAL ENCOUNTER (OUTPATIENT)
Age: 69
Discharge: HOME | End: 2024-03-06
Payer: MEDICARE

## 2024-03-06 DIAGNOSIS — Z72.0 TOBACCO ABUSE: ICD-10-CM

## 2024-03-06 DIAGNOSIS — R05.8 COUGH PRESENT FOR GREATER THAN 3 WEEKS: ICD-10-CM

## 2024-03-06 PROCEDURE — 71260 CT THORAX DX C+: CPT

## 2024-03-06 PROCEDURE — 6360000004 HC RX CONTRAST MEDICATION: Performed by: NURSE PRACTITIONER

## 2024-03-06 RX ADMIN — IOMEPROL INJECTION 75 ML: 714 INJECTION, SOLUTION INTRAVASCULAR at 15:57

## 2024-03-07 NOTE — RESULT ENCOUNTER NOTE
Reassuring findings on CT scan of chest.  No cancer.  No other cause for persistent cough though.  So we continue with the same plan- see Cardio, try a different inhaler and see if that will get things better.

## 2024-03-29 NOTE — PROGRESS NOTES
PROSTATE BIOPSY      PROSTATECTOMY  02/14/2012       Objective:   BP (!) 144/70   Pulse 50   Ht 1.626 m (5' 4.02\")   Wt 66.9 kg (147 lb 6.4 oz)   SpO2 100%   BMI 25.29 kg/m²     Wt Readings from Last 3 Encounters:   04/03/24 66.9 kg (147 lb 6.4 oz)   02/29/24 66.7 kg (147 lb)   02/14/24 67.6 kg (149 lb)       Physical Exam:  General: No Respiratory distress, appears well developed and well nourished.   Eyes:  Sclera nonicteric  Nose/Sinuses:  negative findings: nose shows no deformity, asymmetry, or inflammation, nasal mucosa normal, septum midline with no perforation or bleeding  Back:  no pain to palpation  Joint:  no active joint inflammation  Musculoskeletal:  negative  Skin:  Warm and dry  Neck:  Negative for JVD and Carotid Bruits.   Chest:  Clear to auscultation, respiration easy  Cardiovascular:  52 bpm RRR, S1S2 normal, no murmur, no rub or thrill.  Abdomen:  Soft normal liver and spleen  Extremities:   No edema, clubbing, cyanosis,  Pulses: Femoral and pedal pulses are normal.  Neuro: intact    Medications:   Outpatient Encounter Medications as of 4/3/2024   Medication Sig Dispense Refill    fluticasone-salmeterol (ADVAIR HFA) 115-21 MCG/ACT inhaler Inhale 2 puffs into the lungs 2 times daily 1 each 3    hydroCHLOROthiazide (HYDRODIURIL) 25 MG tablet TAKE ONE TABLET BY MOUTH DAILY 90 tablet 3    isosorbide mononitrate (IMDUR) 30 MG extended release tablet Take 1 tablet by mouth at bedtime TAKE ONE TABLET BY MOUTH DAILY 90 tablet 3    pravastatin (PRAVACHOL) 40 MG tablet TAKE ONE TABLET BY MOUTH DAILY 90 tablet 3    albuterol sulfate HFA (PROVENTIL HFA) 108 (90 Base) MCG/ACT inhaler Inhale 2 puffs into the lungs every 6 hours as needed for Wheezing 18 g 3    aspirin 325 MG tablet Take 1 tablet by mouth daily      Multiple Vitamin (MULTIVITAMIN ADULT PO) Take 1 tablet by mouth daily      niacin 500 MG tablet Take 1 tablet by mouth daily      [DISCONTINUED] nadolol (CORGARD) 20 MG tablet Take 1 tablet by 
TAKE ONE TABLET BY MOUTH DAILY 90 tablet 3    albuterol sulfate HFA (PROVENTIL HFA) 108 (90 Base) MCG/ACT inhaler Inhale 2 puffs into the lungs every 6 hours as needed for Wheezing 18 g 3    aspirin 325 MG tablet Take 1 tablet by mouth daily      Multiple Vitamin (MULTIVITAMIN ADULT PO) Take 1 tablet by mouth daily      niacin 500 MG tablet Take 1 tablet by mouth daily       No facility-administered encounter medications on file as of 4/3/2024.        Lab Data:  Lab Results   Component Value Date     02/29/2024    K 4.5 02/29/2024     02/29/2024    CO2 28 02/29/2024    BUN 24 (H) 02/29/2024    CREATININE 1.0 02/29/2024    GLUCOSE 91 02/29/2024    CALCIUM 9.8 02/29/2024    PROT 6.8 02/29/2024    LABALBU 4.5 02/29/2024    BILITOT <0.2 02/29/2024    ALKPHOS 75 02/29/2024    AST 29 02/29/2024    ALT 28 02/29/2024    LABGLOM >60 02/29/2024    GFRAA >60 08/11/2022    AGRATIO 2.0 02/29/2024    GLOB 2.4 02/23/2021     Lab Results   Component Value Date    WBC 5.3 02/29/2024    HGB 11.7 (L) 02/29/2024    HCT 35.2 (L) 02/29/2024    MCV 79.7 (L) 02/29/2024     02/29/2024     Lab Results   Component Value Date    TSHFT4 0.38 03/25/2019   LIPID:   Lab Results   Component Value Date    CHOL 161 02/14/2024    CHOL 116 08/14/2023    CHOL 161 02/10/2023     Lab Results   Component Value Date    TRIG 137 02/14/2024    TRIG 82 08/14/2023    TRIG 107 02/10/2023     Lab Results   Component Value Date    HDL 39 (L) 02/14/2024    HDL 36 (L) 08/14/2023    HDL 43 02/10/2023     Lab Results   Component Value Date    LDLCALC 95 02/14/2024    LDLCALC 64 08/14/2023    LDLCALC 97 02/10/2023     A1C:   Lab Results   Component Value Date    LABA1C 6.1 08/14/2023       IMAGING:   I have reviewed the following tests and documented in this encounter as follows and reviewed with the patient.    EKG 2/29/2024  Marked sinus Bradycardia   BORDERLINE RHYTHM 46 BPM    24 HR monitor 11/2019  11.7% PVC    ECHO 8/1/2019  EF=55%; wall motion 
no

## 2024-04-03 ENCOUNTER — OFFICE VISIT (OUTPATIENT)
Dept: CARDIOLOGY CLINIC | Age: 69
End: 2024-04-03
Payer: MEDICARE

## 2024-04-03 ENCOUNTER — NURSE ONLY (OUTPATIENT)
Dept: FAMILY MEDICINE CLINIC | Age: 69
End: 2024-04-03
Payer: MEDICARE

## 2024-04-03 VITALS
BODY MASS INDEX: 25.16 KG/M2 | DIASTOLIC BLOOD PRESSURE: 70 MMHG | WEIGHT: 147.4 LBS | SYSTOLIC BLOOD PRESSURE: 144 MMHG | OXYGEN SATURATION: 100 % | HEIGHT: 64 IN | HEART RATE: 50 BPM

## 2024-04-03 DIAGNOSIS — Z79.899 MEDICATION MANAGEMENT: ICD-10-CM

## 2024-04-03 DIAGNOSIS — I10 ESSENTIAL HYPERTENSION: ICD-10-CM

## 2024-04-03 DIAGNOSIS — R00.1 BRADYCARDIA: Primary | ICD-10-CM

## 2024-04-03 DIAGNOSIS — I25.10 CORONARY ARTERY DISEASE INVOLVING NATIVE CORONARY ARTERY OF NATIVE HEART WITHOUT ANGINA PECTORIS: ICD-10-CM

## 2024-04-03 DIAGNOSIS — I49.3 PVC (PREMATURE VENTRICULAR CONTRACTION): ICD-10-CM

## 2024-04-03 DIAGNOSIS — E78.00 HYPERCHOLESTEROLEMIA: ICD-10-CM

## 2024-04-03 DIAGNOSIS — F17.200 SMOKER: ICD-10-CM

## 2024-04-03 PROBLEM — I25.119 ATHEROSCLEROSIS OF NATIVE CORONARY ARTERY OF NATIVE HEART WITH ANGINA PECTORIS (HCC): Status: RESOLVED | Noted: 2023-02-10 | Resolved: 2024-04-03

## 2024-04-03 PROBLEM — I25.119 ATHEROSCLEROTIC HEART DISEASE OF NATIVE CORONARY ARTERY WITH UNSPECIFIED ANGINA PECTORIS (HCC): Status: ACTIVE | Noted: 2024-04-03

## 2024-04-03 PROBLEM — J44.9 CHRONIC OBSTRUCTIVE PULMONARY DISEASE, UNSPECIFIED (HCC): Status: ACTIVE | Noted: 2024-04-03

## 2024-04-03 PROBLEM — I50.22 CHRONIC SYSTOLIC (CONGESTIVE) HEART FAILURE (HCC): Status: RESOLVED | Noted: 2022-08-11 | Resolved: 2024-04-03

## 2024-04-03 PROCEDURE — 99204 OFFICE O/P NEW MOD 45 MIN: CPT | Performed by: INTERNAL MEDICINE

## 2024-04-03 PROCEDURE — 3017F COLORECTAL CA SCREEN DOC REV: CPT | Performed by: INTERNAL MEDICINE

## 2024-04-03 PROCEDURE — 36415 COLL VENOUS BLD VENIPUNCTURE: CPT | Performed by: INTERNAL MEDICINE

## 2024-04-03 PROCEDURE — 4004F PT TOBACCO SCREEN RCVD TLK: CPT | Performed by: INTERNAL MEDICINE

## 2024-04-03 PROCEDURE — 3078F DIAST BP <80 MM HG: CPT | Performed by: INTERNAL MEDICINE

## 2024-04-03 PROCEDURE — G8427 DOCREV CUR MEDS BY ELIG CLIN: HCPCS | Performed by: INTERNAL MEDICINE

## 2024-04-03 PROCEDURE — 1123F ACP DISCUSS/DSCN MKR DOCD: CPT | Performed by: INTERNAL MEDICINE

## 2024-04-03 PROCEDURE — 3077F SYST BP >= 140 MM HG: CPT | Performed by: INTERNAL MEDICINE

## 2024-04-03 PROCEDURE — G8417 CALC BMI ABV UP PARAM F/U: HCPCS | Performed by: INTERNAL MEDICINE

## 2024-04-03 RX ORDER — NADOLOL 20 MG/1
10 TABLET ORAL NIGHTLY
Qty: 90 TABLET | Refills: 3 | Status: CANCELLED
Start: 2024-04-03

## 2024-04-03 NOTE — PATIENT INSTRUCTIONS
Plan:  Medications reviewed in office. Medications refilled as warranted  Labs reviewed in epic and discussed with patient.  Stop nadolol 10mg   48 holter monitor- call if you are having any symptoms. schedule the monitor for next week    Obtain TSH blood work     Your provider has ordered testing for further evaluation.  An order/prescription has been included in your paper work.   To schedule outpatient testing, contact Central Scheduling by calling [address] [place] [address] ([phone]).    Follow up in 2 months

## 2024-04-04 ENCOUNTER — TELEPHONE (OUTPATIENT)
Dept: CARDIOLOGY CLINIC | Age: 69
End: 2024-04-04

## 2024-04-04 LAB — TSH SERPL DL<=0.005 MIU/L-ACNC: 0.43 UIU/ML (ref 0.27–4.2)

## 2024-04-04 NOTE — TELEPHONE ENCOUNTER
LMOVM (per HIPAA) with results.      Raffi Mathis MD  [facility] Practice Staff  Thyroid levels are ok

## 2024-04-10 ENCOUNTER — HOSPITAL ENCOUNTER (OUTPATIENT)
Dept: NON INVASIVE DIAGNOSTICS | Age: 69
Discharge: HOME | End: 2024-04-10
Payer: MEDICARE

## 2024-04-10 DIAGNOSIS — R00.1 BRADYCARDIA: ICD-10-CM

## 2024-04-10 DIAGNOSIS — I49.3 PVC (PREMATURE VENTRICULAR CONTRACTION): ICD-10-CM

## 2024-04-10 PROCEDURE — 93225 XTRNL ECG REC<48 HRS REC: CPT

## 2024-04-10 PROCEDURE — 93226 XTRNL ECG REC<48 HR SCAN A/R: CPT

## 2024-04-10 NOTE — PROGRESS NOTES
48 HOUR HOLTER MONITOR APPLIED WITHOUT COMPLICATIONS.  PT. VERBALLY UNDERSTANDS INSTRUCTIONS GIVEN.  UNIT: B  TIME: 7:40

## 2024-04-16 ENCOUNTER — TELEPHONE (OUTPATIENT)
Dept: CARDIOLOGY CLINIC | Age: 69
End: 2024-04-16

## 2024-04-16 LAB
ACQUISITION DURATION: NORMAL S
AVERAGE HEART RATE: 81 BPM
EKG DIAGNOSIS: NORMAL
HOLTER MAX HEART RATE: 124 BPM
HOOKUP DATE: NORMAL
HOOKUP TIME: NORMAL
MAX HEART RATE TIME/DATE: NORMAL
MIN HEART RATE TIME/DATE: NORMAL
MIN HEART RATE: 58 BPM
NUMBER OF QRS COMPLEXES: NORMAL
NUMBER OF SUPRAVENTRICULAR COUPLETS: 57
NUMBER OF SUPRAVENTRICULAR ECTOPICS: 255
NUMBER OF SUPRAVENTRICULAR ISOLATED BEATS: 52
NUMBER OF VENTRICULAR BIGEMINAL CYCLES: 0
NUMBER OF VENTRICULAR COUPLETS: 1
NUMBER OF VENTRICULAR ECTOPICS: 250

## 2024-04-16 NOTE — TELEPHONE ENCOUNTER
----- Message from Raffi Mathis MD sent at 4/16/2024  9:26 AM EDT -----  Heart rate is good no significant arrhythmia   stay off nadolol follow up inoffice.  ----- Message -----  From: Jahaira Zhong Incoming Muse Results  Sent: 4/16/2024   7:31 AM EDT  To: Raffi Mathis MD

## 2024-05-20 NOTE — PROGRESS NOTES
[facility] office follow up Note  6/3/2024     Subjective:  Mr. Odom is here today for cardiac care. PMH of nonobstructive CAD, PVC, HTN, High cholesterol,  SB. He is being referred by Yas Jain APRN - CNP He is farmer cattle and crops.  He smokes 5 cigarettes a day  No cardiac complaints today.     HPI:    LOV 4/3/24 nadolol was stopped for bradycardia.  48 hour monitor 4/10/24 sinus rhythm with sinus arrhythmia. Occasional PAC's, PVC's, three short 3 beat runs PAT   Today, 6/3/24 he reports he is feeling good.  He does not feeling any racing or slow heart rate or symptoms associated with it.  He has not had any change in his energy level.  He is active everyday as a grain, hay and .  He has 60 new calves this spring.  Patient denies current edema, chest pain, shortness of breath, palpitations, dizziness or syncope.  Patient is taking all cardiac medications as prescribed and tolerates them well.        PMH  Nonobstructive CAD, PVC, HTN, HLD, CHF, SB, ARGELIA, tobacco use, Prostate CA.  He is a grain, hay, and .    Review of Systems:  12 point ROS negative in all areas as listed below except as in [place]  Constitutional, EENT, GI, , Musculoskeletal, skin, neurological, hematological, endocrine, Psychiatric    Reviewed past medical history, social, and family history.   He reports he smokes 5 cigarettes a day. Denies illict drug use. Denies alcohol use. Family history of dad having heart disease.     Past Medical History:   Diagnosis Date    Abnormal echocardiogram 04/2019    Hyperlipidemia     Hypertension     Myocardiopathy (HCC) 3/2/2021    Prostate cancer (HCC) 2012    total prostatectomy,Naeem    Sleep apnea     Smoker      Past Surgical History:   Procedure Laterality Date    CARDIAC CATHETERIZATION  04/23/2019    Non Obs CAD, medical management    COLONOSCOPY  03/22/2012    COLONOSCOPY  04/19/2023    COLONOSCOPY WITH BIOPSY    COLONOSCOPY N/A 4/19/2023    COLONOSCOPY

## 2024-05-28 DIAGNOSIS — J40 BRONCHITIS: ICD-10-CM

## 2024-05-28 RX ORDER — AZITHROMYCIN 250 MG/1
TABLET, FILM COATED ORAL
Qty: 6 TABLET | Refills: 0 | OUTPATIENT
Start: 2024-05-28

## 2024-05-28 RX ORDER — CETIRIZINE HYDROCHLORIDE 10 MG/1
10 TABLET ORAL DAILY
Qty: 30 TABLET | Refills: 0 | Status: SHIPPED | OUTPATIENT
Start: 2024-05-28 | End: 2024-06-27

## 2024-06-03 ENCOUNTER — OFFICE VISIT (OUTPATIENT)
Dept: CARDIOLOGY CLINIC | Age: 69
End: 2024-06-03
Payer: MEDICARE

## 2024-06-03 VITALS
DIASTOLIC BLOOD PRESSURE: 74 MMHG | HEART RATE: 60 BPM | BODY MASS INDEX: 25.03 KG/M2 | OXYGEN SATURATION: 93 % | WEIGHT: 146.6 LBS | SYSTOLIC BLOOD PRESSURE: 120 MMHG | HEIGHT: 64 IN

## 2024-06-03 DIAGNOSIS — G47.33 OSA (OBSTRUCTIVE SLEEP APNEA): ICD-10-CM

## 2024-06-03 DIAGNOSIS — E78.00 HYPERCHOLESTEROLEMIA: ICD-10-CM

## 2024-06-03 DIAGNOSIS — I10 ESSENTIAL HYPERTENSION: Primary | ICD-10-CM

## 2024-06-03 DIAGNOSIS — I25.85 CHRONIC CORONARY MICROVASCULAR DYSFUNCTION: ICD-10-CM

## 2024-06-03 DIAGNOSIS — Z72.0 TOBACCO ABUSE: ICD-10-CM

## 2024-06-03 DIAGNOSIS — I49.3 PVC (PREMATURE VENTRICULAR CONTRACTION): ICD-10-CM

## 2024-06-03 PROCEDURE — 4004F PT TOBACCO SCREEN RCVD TLK: CPT | Performed by: INTERNAL MEDICINE

## 2024-06-03 PROCEDURE — 1123F ACP DISCUSS/DSCN MKR DOCD: CPT | Performed by: INTERNAL MEDICINE

## 2024-06-03 PROCEDURE — 3074F SYST BP LT 130 MM HG: CPT | Performed by: INTERNAL MEDICINE

## 2024-06-03 PROCEDURE — G8427 DOCREV CUR MEDS BY ELIG CLIN: HCPCS | Performed by: INTERNAL MEDICINE

## 2024-06-03 PROCEDURE — 3017F COLORECTAL CA SCREEN DOC REV: CPT | Performed by: INTERNAL MEDICINE

## 2024-06-03 PROCEDURE — 3078F DIAST BP <80 MM HG: CPT | Performed by: INTERNAL MEDICINE

## 2024-06-03 PROCEDURE — G8417 CALC BMI ABV UP PARAM F/U: HCPCS | Performed by: INTERNAL MEDICINE

## 2024-06-03 PROCEDURE — 99214 OFFICE O/P EST MOD 30 MIN: CPT | Performed by: INTERNAL MEDICINE

## 2024-06-03 NOTE — PATIENT INSTRUCTIONS
Plan:  Medications reviewed in office. Medications refilled as warranted  Labs reviewed in epic and discussed with patient.  You can call 1-800 Quit Now for free resources to help you quit smoking.    It is ok if you decrease your aspirin to 81 mg daily    Follow up with me in 7-8 months

## 2024-08-27 DIAGNOSIS — J40 BRONCHITIS: ICD-10-CM

## 2024-08-27 RX ORDER — CETIRIZINE HYDROCHLORIDE 10 MG/1
10 TABLET ORAL DAILY
Qty: 30 TABLET | Refills: 0 | Status: SHIPPED | OUTPATIENT
Start: 2024-08-27 | End: 2024-08-29 | Stop reason: SDUPTHER

## 2024-08-27 NOTE — TELEPHONE ENCOUNTER
Refill Request     CONFIRM preferrred pharmacy with the patient.    If Mail Order Rx - Pend for 90 day refill.      Last Seen: Last Seen Department: 2/29/2024  Last Seen by PCP: 2/29/2024    Last Written: 5-    If no future appointment scheduled, route STAFF MESSAGE with patient name to the  Pool for scheduling.      Next Appointment:   Future Appointments   Date Time Provider Department Center   8/29/2024  9:00 AM Cristobal, Yas, APRN - CNP [facility]   12/9/2024  9:45 AM Raffi Mathis MD [facility]   1/27/2025  9:40 AM Glover, Eleanor, APRN - CNP [facility]       Message sent to  to schedule appt with patient?  NO      Requested Prescriptions     Pending Prescriptions Disp Refills    cetirizine (ZYRTEC) 10 MG tablet [Pharmacy Med Name: CETIRIZINE HCL 10 MG TABLET] 30 tablet 0     Sig: TAKE 1 TABLET BY MOUTH DAILY

## 2024-08-29 ENCOUNTER — OFFICE VISIT (OUTPATIENT)
Dept: FAMILY MEDICINE CLINIC | Age: 69
End: 2024-08-29
Payer: MEDICARE

## 2024-08-29 VITALS
SYSTOLIC BLOOD PRESSURE: 128 MMHG | HEART RATE: 57 BPM | DIASTOLIC BLOOD PRESSURE: 62 MMHG | WEIGHT: 150.2 LBS | BODY MASS INDEX: 25.78 KG/M2 | OXYGEN SATURATION: 96 %

## 2024-08-29 DIAGNOSIS — J41.0 SIMPLE CHRONIC BRONCHITIS (HCC): ICD-10-CM

## 2024-08-29 DIAGNOSIS — Z00.00 MEDICARE ANNUAL WELLNESS VISIT, SUBSEQUENT: Primary | ICD-10-CM

## 2024-08-29 DIAGNOSIS — Z91.81 AT HIGH RISK FOR FALLS: ICD-10-CM

## 2024-08-29 DIAGNOSIS — E11.9 TYPE 2 DIABETES MELLITUS WITHOUT COMPLICATION, WITHOUT LONG-TERM CURRENT USE OF INSULIN (HCC): ICD-10-CM

## 2024-08-29 DIAGNOSIS — D64.9 ANEMIA, UNSPECIFIED TYPE: ICD-10-CM

## 2024-08-29 LAB
ALBUMIN SERPL-MCNC: 4.1 G/DL (ref 3.4–5)
ALBUMIN/GLOB SERPL: 1.9 {RATIO} (ref 1.1–2.2)
ALP SERPL-CCNC: 70 U/L (ref 40–129)
ALT SERPL-CCNC: 26 U/L (ref 10–40)
ANION GAP SERPL CALCULATED.3IONS-SCNC: 10 MMOL/L (ref 3–16)
AST SERPL-CCNC: 25 U/L (ref 15–37)
BILIRUB SERPL-MCNC: <0.2 MG/DL (ref 0–1)
BUN SERPL-MCNC: 25 MG/DL (ref 7–20)
CALCIUM SERPL-MCNC: 9.3 MG/DL (ref 8.3–10.6)
CHLORIDE SERPL-SCNC: 105 MMOL/L (ref 99–110)
CO2 SERPL-SCNC: 26 MMOL/L (ref 21–32)
CREAT SERPL-MCNC: 0.9 MG/DL (ref 0.8–1.3)
CREAT UR-MCNC: 140 MG/DL (ref 39–259)
DEPRECATED RDW RBC AUTO: 18.8 % (ref 12.4–15.4)
EST. AVERAGE GLUCOSE BLD GHB EST-MCNC: 137 MG/DL
FOLATE SERPL-MCNC: 11.4 NG/ML (ref 4.78–24.2)
GFR SERPLBLD CREATININE-BSD FMLA CKD-EPI: >90 ML/MIN/{1.73_M2}
GLUCOSE SERPL-MCNC: 93 MG/DL (ref 70–99)
HBA1C MFR BLD: 6.4 %
HCT VFR BLD AUTO: 37.7 % (ref 40.5–52.5)
HGB BLD-MCNC: 12.2 G/DL (ref 13.5–17.5)
IRON SATN MFR SERPL: 7 % (ref 20–50)
IRON SERPL-MCNC: 30 UG/DL (ref 59–158)
MCH RBC QN AUTO: 24.9 PG (ref 26–34)
MCHC RBC AUTO-ENTMCNC: 32.5 G/DL (ref 31–36)
MCV RBC AUTO: 76.7 FL (ref 80–100)
MICROALBUMIN UR DL<=1MG/L-MCNC: <1.2 MG/DL
MICROALBUMIN/CREAT UR: NORMAL MG/G (ref 0–30)
PLATELET # BLD AUTO: 256 K/UL (ref 135–450)
PMV BLD AUTO: 8.5 FL (ref 5–10.5)
POTASSIUM SERPL-SCNC: 4.3 MMOL/L (ref 3.5–5.1)
PROT SERPL-MCNC: 6.3 G/DL (ref 6.4–8.2)
RBC # BLD AUTO: 4.91 M/UL (ref 4.2–5.9)
SODIUM SERPL-SCNC: 141 MMOL/L (ref 136–145)
TIBC SERPL-MCNC: 447 UG/DL (ref 260–445)
VIT B12 SERPL-MCNC: 311 PG/ML (ref 211–911)
WBC # BLD AUTO: 4.2 K/UL (ref 4–11)

## 2024-08-29 PROCEDURE — 3017F COLORECTAL CA SCREEN DOC REV: CPT | Performed by: NURSE PRACTITIONER

## 2024-08-29 PROCEDURE — 36415 COLL VENOUS BLD VENIPUNCTURE: CPT | Performed by: NURSE PRACTITIONER

## 2024-08-29 PROCEDURE — 3044F HG A1C LEVEL LT 7.0%: CPT | Performed by: NURSE PRACTITIONER

## 2024-08-29 PROCEDURE — 3078F DIAST BP <80 MM HG: CPT | Performed by: NURSE PRACTITIONER

## 2024-08-29 PROCEDURE — G0439 PPPS, SUBSEQ VISIT: HCPCS | Performed by: NURSE PRACTITIONER

## 2024-08-29 PROCEDURE — 1123F ACP DISCUSS/DSCN MKR DOCD: CPT | Performed by: NURSE PRACTITIONER

## 2024-08-29 PROCEDURE — 3074F SYST BP LT 130 MM HG: CPT | Performed by: NURSE PRACTITIONER

## 2024-08-29 RX ORDER — CETIRIZINE HYDROCHLORIDE 10 MG/1
10 TABLET ORAL DAILY
Qty: 90 TABLET | Refills: 3 | Status: SHIPPED | OUTPATIENT
Start: 2024-08-29 | End: 2025-08-24

## 2024-08-29 RX ORDER — ASPIRIN 81 MG/1
81 TABLET ORAL DAILY
COMMUNITY

## 2024-08-29 ASSESSMENT — PATIENT HEALTH QUESTIONNAIRE - PHQ9
1. LITTLE INTEREST OR PLEASURE IN DOING THINGS: NOT AT ALL
SUM OF ALL RESPONSES TO PHQ QUESTIONS 1-9: 0
SUM OF ALL RESPONSES TO PHQ QUESTIONS 1-9: 0
SUM OF ALL RESPONSES TO PHQ9 QUESTIONS 1 & 2: 0
SUM OF ALL RESPONSES TO PHQ QUESTIONS 1-9: 0
SUM OF ALL RESPONSES TO PHQ QUESTIONS 1-9: 0
2. FEELING DOWN, DEPRESSED OR HOPELESS: NOT AT ALL

## 2024-08-29 ASSESSMENT — LIFESTYLE VARIABLES
HOW MANY STANDARD DRINKS CONTAINING ALCOHOL DO YOU HAVE ON A TYPICAL DAY: 1 OR 2
HOW OFTEN DO YOU HAVE A DRINK CONTAINING ALCOHOL: MONTHLY OR LESS

## 2024-08-29 NOTE — PATIENT INSTRUCTIONS
Vaccines to Consider:  TDaP  Shingles  COVID RSV  Influenza     Preventing Falls: Care Instructions  Injuries and health problems such as trouble walking or poor eyesight can increase your risk of falling. So can some medicines. But there are things you can do to help prevent falls. You can exercise to get stronger. You can also arrange your home to make it safer.    Talk to your doctor about the medicines you take. Ask if any of them increase the risk of falls and whether they can be changed or stopped.   Try to exercise regularly. It can help improve your strength and balance. This can help lower your risk of falling.         Practice fall safety and prevention.   Wear low-heeled shoes that fit well and give your feet good support. Talk to your doctor if you have foot problems that make this hard.  Carry a cellphone or wear a medical alert device that you can use to call for help.  Use stepladders instead of chairs to reach high objects. Don't climb if you're at risk for falls. Ask for help, if needed.  Wear the correct eyeglasses, if you need them.        Make your home safer.   Remove rugs, cords, clutter, and furniture from walkways.  Keep your house well lit. Use night-lights in hallways and bathrooms.  Install and use sturdy handrails on stairways.  Wear nonskid footwear, even inside. Don't walk barefoot or in socks without shoes.        Be safe outside.   Use handrails, curb cuts, and ramps whenever possible.  Keep your hands free by using a shoulder bag or backpack.  Try to walk in well-lit areas. Watch out for uneven ground, changes in pavement, and debris.  Be careful in the winter. Walk on the grass or gravel when sidewalks are slippery. Use de-icer on steps and walkways. Add non-slip devices to shoes.    Put grab bars and nonskid mats in your shower or tub and near the toilet. Try to use a shower chair or bath bench when bathing.   Get into a tub or shower by putting in your weaker leg first. Get out

## 2024-08-29 NOTE — PROGRESS NOTES
8/29/2024    Chief Complaint   Patient presents with    Coronary Artery Disease    Medicare AWV    Hypertension    Hyperlipidemia         Greyson Odom is a 69 y.o. male, presents today for:      ASSESSMENT/PLAN:  1. Medicare annual wellness visit, subsequent  AWV completed today, see documentation below    2. Type 2 diabetes mellitus without complication, without long-term current use of insulin (HCC)  Diet controlled, last A1C 6.1% 2/2023  No medications indicated at this time.   DM Foot Exam completed 2/2023  Continue ASA, statin.  ACE deferred to Cardio  Goal A1C <7, Goal BP <130/90  - Hemoglobin A1C  -  DIABETES FOOT EXAM  - Microalbumin / Creatinine Urine Ratio    3. Simple chronic bronchitis (HCC)  Cough has now resolved.    Continue Symbicort BID  CT Chest Contrast negative 3/2024  Continue CPAP  No prior PFT- declining for now  Encouraged continued tobacco cessation.   - cetirizine (ZYRTEC) 10 MG tablet; Take 1 tablet by mouth daily  Dispense: 90 tablet; Refill: 3    4. Anemia, unspecified type  Noted recurrent anemia over the past 6 months.  Updated labs ordered today.  Will likely need GI referral pending labs.   - cetirizine (ZYRTEC) 10 MG tablet; Take 1 tablet by mouth daily  Dispense: 90 tablet; Refill: 3  - CBC  - Comprehensive Metabolic Panel  - Iron and TIBC  - Vitamin B12 & Folate    5. At high risk for falls  Encouraged home safety    Return in about 6 months (around 2/28/2025).    Presenting today for chronic disease follow up.     COPD: Cough has now resolving with initiation of Symbicort.  Rare use of Albuterol inhaler.  No chest pain, palpitations, N/V, abdominal pain, fever, appetitie loss.  No nasal congestion ear pain, sore throat. No ripping/ tearing sensation in back.  No jaw pain/ chest pain.  No night sweats, weight loss, appetite change, hemoptysis. 39 pack year history tobacco hx.  CT Chest negative 2/2024.  Not currently following with Pulmonary.    CAD/ Bradycardia with

## 2024-08-30 DIAGNOSIS — D64.9 ANEMIA, UNSPECIFIED TYPE: Primary | ICD-10-CM

## 2024-08-30 NOTE — RESULT ENCOUNTER NOTE
Anemia is much worse from prior.  Recommend GI evaluation due ot new findings.  I realize you had your colonoscopy 2 years ago but part of the anemia w/u is obtaining GI evaluation.      Please start an iron supplement- Ferrous Sulfate.  Take 325 mg twice a day please.  Please take with Vitamin C  Debora can we send him a list of iron rich foods please?

## 2024-09-18 ENCOUNTER — ANESTHESIA EVENT (OUTPATIENT)
Dept: ENDOSCOPY | Age: 69
End: 2024-09-18
Payer: MEDICARE

## 2024-09-19 ENCOUNTER — HOSPITAL ENCOUNTER (OUTPATIENT)
Age: 69
Setting detail: OUTPATIENT SURGERY
Discharge: HOME | End: 2024-09-19
Attending: INTERNAL MEDICINE | Admitting: INTERNAL MEDICINE
Payer: MEDICARE

## 2024-09-19 ENCOUNTER — ANESTHESIA (OUTPATIENT)
Dept: ENDOSCOPY | Age: 69
End: 2024-09-19
Payer: MEDICARE

## 2024-09-19 VITALS
HEART RATE: 60 BPM | SYSTOLIC BLOOD PRESSURE: 107 MMHG | HEIGHT: 65 IN | DIASTOLIC BLOOD PRESSURE: 60 MMHG | RESPIRATION RATE: 14 BRPM | WEIGHT: 150 LBS | TEMPERATURE: 97.8 F | OXYGEN SATURATION: 91 % | BODY MASS INDEX: 24.99 KG/M2

## 2024-09-19 DIAGNOSIS — D50.0 IRON DEFICIENCY ANEMIA DUE TO CHRONIC BLOOD LOSS: ICD-10-CM

## 2024-09-19 PROCEDURE — 88305 TISSUE EXAM BY PATHOLOGIST: CPT

## 2024-09-19 PROCEDURE — 3700000000 HC ANESTHESIA ATTENDED CARE: Performed by: INTERNAL MEDICINE

## 2024-09-19 PROCEDURE — 7100000010 HC PHASE II RECOVERY - FIRST 15 MIN: Performed by: INTERNAL MEDICINE

## 2024-09-19 PROCEDURE — 3609012400 HC EGD TRANSORAL BIOPSY SINGLE/MULTIPLE: Performed by: INTERNAL MEDICINE

## 2024-09-19 PROCEDURE — 7100000011 HC PHASE II RECOVERY - ADDTL 15 MIN: Performed by: INTERNAL MEDICINE

## 2024-09-19 PROCEDURE — 2500000003 HC RX 250 WO HCPCS: Performed by: NURSE ANESTHETIST, CERTIFIED REGISTERED

## 2024-09-19 PROCEDURE — 2580000003 HC RX 258: Performed by: NURSE ANESTHETIST, CERTIFIED REGISTERED

## 2024-09-19 PROCEDURE — 2580000003 HC RX 258: Performed by: STUDENT IN AN ORGANIZED HEALTH CARE EDUCATION/TRAINING PROGRAM

## 2024-09-19 PROCEDURE — 3609010600 HC COLONOSCOPY POLYPECTOMY SNARE/COLD BIOPSY: Performed by: INTERNAL MEDICINE

## 2024-09-19 PROCEDURE — 2709999900 HC NON-CHARGEABLE SUPPLY: Performed by: INTERNAL MEDICINE

## 2024-09-19 PROCEDURE — 3700000001 HC ADD 15 MINUTES (ANESTHESIA): Performed by: INTERNAL MEDICINE

## 2024-09-19 PROCEDURE — 6360000002 HC RX W HCPCS: Performed by: NURSE ANESTHETIST, CERTIFIED REGISTERED

## 2024-09-19 RX ORDER — LIDOCAINE HYDROCHLORIDE 10 MG/ML
0.3 INJECTION, SOLUTION EPIDURAL; INFILTRATION; INTRACAUDAL; PERINEURAL
Status: DISCONTINUED | OUTPATIENT
Start: 2024-09-19 | End: 2024-09-19 | Stop reason: HOSPADM

## 2024-09-19 RX ORDER — SODIUM CHLORIDE 0.9 % (FLUSH) 0.9 %
5-40 SYRINGE (ML) INJECTION EVERY 12 HOURS SCHEDULED
Status: DISCONTINUED | OUTPATIENT
Start: 2024-09-19 | End: 2024-09-19 | Stop reason: HOSPADM

## 2024-09-19 RX ORDER — PANTOPRAZOLE SODIUM 40 MG/1
40 TABLET, DELAYED RELEASE ORAL
Qty: 90 TABLET | Refills: 1 | Status: SHIPPED | OUTPATIENT
Start: 2024-09-19

## 2024-09-19 RX ORDER — SODIUM CHLORIDE 9 MG/ML
INJECTION, SOLUTION INTRAVENOUS PRN
Status: DISCONTINUED | OUTPATIENT
Start: 2024-09-19 | End: 2024-09-19 | Stop reason: HOSPADM

## 2024-09-19 RX ORDER — LIDOCAINE HYDROCHLORIDE 20 MG/ML
INJECTION, SOLUTION INFILTRATION; PERINEURAL
Status: DISCONTINUED | OUTPATIENT
Start: 2024-09-19 | End: 2024-09-19 | Stop reason: SDUPTHER

## 2024-09-19 RX ORDER — SODIUM CHLORIDE, SODIUM LACTATE, POTASSIUM CHLORIDE, CALCIUM CHLORIDE 600; 310; 30; 20 MG/100ML; MG/100ML; MG/100ML; MG/100ML
INJECTION, SOLUTION INTRAVENOUS CONTINUOUS
Status: DISCONTINUED | OUTPATIENT
Start: 2024-09-19 | End: 2024-09-19 | Stop reason: HOSPADM

## 2024-09-19 RX ORDER — SODIUM CHLORIDE, SODIUM LACTATE, POTASSIUM CHLORIDE, CALCIUM CHLORIDE 600; 310; 30; 20 MG/100ML; MG/100ML; MG/100ML; MG/100ML
INJECTION, SOLUTION INTRAVENOUS
Status: DISCONTINUED | OUTPATIENT
Start: 2024-09-19 | End: 2024-09-19 | Stop reason: SDUPTHER

## 2024-09-19 RX ORDER — SODIUM CHLORIDE 0.9 % (FLUSH) 0.9 %
5-40 SYRINGE (ML) INJECTION PRN
Status: DISCONTINUED | OUTPATIENT
Start: 2024-09-19 | End: 2024-09-19 | Stop reason: HOSPADM

## 2024-09-19 RX ORDER — PROPOFOL 10 MG/ML
INJECTION, EMULSION INTRAVENOUS
Status: DISCONTINUED | OUTPATIENT
Start: 2024-09-19 | End: 2024-09-19 | Stop reason: SDUPTHER

## 2024-09-19 RX ADMIN — LIDOCAINE HYDROCHLORIDE 80 MG: 20 INJECTION, SOLUTION INFILTRATION; PERINEURAL at 10:59

## 2024-09-19 RX ADMIN — SODIUM CHLORIDE, POTASSIUM CHLORIDE, SODIUM LACTATE AND CALCIUM CHLORIDE: 600; 310; 30; 20 INJECTION, SOLUTION INTRAVENOUS at 10:46

## 2024-09-19 RX ADMIN — PROPOFOL 500 MG: 10 INJECTION, EMULSION INTRAVENOUS at 10:59

## 2024-09-19 RX ADMIN — SODIUM CHLORIDE, SODIUM LACTATE, POTASSIUM CHLORIDE, AND CALCIUM CHLORIDE: .6; .31; .03; .02 INJECTION, SOLUTION INTRAVENOUS at 10:50

## 2024-09-19 ASSESSMENT — PAIN SCALES - GENERAL
PAINLEVEL_OUTOF10: 0
PAINLEVEL_OUTOF10: 0

## 2024-09-19 ASSESSMENT — LIFESTYLE VARIABLES: SMOKING_STATUS: 1

## 2024-09-19 ASSESSMENT — PAIN - FUNCTIONAL ASSESSMENT: PAIN_FUNCTIONAL_ASSESSMENT: 0-10

## 2024-12-06 NOTE — PROGRESS NOTES
Date    LABA1C 6.4 08/29/2024       IMAGING:   I have reviewed the following tests and documented in this encounter as follows and reviewed with the patient.      Colonoscopy 4/19/24  FINAL DIAGNOSIS:   Ascending colon polyp, excision:   - Fragments of benign colonic mucosa.   - Negative for dysplasia or malignancy.     BUCCA/BUCCA     48 hour monitor 4/10/24  The rhythm was sinus with sinus arrhythmia. Average NV interval 0.16, average QRS duration 0.09. Occasional PAC's, PVC's, three short 3 beat runs PAT. Diary returned; no symptoms reported.    EKG 2/29/2024  Marked sinus Bradycardia   BORDERLINE RHYTHM 46 BPM    24 HR monitor 11/2019  11.7% PVC    ECHO 8/1/2019  EF=55%; wall motion wnl    LHC 4/23/2019 with SHAE Marrufo  Mild NOCAD with cardiac microvascular disease with poor blood flow in the RCA and LAD     Assessment:  Greyson was seen today for follow-up, hypertension, premature ventricular contraction and atherosclerotic heart disease of native coronary artery wit.    Diagnoses and all orders for this visit:    Coronary artery disease of native artery of native heart with stable angina pectoris (HCC)    Essential hypertension    Sinus bradycardia    Tobacco abuse    Medication management  -     CBC with Auto Differential; Future  -     Comprehensive Metabolic Panel; Future  -     TSH with Reflex to FT4; Future  -     Lipid Panel; Future    PVC (premature ventricular contraction)    Hypercholesterolemia    ARGELIA on CPAP        Sinus bradycardia ruled out hypothyroid Bradycardia resolved.   PCP will get 48 hr Holter to quantitate arrhythmia 4/10/24 which showed sinus rhythm with sinus arrhythmia. Average NV interval 0.16, average QRS duration 0.09. Occasional PAC's, PVC's, three short 3 beat runs PAT.  Dyspnea on exertion  like when running.  Coronary microvascular dysfunction on cath 2019 continue isosorbide ASA and statin no angina  Hypercholesterolemia last lipid 2/14/24 LDL 95 he is on pravastatin 40 mg and

## 2024-12-09 ENCOUNTER — OFFICE VISIT (OUTPATIENT)
Dept: CARDIOLOGY CLINIC | Age: 69
End: 2024-12-09
Payer: MEDICARE

## 2024-12-09 VITALS
HEIGHT: 65 IN | HEART RATE: 70 BPM | OXYGEN SATURATION: 96 % | DIASTOLIC BLOOD PRESSURE: 70 MMHG | BODY MASS INDEX: 24.96 KG/M2 | SYSTOLIC BLOOD PRESSURE: 124 MMHG | WEIGHT: 149.8 LBS

## 2024-12-09 DIAGNOSIS — E78.00 HYPERCHOLESTEROLEMIA: ICD-10-CM

## 2024-12-09 DIAGNOSIS — Z79.899 MEDICATION MANAGEMENT: ICD-10-CM

## 2024-12-09 DIAGNOSIS — I49.3 PVC (PREMATURE VENTRICULAR CONTRACTION): ICD-10-CM

## 2024-12-09 DIAGNOSIS — I10 ESSENTIAL HYPERTENSION: ICD-10-CM

## 2024-12-09 DIAGNOSIS — G47.33 OSA ON CPAP: ICD-10-CM

## 2024-12-09 DIAGNOSIS — R00.1 SINUS BRADYCARDIA: ICD-10-CM

## 2024-12-09 DIAGNOSIS — I25.118 CORONARY ARTERY DISEASE OF NATIVE ARTERY OF NATIVE HEART WITH STABLE ANGINA PECTORIS (HCC): Primary | ICD-10-CM

## 2024-12-09 DIAGNOSIS — Z72.0 TOBACCO ABUSE: ICD-10-CM

## 2024-12-09 PROCEDURE — 3074F SYST BP LT 130 MM HG: CPT | Performed by: INTERNAL MEDICINE

## 2024-12-09 PROCEDURE — G8427 DOCREV CUR MEDS BY ELIG CLIN: HCPCS | Performed by: INTERNAL MEDICINE

## 2024-12-09 PROCEDURE — 1160F RVW MEDS BY RX/DR IN RCRD: CPT | Performed by: INTERNAL MEDICINE

## 2024-12-09 PROCEDURE — G8420 CALC BMI NORM PARAMETERS: HCPCS | Performed by: INTERNAL MEDICINE

## 2024-12-09 PROCEDURE — 3017F COLORECTAL CA SCREEN DOC REV: CPT | Performed by: INTERNAL MEDICINE

## 2024-12-09 PROCEDURE — 1123F ACP DISCUSS/DSCN MKR DOCD: CPT | Performed by: INTERNAL MEDICINE

## 2024-12-09 PROCEDURE — 3078F DIAST BP <80 MM HG: CPT | Performed by: INTERNAL MEDICINE

## 2024-12-09 PROCEDURE — 4004F PT TOBACCO SCREEN RCVD TLK: CPT | Performed by: INTERNAL MEDICINE

## 2024-12-09 PROCEDURE — G8484 FLU IMMUNIZE NO ADMIN: HCPCS | Performed by: INTERNAL MEDICINE

## 2024-12-09 PROCEDURE — 99214 OFFICE O/P EST MOD 30 MIN: CPT | Performed by: INTERNAL MEDICINE

## 2024-12-09 PROCEDURE — 1159F MED LIST DOCD IN RCRD: CPT | Performed by: INTERNAL MEDICINE

## 2024-12-09 NOTE — PATIENT INSTRUCTIONS
Plan:  Labs reviewed in epic and discussed with patient.  Current medications reviewed.  Refills given as warranted.  You can call 1800 Quit Now for free resources to help you quit smoking.    No cardiac testing at this time.  I will personally review your tests and then I will have my staff call you with the results.  Recommend blood work in June  -CBC, CMP, TSH, Lipid  -you will need to be fasting for 8 hours before the testing  -it is ok to take your morning medications with sips of water or black coffee    Follow up with me in July.

## 2025-01-07 DIAGNOSIS — J40 BRONCHITIS: ICD-10-CM

## 2025-01-07 RX ORDER — ALBUTEROL SULFATE 90 UG/1
2 INHALANT RESPIRATORY (INHALATION) EVERY 6 HOURS PRN
Qty: 18 G | Refills: 3 | Status: SHIPPED | OUTPATIENT
Start: 2025-01-07

## 2025-01-27 ENCOUNTER — OFFICE VISIT (OUTPATIENT)
Dept: PULMONOLOGY | Age: 70
End: 2025-01-27
Payer: MEDICARE

## 2025-01-27 VITALS
BODY MASS INDEX: 24.86 KG/M2 | HEIGHT: 65 IN | DIASTOLIC BLOOD PRESSURE: 88 MMHG | WEIGHT: 149.2 LBS | HEART RATE: 70 BPM | SYSTOLIC BLOOD PRESSURE: 144 MMHG | RESPIRATION RATE: 16 BRPM | OXYGEN SATURATION: 95 %

## 2025-01-27 DIAGNOSIS — G47.33 OSA (OBSTRUCTIVE SLEEP APNEA): Primary | ICD-10-CM

## 2025-01-27 DIAGNOSIS — Z71.89 CPAP USE COUNSELING: ICD-10-CM

## 2025-01-27 DIAGNOSIS — I10 ESSENTIAL HYPERTENSION: ICD-10-CM

## 2025-01-27 PROCEDURE — 1123F ACP DISCUSS/DSCN MKR DOCD: CPT | Performed by: NURSE PRACTITIONER

## 2025-01-27 PROCEDURE — G8420 CALC BMI NORM PARAMETERS: HCPCS | Performed by: NURSE PRACTITIONER

## 2025-01-27 PROCEDURE — 3077F SYST BP >= 140 MM HG: CPT | Performed by: NURSE PRACTITIONER

## 2025-01-27 PROCEDURE — 3079F DIAST BP 80-89 MM HG: CPT | Performed by: NURSE PRACTITIONER

## 2025-01-27 PROCEDURE — 1159F MED LIST DOCD IN RCRD: CPT | Performed by: NURSE PRACTITIONER

## 2025-01-27 PROCEDURE — 1160F RVW MEDS BY RX/DR IN RCRD: CPT | Performed by: NURSE PRACTITIONER

## 2025-01-27 PROCEDURE — G8427 DOCREV CUR MEDS BY ELIG CLIN: HCPCS | Performed by: NURSE PRACTITIONER

## 2025-01-27 PROCEDURE — 3017F COLORECTAL CA SCREEN DOC REV: CPT | Performed by: NURSE PRACTITIONER

## 2025-01-27 PROCEDURE — 4004F PT TOBACCO SCREEN RCVD TLK: CPT | Performed by: NURSE PRACTITIONER

## 2025-01-27 PROCEDURE — 99213 OFFICE O/P EST LOW 20 MIN: CPT | Performed by: NURSE PRACTITIONER

## 2025-01-27 ASSESSMENT — SLEEP AND FATIGUE QUESTIONNAIRES
ESS TOTAL SCORE: 3
HOW LIKELY ARE YOU TO NOD OFF OR FALL ASLEEP WHILE SITTING AND TALKING TO SOMEONE: WOULD NEVER DOZE
HOW LIKELY ARE YOU TO NOD OFF OR FALL ASLEEP WHILE WATCHING TV: WOULD NEVER DOZE
HOW LIKELY ARE YOU TO NOD OFF OR FALL ASLEEP WHILE SITTING QUIETLY AFTER LUNCH WITHOUT ALCOHOL: SLIGHT CHANCE OF DOZING
HOW LIKELY ARE YOU TO NOD OFF OR FALL ASLEEP WHILE SITTING AND READING: SLIGHT CHANCE OF DOZING
HOW LIKELY ARE YOU TO NOD OFF OR FALL ASLEEP WHILE SITTING INACTIVE IN A PUBLIC PLACE: WOULD NEVER DOZE
HOW LIKELY ARE YOU TO NOD OFF OR FALL ASLEEP WHEN YOU ARE A PASSENGER IN A CAR FOR AN HOUR WITHOUT A BREAK: WOULD NEVER DOZE
HOW LIKELY ARE YOU TO NOD OFF OR FALL ASLEEP IN A CAR, WHILE STOPPED FOR A FEW MINUTES IN TRAFFIC: WOULD NEVER DOZE
HOW LIKELY ARE YOU TO NOD OFF OR FALL ASLEEP WHILE LYING DOWN TO REST IN THE AFTERNOON WHEN CIRCUMSTANCES PERMIT: SLIGHT CHANCE OF DOZING

## 2025-01-27 NOTE — PROGRESS NOTES
Patient ID: Greyson Odom is a 69 y.o. male who is being seen today for   Chief Complaint   Patient presents with    Sleep Apnea     1 year follow up         HPI:   Greyson Odom is a 69 y.o. male in office for ARGELIA follow up.  States he is doing well with CPAP Patient is using CPAP   5-7 hrs/night. Using humidifier. No snoring on CPAP. The pressure is well tolerated. The mask is comfortable- nasal pillows with chin strap. No mask leak. No significant daytime sleepiness. No nodding off when driving. No dry nose or throat. No fatigue. Bedtime is 830-1030 pm and rise time is 4-530 am. Sleep onset is usually few minutes. Wakes up 0-1 times at night total. 0-1 nocturia.  Occasional naps during the day. No headache in am. No weight gain. 1 pot coffee a day. No alcohol. ESS is 3          1/27/2025     9:54 AM 1/22/2024    10:00 AM 1/27/2023     9:50 AM 1/28/2022    10:55 AM 1/26/2021     9:33 AM 2/11/2020    10:53 AM 11/5/2019     9:12 AM   Sleep Medicine   Sitting and reading 1 2 2 1 0 1 0   Watching TV 0 0 0 1 0 1 2   Sitting, inactive in a public place (e.g. a theatre or a meeting) 0 2 1 2 0 0 0   As a passenger in a car for an hour without a break 0 0 0 0 0 0 0   Lying down to rest in the afternoon when circumstances permit 1 2 2 2 1 1 3   Sitting and talking to someone 0 0 0 0 0 0 0   Sitting quietly after a lunch without alcohol 1 2 1 1 0 0 0   In a car, while stopped for a few minutes in traffic 0 0 0 0 0 0 0   Moran Sleepiness Score 3 8 6 7 1 3 5   Neck (Inches) 15.5 14.5    14.5 14       Past Medical History:  Past Medical History:   Diagnosis Date    Abnormal echocardiogram 04/2019    Hyperlipidemia     Hypertension     Myocardiopathy (HCC) 03/02/2021    Prostate cancer (HCC) 2012    total prostatectomy,DrDilip    Sleep apnea     Sleep apnea     uses cpap    Smoker        Past Surgical History:        Procedure Laterality Date    CARDIAC CATHETERIZATION  04/23/2019    Non Obs CAD, medical management

## 2025-02-24 SDOH — ECONOMIC STABILITY: INCOME INSECURITY: IN THE LAST 12 MONTHS, WAS THERE A TIME WHEN YOU WERE NOT ABLE TO PAY THE MORTGAGE OR RENT ON TIME?: NO

## 2025-02-24 SDOH — ECONOMIC STABILITY: FOOD INSECURITY: WITHIN THE PAST 12 MONTHS, YOU WORRIED THAT YOUR FOOD WOULD RUN OUT BEFORE YOU GOT MONEY TO BUY MORE.: NEVER TRUE

## 2025-02-24 SDOH — ECONOMIC STABILITY: FOOD INSECURITY: WITHIN THE PAST 12 MONTHS, THE FOOD YOU BOUGHT JUST DIDN'T LAST AND YOU DIDN'T HAVE MONEY TO GET MORE.: NEVER TRUE

## 2025-02-24 SDOH — ECONOMIC STABILITY: TRANSPORTATION INSECURITY
IN THE PAST 12 MONTHS, HAS THE LACK OF TRANSPORTATION KEPT YOU FROM MEDICAL APPOINTMENTS OR FROM GETTING MEDICATIONS?: NO

## 2025-02-24 ASSESSMENT — PATIENT HEALTH QUESTIONNAIRE - PHQ9
SUM OF ALL RESPONSES TO PHQ QUESTIONS 1-9: 0
SUM OF ALL RESPONSES TO PHQ9 QUESTIONS 1 & 2: 0
SUM OF ALL RESPONSES TO PHQ QUESTIONS 1-9: 0
2. FEELING DOWN, DEPRESSED OR HOPELESS: NOT AT ALL
1. LITTLE INTEREST OR PLEASURE IN DOING THINGS: NOT AT ALL
SUM OF ALL RESPONSES TO PHQ9 QUESTIONS 1 & 2: 0
2. FEELING DOWN, DEPRESSED OR HOPELESS: NOT AT ALL
1. LITTLE INTEREST OR PLEASURE IN DOING THINGS: NOT AT ALL

## 2025-02-27 ENCOUNTER — OFFICE VISIT (OUTPATIENT)
Dept: FAMILY MEDICINE CLINIC | Age: 70
End: 2025-02-27

## 2025-02-27 VITALS
OXYGEN SATURATION: 94 % | WEIGHT: 147 LBS | DIASTOLIC BLOOD PRESSURE: 68 MMHG | HEART RATE: 60 BPM | BODY MASS INDEX: 24.46 KG/M2 | TEMPERATURE: 97.6 F | SYSTOLIC BLOOD PRESSURE: 124 MMHG

## 2025-02-27 DIAGNOSIS — I49.3 PVC (PREMATURE VENTRICULAR CONTRACTION): ICD-10-CM

## 2025-02-27 DIAGNOSIS — I25.119 ATHEROSCLEROSIS OF NATIVE CORONARY ARTERY OF NATIVE HEART WITH ANGINA PECTORIS: ICD-10-CM

## 2025-02-27 DIAGNOSIS — G47.33 OSA (OBSTRUCTIVE SLEEP APNEA): ICD-10-CM

## 2025-02-27 DIAGNOSIS — J30.89 ALLERGIC RHINITIS DUE TO OTHER ALLERGIC TRIGGER, UNSPECIFIED SEASONALITY: ICD-10-CM

## 2025-02-27 DIAGNOSIS — R73.03 PREDIABETES: ICD-10-CM

## 2025-02-27 DIAGNOSIS — J41.0 SIMPLE CHRONIC BRONCHITIS (HCC): ICD-10-CM

## 2025-02-27 DIAGNOSIS — Z23 INFLUENZA VACCINATION ADMINISTERED AT CURRENT VISIT: ICD-10-CM

## 2025-02-27 DIAGNOSIS — Z85.46 HISTORY OF MALIGNANT NEOPLASM OF PROSTATE: ICD-10-CM

## 2025-02-27 DIAGNOSIS — I50.22 CHRONIC SYSTOLIC (CONGESTIVE) HEART FAILURE (HCC): ICD-10-CM

## 2025-02-27 DIAGNOSIS — I10 ESSENTIAL HYPERTENSION: ICD-10-CM

## 2025-02-27 DIAGNOSIS — M18.0 ARTHRITIS OF CARPOMETACARPAL (CMC) JOINTS OF BOTH THUMBS: Primary | ICD-10-CM

## 2025-02-27 DIAGNOSIS — Z87.891 PERSONAL HISTORY OF TOBACCO USE: ICD-10-CM

## 2025-02-27 DIAGNOSIS — E78.5 HYPERLIPIDEMIA LDL GOAL <130: ICD-10-CM

## 2025-02-27 LAB
ALBUMIN SERPL-MCNC: 4.6 G/DL (ref 3.4–5)
ALBUMIN/GLOB SERPL: 2 {RATIO} (ref 1.1–2.2)
ALP SERPL-CCNC: 79 U/L (ref 40–129)
ALT SERPL-CCNC: 32 U/L (ref 10–40)
ANION GAP SERPL CALCULATED.3IONS-SCNC: 10 MMOL/L (ref 3–16)
AST SERPL-CCNC: 24 U/L (ref 15–37)
BASOPHILS # BLD: 0 K/UL (ref 0–0.2)
BASOPHILS NFR BLD: 0.6 %
BILIRUB SERPL-MCNC: <0.2 MG/DL (ref 0–1)
BUN SERPL-MCNC: 19 MG/DL (ref 7–20)
CALCIUM SERPL-MCNC: 10 MG/DL (ref 8.3–10.6)
CHLORIDE SERPL-SCNC: 103 MMOL/L (ref 99–110)
CHOLEST SERPL-MCNC: 156 MG/DL (ref 0–199)
CO2 SERPL-SCNC: 28 MMOL/L (ref 21–32)
CREAT SERPL-MCNC: 0.9 MG/DL (ref 0.8–1.3)
CREAT UR-MCNC: 96.9 MG/DL (ref 39–259)
DEPRECATED RDW RBC AUTO: 17.5 % (ref 12.4–15.4)
EOSINOPHIL # BLD: 0.2 K/UL (ref 0–0.6)
EOSINOPHIL NFR BLD: 4.6 %
GFR SERPLBLD CREATININE-BSD FMLA CKD-EPI: >90 ML/MIN/{1.73_M2}
GLUCOSE SERPL-MCNC: 101 MG/DL (ref 70–99)
HCT VFR BLD AUTO: 47.3 % (ref 40.5–52.5)
HDLC SERPL-MCNC: 44 MG/DL (ref 40–60)
HGB BLD-MCNC: 15.7 G/DL (ref 13.5–17.5)
LDLC SERPL CALC-MCNC: 93 MG/DL
LYMPHOCYTES # BLD: 1.6 K/UL (ref 1–5.1)
LYMPHOCYTES NFR BLD: 33.8 %
MCH RBC QN AUTO: 30 PG (ref 26–34)
MCHC RBC AUTO-ENTMCNC: 33.2 G/DL (ref 31–36)
MCV RBC AUTO: 90.3 FL (ref 80–100)
MICROALBUMIN UR DL<=1MG/L-MCNC: <1.2 MG/DL
MICROALBUMIN/CREAT UR: NORMAL MG/G (ref 0–30)
MONOCYTES # BLD: 0.5 K/UL (ref 0–1.3)
MONOCYTES NFR BLD: 9.5 %
NEUTROPHILS # BLD: 2.5 K/UL (ref 1.7–7.7)
NEUTROPHILS NFR BLD: 51.5 %
PLATELET # BLD AUTO: 237 K/UL (ref 135–450)
PMV BLD AUTO: 8.4 FL (ref 5–10.5)
POTASSIUM SERPL-SCNC: 4.3 MMOL/L (ref 3.5–5.1)
PROT SERPL-MCNC: 6.9 G/DL (ref 6.4–8.2)
PSA SERPL DL<=0.01 NG/ML-MCNC: <0.02 NG/ML (ref 0–4)
RBC # BLD AUTO: 5.24 M/UL (ref 4.2–5.9)
SODIUM SERPL-SCNC: 141 MMOL/L (ref 136–145)
TRIGL SERPL-MCNC: 93 MG/DL (ref 0–150)
TSH SERPL DL<=0.005 MIU/L-ACNC: 0.66 UIU/ML (ref 0.27–4.2)
VLDLC SERPL CALC-MCNC: 19 MG/DL
WBC # BLD AUTO: 4.8 K/UL (ref 4–11)

## 2025-02-27 RX ORDER — METHYLPREDNISOLONE 4 MG/1
TABLET ORAL
Qty: 1 KIT | Refills: 0 | Status: SHIPPED | OUTPATIENT
Start: 2025-02-27 | End: 2025-03-05

## 2025-02-27 RX ORDER — FLUTICASONE PROPIONATE 50 MCG
1 SPRAY, SUSPENSION (ML) NASAL DAILY
Qty: 32 G | Refills: 1 | Status: SHIPPED | OUTPATIENT
Start: 2025-02-27

## 2025-02-27 ASSESSMENT — ENCOUNTER SYMPTOMS
SORE THROAT: 0
SHORTNESS OF BREATH: 1
SINUS PAIN: 0
BLOOD IN STOOL: 0
CHEST TIGHTNESS: 0
TROUBLE SWALLOWING: 0
DIARRHEA: 0
COUGH: 1
CONSTIPATION: 0
FACIAL SWELLING: 0
RHINORRHEA: 0
SINUS PRESSURE: 0

## 2025-02-27 NOTE — PROGRESS NOTES
[facility] ENDOSCOPY    EYE SURGERY  1989    eye trauma os with fixed pupil;vision is ok;    PROSTATE BIOPSY      PROSTATECTOMY  02/14/2012    UPPER GASTROINTESTINAL ENDOSCOPY N/A 9/19/2024    ESOPHAGOGASTRODUODENOSCOPY BIOPSY performed by Yvonne Schafer MD at [facility] ENDOSCOPY     Social History     Socioeconomic History    Marital status:      Spouse name: Not on file    Number of children: Not on file    Years of education: Not on file    Highest education level: Not on file   Occupational History    Not on file   Tobacco Use    Smoking status: Every Day     Current packs/day: 0.50     Average packs/day: 0.5 packs/day for 40.2 years (20.1 ttl pk-yrs)     Types: Cigarettes     Start date: 1985    Smokeless tobacco: Current     Types: Snuff   Vaping Use    Vaping status: Never Used   Substance and Sexual Activity    Alcohol use: Not Currently     Comment: rare    Drug use: Never    Sexual activity: Not Currently     Partners: Female   Other Topics Concern    Not on file   Social History Narrative    6/2014 lives with son;;hines     Social Determinants of Health     Financial Resource Strain: Low Risk  (2/14/2024)    Overall Financial Resource Strain (CARDIA)     Difficulty of Paying Living Expenses: Not hard at all   Food Insecurity: Not on file (2/24/2025)   Transportation Needs: Unknown (2/14/2024)    PRAPARE - Transportation     Lack of Transportation (Medical): Not on file     Lack of Transportation (Non-Medical): No   Physical Activity: Sufficiently Active (8/29/2024)    Exercise Vital Sign     Days of Exercise per Week: 7 days     Minutes of Exercise per Session: 90 min   Stress: Not on file   Social Connections: Not on file   Intimate Partner Violence: Not on file   Housing Stability: Unknown (2/24/2025)    Housing Stability Vital Sign     Unable to Pay for Housing in the Last Year: Not on file     Number of Times Moved in the Last Year: 0     Homeless in the Last Year: No     Family History

## 2025-02-27 NOTE — PATIENT INSTRUCTIONS
found through screening. But because doctors can't tell which ones will turn out to be harmless, most will be treated. This means that you may get treatment--including surgery, radiation, or chemotherapy--that you don't need.  There is a risk of damage to cells or tissue from being exposed to radiation, including the small amounts used in CTs, X-rays, and other medical tests. Over time, exposure to radiation may cause cancer and other health problems. But in most cases, the risk of getting cancer from being exposed to small amounts of radiation is low. It's not a reason to avoid these tests for most people.  What are the benefits of screening?  Your scan may be normal (negative).  For some people who are at higher risk, screening lowers the chance of dying of lung cancer. How much and how long you smoked helps to determine your risk level. Screening can find some cancers early, when treatment may be more likely to work.  What happens after screening?  The results of your CT scan will be sent to your doctor. Someone from your care team will explain the results of your scan and answer any questions you may have. If you need any follow-up, he or she will help you understand what to do next.  After a lung cancer screening, you can go back to your usual activities right away.  A lung cancer screening test can't tell if you have lung cancer. If your results are positive, your doctor can't tell whether an abnormal finding is a harmless nodule, cancer, or something else without doing more tests.  What can you do to help prevent lung cancer?  Some lung cancers can't be prevented. But if you smoke, quitting smoking is the best step you can take to prevent lung cancer. If you want to quit, your doctor can recommend medicines or other ways to help.  Follow-up care is a key part of your treatment and safety. Be sure to make and go to all appointments, and call your doctor if you are having problems. It's also a good idea to know

## 2025-02-28 PROBLEM — E11.9 TYPE 2 DIABETES MELLITUS WITHOUT COMPLICATION, WITHOUT LONG-TERM CURRENT USE OF INSULIN (HCC): Status: RESOLVED | Noted: 2023-02-10 | Resolved: 2025-02-28

## 2025-02-28 LAB
EST. AVERAGE GLUCOSE BLD GHB EST-MCNC: 128.4 MG/DL
HBA1C MFR BLD: 6.1 %

## 2025-02-28 RX ORDER — HYDROCHLOROTHIAZIDE 25 MG/1
TABLET ORAL
Qty: 90 TABLET | Refills: 3 | Status: SHIPPED | OUTPATIENT
Start: 2025-02-28

## 2025-02-28 RX ORDER — PANTOPRAZOLE SODIUM 40 MG/1
40 TABLET, DELAYED RELEASE ORAL
Qty: 90 TABLET | Refills: 3 | Status: SHIPPED | OUTPATIENT
Start: 2025-02-28

## 2025-02-28 RX ORDER — ISOSORBIDE MONONITRATE 30 MG/1
30 TABLET, EXTENDED RELEASE ORAL NIGHTLY
Qty: 90 TABLET | Refills: 3 | Status: SHIPPED | OUTPATIENT
Start: 2025-02-28

## 2025-02-28 RX ORDER — FLUTICASONE PROPIONATE AND SALMETEROL XINAFOATE 115; 21 UG/1; UG/1
2 AEROSOL, METERED RESPIRATORY (INHALATION) 2 TIMES DAILY
Qty: 1 EACH | Refills: 5 | Status: SHIPPED | OUTPATIENT
Start: 2025-02-28

## 2025-02-28 RX ORDER — PRAVASTATIN SODIUM 40 MG
TABLET ORAL
Qty: 90 TABLET | Refills: 3 | Status: SHIPPED | OUTPATIENT
Start: 2025-02-28

## 2025-02-28 NOTE — RESULT ENCOUNTER NOTE
No anemia/ infection.      Normal kidney/ liver function.      Blood sugars continue to be well controlled.      Prostate level similar to prior.      Cholesterol controlled.     Thyroid level is normal.     No protein in urine

## 2025-03-07 ENCOUNTER — HOSPITAL ENCOUNTER (OUTPATIENT)
Dept: CT IMAGING | Age: 70
Discharge: HOME | End: 2025-03-07
Payer: MEDICARE

## 2025-03-07 DIAGNOSIS — Z87.891 PERSONAL HISTORY OF TOBACCO USE: ICD-10-CM

## 2025-03-07 PROCEDURE — 71271 CT THORAX LUNG CANCER SCR C-: CPT

## 2025-03-08 SDOH — HEALTH STABILITY: PHYSICAL HEALTH: ON AVERAGE, HOW MANY DAYS PER WEEK DO YOU ENGAGE IN MODERATE TO STRENUOUS EXERCISE (LIKE A BRISK WALK)?: 7 DAYS

## 2025-03-08 SDOH — HEALTH STABILITY: PHYSICAL HEALTH: ON AVERAGE, HOW MANY MINUTES DO YOU ENGAGE IN EXERCISE AT THIS LEVEL?: 150+ MIN

## 2025-03-11 ENCOUNTER — OFFICE VISIT (OUTPATIENT)
Dept: ORTHOPEDIC SURGERY | Age: 70
End: 2025-03-11

## 2025-03-11 ENCOUNTER — RESULTS FOLLOW-UP (OUTPATIENT)
Dept: CT IMAGING | Age: 70
End: 2025-03-11

## 2025-03-11 DIAGNOSIS — M79.644 PAIN OF RIGHT THUMB: ICD-10-CM

## 2025-03-11 DIAGNOSIS — M79.645 PAIN OF LEFT THUMB: ICD-10-CM

## 2025-03-11 DIAGNOSIS — M18.0 ARTHRITIS OF CARPOMETACARPAL (CMC) JOINT OF BOTH THUMBS: Primary | ICD-10-CM

## 2025-03-11 RX ORDER — MELOXICAM 15 MG/1
7.5 TABLET ORAL DAILY
Qty: 30 TABLET | Refills: 0 | Status: SHIPPED | OUTPATIENT
Start: 2025-03-11

## 2025-03-11 NOTE — PROGRESS NOTES
Hand, Upper Extremity and Reconstructive Surgery                Risa Torres MD                                             History of Present Illness  The patient is a 69-year-old right-hand dominant male presenting with bilateral thumb pain.  Symptoms began several months ago.  His left side is more severe than the right.  Denies any injury.  He reports radiating pain extending up the arm and a notable reduction in  strength, particularly when attempting to open a water bottle. There is no history of trauma or identifiable precipitating event. The patient has not sought prior medical evaluation for this condition and is not currently on anticoagulant therapy.        Occupation: Farmer                                                                                  Current Outpatient Medications   Medication Instructions    albuterol sulfate HFA (PROVENTIL;VENTOLIN;PROAIR) 108 (90 Base) MCG/ACT inhaler 2 puffs, EVERY 6 HOURS PRN    aspirin 81 mg, Oral, DAILY    cetirizine (ZYRTEC) 10 mg, Oral, DAILY    fluticasone (FLONASE) 50 MCG/ACT nasal spray 1 spray, Each Nostril, DAILY    fluticasone-salmeterol (ADVAIR HFA) 115-21 MCG/ACT inhaler 2 puffs, Inhalation, 2 TIMES DAILY    hydroCHLOROthiazide (HYDRODIURIL) 25 MG tablet TAKE ONE TABLET BY MOUTH DAILY    isosorbide mononitrate (IMDUR) 30 mg, Oral, Nightly, TAKE ONE TABLET BY MOUTH DAILY    meloxicam (MOBIC) 7.5 mg, Oral, DAILY    Multiple Vitamin (MULTIVITAMIN ADULT PO) 1 tablet, Oral, DAILY    Multiple Vitamins-Minerals (EYE VITAMINS) CAPS 1 capsule, Oral, DAILY, [employer] Vitamin (Lutein 10mg, Meso-Zeaxanthin 10mg, Zeaxanthin 2mg)    niacin 500 mg, Oral, DAILY RESP    pantoprazole (PROTONIX) 40 mg, Oral, DAILY BEFORE BREAKFAST    pravastatin (PRAVACHOL) 40 MG tablet TAKE ONE TABLET BY MOUTH DAILY       Past Medical History:   Diagnosis Date    Abnormal echocardiogram 04/2019    Hyperlipidemia     Hypertension

## 2025-04-14 DIAGNOSIS — J41.0 SIMPLE CHRONIC BRONCHITIS (HCC): ICD-10-CM

## 2025-04-14 DIAGNOSIS — D64.9 ANEMIA, UNSPECIFIED TYPE: ICD-10-CM

## 2025-04-14 RX ORDER — CETIRIZINE HYDROCHLORIDE 10 MG/1
10 TABLET ORAL DAILY
Qty: 90 TABLET | Refills: 3 | Status: SHIPPED | OUTPATIENT
Start: 2025-04-14 | End: 2026-04-09

## 2025-06-11 ENCOUNTER — OFFICE VISIT (OUTPATIENT)
Dept: ORTHOPEDIC SURGERY | Age: 70
End: 2025-06-11
Payer: MEDICARE

## 2025-06-11 VITALS — HEIGHT: 65 IN | WEIGHT: 147 LBS | BODY MASS INDEX: 24.49 KG/M2

## 2025-06-11 DIAGNOSIS — M18.0 ARTHRITIS OF CARPOMETACARPAL (CMC) JOINT OF BOTH THUMBS: Primary | ICD-10-CM

## 2025-06-11 PROCEDURE — 4004F PT TOBACCO SCREEN RCVD TLK: CPT | Performed by: STUDENT IN AN ORGANIZED HEALTH CARE EDUCATION/TRAINING PROGRAM

## 2025-06-11 PROCEDURE — 20600 DRAIN/INJ JOINT/BURSA W/O US: CPT | Performed by: STUDENT IN AN ORGANIZED HEALTH CARE EDUCATION/TRAINING PROGRAM

## 2025-06-11 PROCEDURE — G8427 DOCREV CUR MEDS BY ELIG CLIN: HCPCS | Performed by: STUDENT IN AN ORGANIZED HEALTH CARE EDUCATION/TRAINING PROGRAM

## 2025-06-11 PROCEDURE — 3017F COLORECTAL CA SCREEN DOC REV: CPT | Performed by: STUDENT IN AN ORGANIZED HEALTH CARE EDUCATION/TRAINING PROGRAM

## 2025-06-11 PROCEDURE — 1123F ACP DISCUSS/DSCN MKR DOCD: CPT | Performed by: STUDENT IN AN ORGANIZED HEALTH CARE EDUCATION/TRAINING PROGRAM

## 2025-06-11 PROCEDURE — 1159F MED LIST DOCD IN RCRD: CPT | Performed by: STUDENT IN AN ORGANIZED HEALTH CARE EDUCATION/TRAINING PROGRAM

## 2025-06-11 PROCEDURE — G8420 CALC BMI NORM PARAMETERS: HCPCS | Performed by: STUDENT IN AN ORGANIZED HEALTH CARE EDUCATION/TRAINING PROGRAM

## 2025-06-11 PROCEDURE — 1125F AMNT PAIN NOTED PAIN PRSNT: CPT | Performed by: STUDENT IN AN ORGANIZED HEALTH CARE EDUCATION/TRAINING PROGRAM

## 2025-06-11 PROCEDURE — 99214 OFFICE O/P EST MOD 30 MIN: CPT | Performed by: STUDENT IN AN ORGANIZED HEALTH CARE EDUCATION/TRAINING PROGRAM

## 2025-06-11 RX ORDER — BETAMETHASONE SODIUM PHOSPHATE AND BETAMETHASONE ACETATE 3; 3 MG/ML; MG/ML
6 INJECTION, SUSPENSION INTRA-ARTICULAR; INTRALESIONAL; INTRAMUSCULAR; SOFT TISSUE ONCE
Status: COMPLETED | OUTPATIENT
Start: 2025-06-11 | End: 2025-06-11

## 2025-06-11 RX ORDER — LIDOCAINE HYDROCHLORIDE 10 MG/ML
3 INJECTION, SOLUTION INFILTRATION; PERINEURAL ONCE
Status: COMPLETED | OUTPATIENT
Start: 2025-06-11 | End: 2025-06-11

## 2025-06-11 RX ADMIN — BETAMETHASONE SODIUM PHOSPHATE AND BETAMETHASONE ACETATE 6 MG: 3; 3 INJECTION, SUSPENSION INTRA-ARTICULAR; INTRALESIONAL; INTRAMUSCULAR; SOFT TISSUE at 09:23

## 2025-06-11 RX ADMIN — LIDOCAINE HYDROCHLORIDE 3 ML: 10 INJECTION, SOLUTION INFILTRATION; PERINEURAL at 09:24

## 2025-06-11 NOTE — PROGRESS NOTES
views dated 3/11/2025 independent reviewed and discussed the patient.  The films revealed thumb CMC joint space narrowing with bone spurs consistent with osteoarthritis      Assessment & Plan  1. Bilateral thumb osteoarthritis.  Patient has had persistent pain despite CMC thumb bracing.  He is interested in steroid injections today.  Please see procedure note below.  Patient will follow-up with me in 2 months if still symptomatic.    Right thumb CMC Joint Injection  After discussing the risks and benefits of steroid injection, the patient elected to proceed with the injection. Under sterile conditions, I injected the right thumb CMC joint with 0.5 cc of 1% lidocaine and 0.5 cc of 6mg/ml Bethamethasone. Appropriate injection risks and instructions were discussed.     Left thumb CMC Joint Injection  After discussing the risks and benefits of steroid injection, the patient elected to proceed with the injection. Under sterile conditions, I injected the left thumb CMC joint with 0.5 cc of 1% lidocaine and 0.5 cc of 6mg/ml Bethamethasone. Appropriate injection risks and instructions were discussed.         No orders of the defined types were placed in this encounter.      The patient (or guardian, if applicable) and other individuals in attendance with the patient were advised that Artificial Intelligence will be utilized during this visit to record, process the conversation to generate a clinical note, and support improvement of the AI technology. The patient (or guardian, if applicable) and other individuals in attendance at the appointment consented to the use of AI, including the recording.

## 2025-06-30 ENCOUNTER — CLINICAL SUPPORT (OUTPATIENT)
Dept: FAMILY MEDICINE CLINIC | Age: 70
End: 2025-06-30
Payer: MEDICARE

## 2025-06-30 DIAGNOSIS — Z79.899 MEDICATION MANAGEMENT: ICD-10-CM

## 2025-06-30 LAB
ALBUMIN SERPL-MCNC: 4.5 G/DL (ref 3.4–5)
ALBUMIN/GLOB SERPL: 1.9 {RATIO} (ref 1.1–2.2)
ALP SERPL-CCNC: 81 U/L (ref 40–129)
ALT SERPL-CCNC: 45 U/L (ref 10–40)
ANION GAP SERPL CALCULATED.3IONS-SCNC: 12 MMOL/L (ref 3–16)
AST SERPL-CCNC: 24 U/L (ref 15–37)
BASOPHILS # BLD: 0 K/UL (ref 0–0.2)
BASOPHILS NFR BLD: 0 %
BILIRUB SERPL-MCNC: 0.3 MG/DL (ref 0–1)
BUN SERPL-MCNC: 21 MG/DL (ref 7–20)
CALCIUM SERPL-MCNC: 9.9 MG/DL (ref 8.3–10.6)
CHLORIDE SERPL-SCNC: 102 MMOL/L (ref 99–110)
CHOLEST SERPL-MCNC: 163 MG/DL (ref 0–199)
CO2 SERPL-SCNC: 27 MMOL/L (ref 21–32)
CREAT SERPL-MCNC: 1 MG/DL (ref 0.8–1.3)
DEPRECATED RDW RBC AUTO: 14.3 % (ref 12.4–15.4)
EOSINOPHIL # BLD: 0.2 K/UL (ref 0–0.6)
EOSINOPHIL NFR BLD: 3 %
GFR SERPLBLD CREATININE-BSD FMLA CKD-EPI: 81 ML/MIN/{1.73_M2}
GLUCOSE SERPL-MCNC: 106 MG/DL (ref 70–99)
HCT VFR BLD AUTO: 48.2 % (ref 40.5–52.5)
HDLC SERPL-MCNC: 44 MG/DL (ref 40–60)
HGB BLD-MCNC: 16.5 G/DL (ref 13.5–17.5)
LDLC SERPL CALC-MCNC: 103 MG/DL
LYMPHOCYTES # BLD: 2 K/UL (ref 1–5.1)
LYMPHOCYTES NFR BLD: 35 %
MCH RBC QN AUTO: 31.8 PG (ref 26–34)
MCHC RBC AUTO-ENTMCNC: 34.2 G/DL (ref 31–36)
MCV RBC AUTO: 92.9 FL (ref 80–100)
MONOCYTES # BLD: 0.8 K/UL (ref 0–1.3)
MONOCYTES NFR BLD: 14 %
NEUTROPHILS # BLD: 2.7 K/UL (ref 1.7–7.7)
NEUTROPHILS NFR BLD: 46 %
NEUTS BAND NFR BLD MANUAL: 2 % (ref 0–7)
PLATELET # BLD AUTO: 230 K/UL (ref 135–450)
PMV BLD AUTO: 8.8 FL (ref 5–10.5)
POTASSIUM SERPL-SCNC: 4.7 MMOL/L (ref 3.5–5.1)
PROT SERPL-MCNC: 6.9 G/DL (ref 6.4–8.2)
RBC # BLD AUTO: 5.18 M/UL (ref 4.2–5.9)
RBC MORPH BLD: NORMAL
SLIDE REVIEW: NORMAL
SODIUM SERPL-SCNC: 141 MMOL/L (ref 136–145)
TRIGL SERPL-MCNC: 82 MG/DL (ref 0–150)
TSH SERPL DL<=0.005 MIU/L-ACNC: 0.98 UIU/ML (ref 0.27–4.2)
VLDLC SERPL CALC-MCNC: 16 MG/DL
WBC # BLD AUTO: 5.6 K/UL (ref 4–11)

## 2025-06-30 PROCEDURE — 36415 COLL VENOUS BLD VENIPUNCTURE: CPT | Performed by: INTERNAL MEDICINE

## 2025-07-01 ENCOUNTER — RESULTS FOLLOW-UP (OUTPATIENT)
Dept: CARDIOLOGY CLINIC | Age: 70
End: 2025-07-01

## 2025-07-01 DIAGNOSIS — E78.5 HYPERLIPIDEMIA, UNSPECIFIED HYPERLIPIDEMIA TYPE: Primary | ICD-10-CM

## 2025-07-01 DIAGNOSIS — Z79.899 MEDICATION MANAGEMENT: ICD-10-CM

## 2025-07-01 RX ORDER — ROSUVASTATIN CALCIUM 20 MG/1
20 TABLET, COATED ORAL NIGHTLY
Qty: 90 TABLET | Refills: 1 | Status: SHIPPED | OUTPATIENT
Start: 2025-07-01

## 2025-07-02 ENCOUNTER — RESULTS FOLLOW-UP (OUTPATIENT)
Dept: FAMILY MEDICINE CLINIC | Age: 70
End: 2025-07-02

## 2025-07-02 ENCOUNTER — OFFICE VISIT (OUTPATIENT)
Dept: CARDIOLOGY CLINIC | Age: 70
End: 2025-07-02
Payer: MEDICARE

## 2025-07-02 VITALS
DIASTOLIC BLOOD PRESSURE: 80 MMHG | BODY MASS INDEX: 24.66 KG/M2 | HEIGHT: 65 IN | WEIGHT: 148 LBS | OXYGEN SATURATION: 95 % | HEART RATE: 52 BPM | SYSTOLIC BLOOD PRESSURE: 142 MMHG

## 2025-07-02 DIAGNOSIS — R00.1 SINUS BRADYCARDIA: ICD-10-CM

## 2025-07-02 DIAGNOSIS — I49.3 PVC (PREMATURE VENTRICULAR CONTRACTION): ICD-10-CM

## 2025-07-02 DIAGNOSIS — E78.00 HIGH CHOLESTEROL: ICD-10-CM

## 2025-07-02 DIAGNOSIS — I20.81 ANGINA PECTORIS WITH CORONARY MICROVASCULAR DYSFUNCTION: Primary | ICD-10-CM

## 2025-07-02 DIAGNOSIS — I10 ESSENTIAL HYPERTENSION: ICD-10-CM

## 2025-07-02 PROCEDURE — 1160F RVW MEDS BY RX/DR IN RCRD: CPT | Performed by: INTERNAL MEDICINE

## 2025-07-02 PROCEDURE — G8420 CALC BMI NORM PARAMETERS: HCPCS | Performed by: INTERNAL MEDICINE

## 2025-07-02 PROCEDURE — 93000 ELECTROCARDIOGRAM COMPLETE: CPT | Performed by: INTERNAL MEDICINE

## 2025-07-02 PROCEDURE — 3079F DIAST BP 80-89 MM HG: CPT | Performed by: INTERNAL MEDICINE

## 2025-07-02 PROCEDURE — 4004F PT TOBACCO SCREEN RCVD TLK: CPT | Performed by: INTERNAL MEDICINE

## 2025-07-02 PROCEDURE — 3017F COLORECTAL CA SCREEN DOC REV: CPT | Performed by: INTERNAL MEDICINE

## 2025-07-02 PROCEDURE — 3077F SYST BP >= 140 MM HG: CPT | Performed by: INTERNAL MEDICINE

## 2025-07-02 PROCEDURE — 1123F ACP DISCUSS/DSCN MKR DOCD: CPT | Performed by: INTERNAL MEDICINE

## 2025-07-02 PROCEDURE — 99214 OFFICE O/P EST MOD 30 MIN: CPT | Performed by: INTERNAL MEDICINE

## 2025-07-02 PROCEDURE — 1159F MED LIST DOCD IN RCRD: CPT | Performed by: INTERNAL MEDICINE

## 2025-07-02 PROCEDURE — G8427 DOCREV CUR MEDS BY ELIG CLIN: HCPCS | Performed by: INTERNAL MEDICINE

## 2025-07-02 NOTE — PATIENT INSTRUCTIONS
PLAN:  Labs reviewed in epic and discussed with patient.  Current medications reviewed.  Refills given as warranted.  You can call [phone] Quit Now for free resources to help you quit smoking.    Follow up in April-June . Call in January to schedule

## 2025-07-03 DIAGNOSIS — E78.5 HYPERLIPIDEMIA LDL GOAL <130: ICD-10-CM

## 2025-07-07 DIAGNOSIS — J41.0 SIMPLE CHRONIC BRONCHITIS (HCC): ICD-10-CM

## 2025-07-07 RX ORDER — PRAVASTATIN SODIUM 40 MG
40 TABLET ORAL DAILY
Qty: 90 TABLET | Refills: 3 | OUTPATIENT
Start: 2025-07-07

## 2025-07-08 RX ORDER — FLUTICASONE PROPIONATE AND SALMETEROL XINAFOATE 115; 21 UG/1; UG/1
AEROSOL, METERED RESPIRATORY (INHALATION)
Qty: 36 G | Refills: 3 | Status: SHIPPED | OUTPATIENT
Start: 2025-07-08

## 2025-07-08 NOTE — TELEPHONE ENCOUNTER
Refill Request     CONFIRM preferrred pharmacy with the patient.    If Mail Order Rx - Pend for 90 day refill.      Last Seen: Last Seen Department: 6/30/2025  Last Seen by PCP: 2/27/2025    Last Written:     If no future appointment scheduled, route STAFF MESSAGE with patient name to the  Pool for scheduling.      Next Appointment:   Future Appointments   Date Time Provider Department Center   7/23/2025  8:45 AM Risa Torres MD [facility]   9/4/2025  9:20 AM [place], Yas, APRN - CNP [facility]   2/2/2026 10:00 AM Glover, Eleanor, APRN - CNP [facility]       Message sent to  to schedule appt with patient?  NO      Requested Prescriptions     Pending Prescriptions Disp Refills    ADVAIR -21 MCG/ACT inhaler [Pharmacy Med Name: ADVAIR -21 MCG INHALER] 36 g      Sig: INHALE 2 PUFFS BY MOUTH 2 TIMES A DAY

## 2025-07-23 ENCOUNTER — OFFICE VISIT (OUTPATIENT)
Dept: ORTHOPEDIC SURGERY | Age: 70
End: 2025-07-23
Payer: MEDICARE

## 2025-07-23 VITALS — HEIGHT: 65 IN | BODY MASS INDEX: 24.66 KG/M2 | WEIGHT: 148 LBS

## 2025-07-23 DIAGNOSIS — M18.0 ARTHRITIS OF CARPOMETACARPAL (CMC) JOINT OF BOTH THUMBS: Primary | ICD-10-CM

## 2025-07-23 PROBLEM — M18.12 PRIMARY OSTEOARTHRITIS OF FIRST CARPOMETACARPAL JOINT OF LEFT HAND: Status: ACTIVE | Noted: 2025-07-23

## 2025-07-23 PROCEDURE — 4004F PT TOBACCO SCREEN RCVD TLK: CPT | Performed by: STUDENT IN AN ORGANIZED HEALTH CARE EDUCATION/TRAINING PROGRAM

## 2025-07-23 PROCEDURE — 1159F MED LIST DOCD IN RCRD: CPT | Performed by: STUDENT IN AN ORGANIZED HEALTH CARE EDUCATION/TRAINING PROGRAM

## 2025-07-23 PROCEDURE — G8427 DOCREV CUR MEDS BY ELIG CLIN: HCPCS | Performed by: STUDENT IN AN ORGANIZED HEALTH CARE EDUCATION/TRAINING PROGRAM

## 2025-07-23 PROCEDURE — 3017F COLORECTAL CA SCREEN DOC REV: CPT | Performed by: STUDENT IN AN ORGANIZED HEALTH CARE EDUCATION/TRAINING PROGRAM

## 2025-07-23 PROCEDURE — 1123F ACP DISCUSS/DSCN MKR DOCD: CPT | Performed by: STUDENT IN AN ORGANIZED HEALTH CARE EDUCATION/TRAINING PROGRAM

## 2025-07-23 PROCEDURE — 99214 OFFICE O/P EST MOD 30 MIN: CPT | Performed by: STUDENT IN AN ORGANIZED HEALTH CARE EDUCATION/TRAINING PROGRAM

## 2025-07-23 PROCEDURE — G8420 CALC BMI NORM PARAMETERS: HCPCS | Performed by: STUDENT IN AN ORGANIZED HEALTH CARE EDUCATION/TRAINING PROGRAM

## 2025-07-23 NOTE — PROGRESS NOTES

## 2025-07-23 NOTE — PROGRESS NOTES
Hand, Upper Extremity and Reconstructive Surgery                Risa Torres MD                                             History of Present Illness  7/23/2025-patient presents for follow-up of his bilateral thumb CMC osteoarthritis.  He had steroid injections about 6 weeks ago and only had a couple days of improvement.  He is eager for solution for his thumb pain.  He has been wearing his thumb braces.     History-  The patient is a 69-year-old right-hand dominant male presenting with bilateral thumb pain.  Symptoms began several months ago.  His left side is more severe than the right.  Denies any injury.  He reports radiating pain extending up the arm and a notable reduction in  strength, particularly when attempting to open a water bottle. There is no history of trauma or identifiable precipitating event. The patient has not sought prior medical evaluation for this condition and is not currently on anticoagulant therapy.      SOCIAL HISTORY  Occupations: [employer]     Current Outpatient Medications   Medication Instructions    albuterol sulfate HFA (PROVENTIL;VENTOLIN;PROAIR) 108 (90 Base) MCG/ACT inhaler 2 puffs, EVERY 6 HOURS PRN    aspirin 81 mg, DAILY    cetirizine (ZYRTEC) 10 mg, Oral, DAILY    fluticasone (FLONASE) 50 MCG/ACT nasal spray 1 spray, Each Nostril, DAILY    fluticasone-salmeterol (ADVAIR HFA) 115-21 MCG/ACT inhaler INHALE 2 PUFFS BY MOUTH 2 TIMES A DAY    hydroCHLOROthiazide (HYDRODIURIL) 25 MG tablet TAKE ONE TABLET BY MOUTH DAILY    isosorbide mononitrate (IMDUR) 30 mg, Oral, Nightly, TAKE ONE TABLET BY MOUTH DAILY    meloxicam (MOBIC) 7.5 mg, Oral, DAILY    Multiple Vitamin (MULTIVITAMIN ADULT PO) 1 tablet, DAILY    Multiple Vitamins-Minerals (EYE VITAMINS) CAPS 1 capsule, DAILY    niacin 500 mg, DAILY RESP    pantoprazole (PROTONIX) 40 mg, Oral, DAILY BEFORE BREAKFAST    rosuvastatin (CRESTOR) 20 mg, Oral, NIGHTLY       Past Medical History:

## 2025-07-25 ENCOUNTER — OFFICE VISIT (OUTPATIENT)
Dept: FAMILY MEDICINE CLINIC | Age: 70
End: 2025-07-25
Payer: MEDICARE

## 2025-07-25 VITALS
BODY MASS INDEX: 24.79 KG/M2 | RESPIRATION RATE: 12 BRPM | HEART RATE: 70 BPM | OXYGEN SATURATION: 97 % | DIASTOLIC BLOOD PRESSURE: 84 MMHG | SYSTOLIC BLOOD PRESSURE: 132 MMHG | TEMPERATURE: 97.8 F | WEIGHT: 149 LBS

## 2025-07-25 DIAGNOSIS — I25.119 ATHEROSCLEROSIS OF NATIVE CORONARY ARTERY OF NATIVE HEART WITH ANGINA PECTORIS: ICD-10-CM

## 2025-07-25 DIAGNOSIS — Z72.0 TOBACCO ABUSE: ICD-10-CM

## 2025-07-25 DIAGNOSIS — M18.12 PRIMARY OSTEOARTHRITIS OF FIRST CARPOMETACARPAL JOINT OF LEFT HAND: ICD-10-CM

## 2025-07-25 DIAGNOSIS — J44.9 CHRONIC OBSTRUCTIVE PULMONARY DISEASE, UNSPECIFIED COPD TYPE (HCC): ICD-10-CM

## 2025-07-25 DIAGNOSIS — Z01.818 PREOP EXAMINATION: Primary | ICD-10-CM

## 2025-07-25 DIAGNOSIS — G47.33 OSA (OBSTRUCTIVE SLEEP APNEA): ICD-10-CM

## 2025-07-25 PROCEDURE — 3079F DIAST BP 80-89 MM HG: CPT | Performed by: NURSE PRACTITIONER

## 2025-07-25 PROCEDURE — 1160F RVW MEDS BY RX/DR IN RCRD: CPT | Performed by: NURSE PRACTITIONER

## 2025-07-25 PROCEDURE — 4004F PT TOBACCO SCREEN RCVD TLK: CPT | Performed by: NURSE PRACTITIONER

## 2025-07-25 PROCEDURE — 99213 OFFICE O/P EST LOW 20 MIN: CPT | Performed by: NURSE PRACTITIONER

## 2025-07-25 PROCEDURE — 3017F COLORECTAL CA SCREEN DOC REV: CPT | Performed by: NURSE PRACTITIONER

## 2025-07-25 PROCEDURE — G8420 CALC BMI NORM PARAMETERS: HCPCS | Performed by: NURSE PRACTITIONER

## 2025-07-25 PROCEDURE — 3075F SYST BP GE 130 - 139MM HG: CPT | Performed by: NURSE PRACTITIONER

## 2025-07-25 PROCEDURE — 3023F SPIROM DOC REV: CPT | Performed by: NURSE PRACTITIONER

## 2025-07-25 PROCEDURE — 1159F MED LIST DOCD IN RCRD: CPT | Performed by: NURSE PRACTITIONER

## 2025-07-25 PROCEDURE — 1123F ACP DISCUSS/DSCN MKR DOCD: CPT | Performed by: NURSE PRACTITIONER

## 2025-07-25 PROCEDURE — G8427 DOCREV CUR MEDS BY ELIG CLIN: HCPCS | Performed by: NURSE PRACTITIONER

## 2025-07-25 ASSESSMENT — ENCOUNTER SYMPTOMS
CONSTIPATION: 0
COUGH: 0
CHEST TIGHTNESS: 0
BLOOD IN STOOL: 0
SHORTNESS OF BREATH: 0
DIARRHEA: 0

## 2025-07-25 NOTE — PROGRESS NOTES
Greyson Odom  YOB: 1955    Date of Service:  7/25/2025      Wt Readings from Last 2 Encounters:   07/23/25 67.1 kg (148 lb)   07/25/25 67.6 kg (149 lb)       BP Readings from Last 3 Encounters:   07/25/25 132/84   07/02/25 (!) 142/80   02/27/25 124/68        Chief Complaint   Patient presents with    Pre-op Exam     Left hand surgery by Dr Torres on  7/29 at [facility]        No Known Allergies    Outpatient Medications Marked as Taking for the 7/25/25 encounter (Office Visit) with Yas Jain APRN - CNP   Medication Sig Dispense Refill    fluticasone-salmeterol (ADVAIR HFA) 115-21 MCG/ACT inhaler INHALE 2 PUFFS BY MOUTH 2 TIMES A DAY 36 g 3    rosuvastatin (CRESTOR) 20 MG tablet Take 1 tablet by mouth nightly 90 tablet 1    cetirizine (ZYRTEC) 10 MG tablet Take 1 tablet by mouth daily 90 tablet 3    meloxicam (MOBIC) 15 MG tablet Take 0.5 tablets by mouth daily 30 tablet 0    isosorbide mononitrate (IMDUR) 30 MG extended release tablet Take 1 tablet by mouth at bedtime TAKE ONE TABLET BY MOUTH DAILY 90 tablet 3    hydroCHLOROthiazide (HYDRODIURIL) 25 MG tablet TAKE ONE TABLET BY MOUTH DAILY 90 tablet 3    pantoprazole (PROTONIX) 40 MG tablet Take 1 tablet by mouth every morning (before breakfast) 90 tablet 3    fluticasone (FLONASE) 50 MCG/ACT nasal spray 1 spray by Each Nostril route daily 32 g 1    albuterol sulfate HFA (PROVENTIL;VENTOLIN;PROAIR) 108 (90 Base) MCG/ACT inhaler INHALE 2 PUFFS BY MOUTH EVERY 6 HOURS AS NEEDED FOR WHEEZING 18 g 3    Multiple Vitamins-Minerals (EYE VITAMINS) CAPS Take 1 capsule by mouth Daily [facility] Vitamin (Lutein 10mg, Meso-Zeaxanthin 10mg, Zeaxanthin 2mg)      aspirin 81 MG EC tablet Take 1 tablet by mouth daily      Multiple Vitamin (MULTIVITAMIN ADULT PO) Take 1 tablet by mouth daily      niacin 500 MG tablet Take 1 tablet by mouth daily         This patient presents to the office todayfor a preoperative consultation at the request of surgeon,

## 2025-07-25 NOTE — PROGRESS NOTES
Pre Op Med History  Cold/Chronic Cough/Tuberculosis  --  no  Bronchitis/COPD  --  no  Asthma/SOB  --  yes, CPAP  Rheumatic Fever/Heart Murmur  --  no  High Blood Pressure/Low Blood Pressure  --  yes -   Swelling of Feet/Fluid In Lungs  --  no  Heart Attack/Chest Pain  --  no  Irregular, Fast Heartbeats  --  yes   Do you bruise easily?  --  no  Anemia  --  yes -   Diabetes/Low Blood Sugar  --  yes  Are you Pregnant  --  N/A  Last Menstrual Period  --  na  Serious Illness During Pregnancy  --  N/A  Breast Disease  --  no  Kidney Disease  --  no  Jaundice/Hepatitis  --  no  Hiatal Hernia/Peptic Ulcer Disease  --  no  Convulsions/Epilepsy/Stroke  --  no  Polio/Meningitis Paralysis  --  no  Back Pain/ Slipped Disc  --  no  Psychological Disease  --  no  Thyroid Disease  --  no  Glaucoma/Visual Impairment  --  no  Skeletal Deformities/Defects/Arthritis --  yes -   Loose Teeth/Caps on Front Teeth  --  no  Have you had any Surgery  --  yes -   Any History of anesthesia complication in self or family  -- no  Prostate Disease  --  yes  Cancer  --  yes - prostate  DVT/PE/PVD --  no  Weight Loss/Gain  --  no

## 2025-07-25 NOTE — PATIENT INSTRUCTIONS
Stop Aspirin 5 days prior to surgery  Stop Meloxicam  Stop Multivitamins    Take AM of surgery  Advair, Pantoprazole, Isosorbide

## 2025-07-28 ENCOUNTER — ANESTHESIA EVENT (OUTPATIENT)
Dept: OPERATING ROOM | Age: 70
End: 2025-07-28
Payer: MEDICARE

## 2025-07-29 ENCOUNTER — APPOINTMENT (OUTPATIENT)
Dept: GENERAL RADIOLOGY | Age: 70
End: 2025-07-29
Attending: STUDENT IN AN ORGANIZED HEALTH CARE EDUCATION/TRAINING PROGRAM
Payer: MEDICARE

## 2025-07-29 ENCOUNTER — ANESTHESIA (OUTPATIENT)
Dept: OPERATING ROOM | Age: 70
End: 2025-07-29
Payer: MEDICARE

## 2025-07-29 ENCOUNTER — HOSPITAL ENCOUNTER (OUTPATIENT)
Age: 70
Setting detail: OUTPATIENT SURGERY
Discharge: HOME | End: 2025-07-29
Attending: STUDENT IN AN ORGANIZED HEALTH CARE EDUCATION/TRAINING PROGRAM | Admitting: STUDENT IN AN ORGANIZED HEALTH CARE EDUCATION/TRAINING PROGRAM
Payer: MEDICARE

## 2025-07-29 VITALS
RESPIRATION RATE: 14 BRPM | SYSTOLIC BLOOD PRESSURE: 122 MMHG | BODY MASS INDEX: 24.66 KG/M2 | HEIGHT: 65 IN | DIASTOLIC BLOOD PRESSURE: 72 MMHG | WEIGHT: 148 LBS | HEART RATE: 60 BPM | OXYGEN SATURATION: 95 % | TEMPERATURE: 97.2 F

## 2025-07-29 DIAGNOSIS — M18.12 PRIMARY OSTEOARTHRITIS OF FIRST CARPOMETACARPAL JOINT OF LEFT HAND: Primary | ICD-10-CM

## 2025-07-29 PROCEDURE — 2500000003 HC RX 250 WO HCPCS: Performed by: ANESTHESIOLOGY

## 2025-07-29 PROCEDURE — 2709999900 HC NON-CHARGEABLE SUPPLY: Performed by: STUDENT IN AN ORGANIZED HEALTH CARE EDUCATION/TRAINING PROGRAM

## 2025-07-29 PROCEDURE — 3700000001 HC ADD 15 MINUTES (ANESTHESIA): Performed by: STUDENT IN AN ORGANIZED HEALTH CARE EDUCATION/TRAINING PROGRAM

## 2025-07-29 PROCEDURE — 3600000012 HC SURGERY LEVEL 2 ADDTL 15MIN: Performed by: STUDENT IN AN ORGANIZED HEALTH CARE EDUCATION/TRAINING PROGRAM

## 2025-07-29 PROCEDURE — 2580000003 HC RX 258: Performed by: STUDENT IN AN ORGANIZED HEALTH CARE EDUCATION/TRAINING PROGRAM

## 2025-07-29 PROCEDURE — 2580000003 HC RX 258: Performed by: ANESTHESIOLOGY

## 2025-07-29 PROCEDURE — 7100000010 HC PHASE II RECOVERY - FIRST 15 MIN: Performed by: STUDENT IN AN ORGANIZED HEALTH CARE EDUCATION/TRAINING PROGRAM

## 2025-07-29 PROCEDURE — 7100000011 HC PHASE II RECOVERY - ADDTL 15 MIN: Performed by: STUDENT IN AN ORGANIZED HEALTH CARE EDUCATION/TRAINING PROGRAM

## 2025-07-29 PROCEDURE — C1776 JOINT DEVICE (IMPLANTABLE): HCPCS | Performed by: STUDENT IN AN ORGANIZED HEALTH CARE EDUCATION/TRAINING PROGRAM

## 2025-07-29 PROCEDURE — 6360000002 HC RX W HCPCS: Performed by: NURSE ANESTHETIST, CERTIFIED REGISTERED

## 2025-07-29 PROCEDURE — 3700000000 HC ANESTHESIA ATTENDED CARE: Performed by: STUDENT IN AN ORGANIZED HEALTH CARE EDUCATION/TRAINING PROGRAM

## 2025-07-29 PROCEDURE — 6360000002 HC RX W HCPCS: Performed by: ANESTHESIOLOGY

## 2025-07-29 PROCEDURE — 6370000000 HC RX 637 (ALT 250 FOR IP): Performed by: ANESTHESIOLOGY

## 2025-07-29 PROCEDURE — 64415 NJX AA&/STRD BRCH PLXS IMG: CPT | Performed by: ANESTHESIOLOGY

## 2025-07-29 PROCEDURE — 2500000003 HC RX 250 WO HCPCS: Performed by: STUDENT IN AN ORGANIZED HEALTH CARE EDUCATION/TRAINING PROGRAM

## 2025-07-29 PROCEDURE — 6360000002 HC RX W HCPCS: Performed by: STUDENT IN AN ORGANIZED HEALTH CARE EDUCATION/TRAINING PROGRAM

## 2025-07-29 PROCEDURE — 3600000002 HC SURGERY LEVEL 2 BASE: Performed by: STUDENT IN AN ORGANIZED HEALTH CARE EDUCATION/TRAINING PROGRAM

## 2025-07-29 DEVICE — H/W INTERNALBRACE LGMNT AUGMNT REPR KIT
Type: IMPLANTABLE DEVICE | Site: HAND | Status: FUNCTIONAL
Brand: ARTHREX®

## 2025-07-29 RX ORDER — MAGNESIUM HYDROXIDE 1200 MG/15ML
LIQUID ORAL CONTINUOUS PRN
Status: COMPLETED | OUTPATIENT
Start: 2025-07-29 | End: 2025-07-29

## 2025-07-29 RX ORDER — SODIUM CHLORIDE 0.9 % (FLUSH) 0.9 %
5-40 SYRINGE (ML) INJECTION PRN
Status: DISCONTINUED | OUTPATIENT
Start: 2025-07-29 | End: 2025-07-29 | Stop reason: HOSPADM

## 2025-07-29 RX ORDER — OXYCODONE HYDROCHLORIDE 5 MG/1
5 TABLET ORAL PRN
Status: DISCONTINUED | OUTPATIENT
Start: 2025-07-29 | End: 2025-07-29 | Stop reason: HOSPADM

## 2025-07-29 RX ORDER — SODIUM CHLORIDE, SODIUM LACTATE, POTASSIUM CHLORIDE, CALCIUM CHLORIDE 600; 310; 30; 20 MG/100ML; MG/100ML; MG/100ML; MG/100ML
INJECTION, SOLUTION INTRAVENOUS CONTINUOUS
Status: DISCONTINUED | OUTPATIENT
Start: 2025-07-29 | End: 2025-07-29 | Stop reason: HOSPADM

## 2025-07-29 RX ORDER — ONDANSETRON 2 MG/ML
4 INJECTION INTRAMUSCULAR; INTRAVENOUS
Status: DISCONTINUED | OUTPATIENT
Start: 2025-07-29 | End: 2025-07-29 | Stop reason: HOSPADM

## 2025-07-29 RX ORDER — OXYCODONE HYDROCHLORIDE 5 MG/1
10 TABLET ORAL PRN
Status: DISCONTINUED | OUTPATIENT
Start: 2025-07-29 | End: 2025-07-29 | Stop reason: HOSPADM

## 2025-07-29 RX ORDER — SODIUM CHLORIDE 9 MG/ML
INJECTION, SOLUTION INTRAVENOUS PRN
Status: DISCONTINUED | OUTPATIENT
Start: 2025-07-29 | End: 2025-07-29 | Stop reason: HOSPADM

## 2025-07-29 RX ORDER — PROPOFOL 10 MG/ML
INJECTION, EMULSION INTRAVENOUS
Status: DISCONTINUED | OUTPATIENT
Start: 2025-07-29 | End: 2025-07-29 | Stop reason: SDUPTHER

## 2025-07-29 RX ORDER — MEPERIDINE HYDROCHLORIDE 25 MG/ML
12.5 INJECTION INTRAMUSCULAR; INTRAVENOUS; SUBCUTANEOUS EVERY 5 MIN PRN
Status: DISCONTINUED | OUTPATIENT
Start: 2025-07-29 | End: 2025-07-29 | Stop reason: HOSPADM

## 2025-07-29 RX ORDER — MIDAZOLAM HYDROCHLORIDE 1 MG/ML
INJECTION, SOLUTION INTRAMUSCULAR; INTRAVENOUS
Status: COMPLETED | OUTPATIENT
Start: 2025-07-29 | End: 2025-07-29

## 2025-07-29 RX ORDER — OXYCODONE AND ACETAMINOPHEN 5; 325 MG/1; MG/1
1 TABLET ORAL EVERY 6 HOURS PRN
Qty: 20 TABLET | Refills: 0 | Status: SHIPPED | OUTPATIENT
Start: 2025-07-29 | End: 2025-08-05

## 2025-07-29 RX ORDER — SODIUM CHLORIDE 0.9 % (FLUSH) 0.9 %
5-40 SYRINGE (ML) INJECTION EVERY 12 HOURS SCHEDULED
Status: DISCONTINUED | OUTPATIENT
Start: 2025-07-29 | End: 2025-07-29 | Stop reason: HOSPADM

## 2025-07-29 RX ORDER — MIDAZOLAM HYDROCHLORIDE 1 MG/ML
INJECTION, SOLUTION INTRAMUSCULAR; INTRAVENOUS
Status: COMPLETED
Start: 2025-07-29 | End: 2025-07-29

## 2025-07-29 RX ORDER — IPRATROPIUM BROMIDE AND ALBUTEROL SULFATE 2.5; .5 MG/3ML; MG/3ML
1 SOLUTION RESPIRATORY (INHALATION) ONCE
Status: COMPLETED | OUTPATIENT
Start: 2025-07-29 | End: 2025-07-29

## 2025-07-29 RX ORDER — BUPIVACAINE HYDROCHLORIDE 5 MG/ML
INJECTION, SOLUTION EPIDURAL; INTRACAUDAL; PERINEURAL
Status: COMPLETED | OUTPATIENT
Start: 2025-07-29 | End: 2025-07-29

## 2025-07-29 RX ORDER — LIDOCAINE HYDROCHLORIDE 20 MG/ML
INJECTION, SOLUTION INFILTRATION; PERINEURAL
Status: DISCONTINUED | OUTPATIENT
Start: 2025-07-29 | End: 2025-07-29 | Stop reason: SDUPTHER

## 2025-07-29 RX ADMIN — PHENYLEPHRINE HYDROCHLORIDE 80 MCG: 10 INJECTION INTRAVENOUS at 10:29

## 2025-07-29 RX ADMIN — FAMOTIDINE 20 MG: 10 INJECTION, SOLUTION INTRAVENOUS at 08:26

## 2025-07-29 RX ADMIN — PHENYLEPHRINE HYDROCHLORIDE 80 MCG: 10 INJECTION INTRAVENOUS at 09:38

## 2025-07-29 RX ADMIN — PROPOFOL 150 MCG/KG/MIN: 10 INJECTION, EMULSION INTRAVENOUS at 09:21

## 2025-07-29 RX ADMIN — LIDOCAINE HYDROCHLORIDE 50 MG: 20 INJECTION, SOLUTION INFILTRATION; PERINEURAL at 09:20

## 2025-07-29 RX ADMIN — IPRATROPIUM BROMIDE AND ALBUTEROL SULFATE 1 DOSE: 2.5; .5 SOLUTION RESPIRATORY (INHALATION) at 08:24

## 2025-07-29 RX ADMIN — MIDAZOLAM 2 MG: 1 INJECTION INTRAMUSCULAR; INTRAVENOUS at 08:42

## 2025-07-29 RX ADMIN — PROPOFOL 50 MG: 10 INJECTION, EMULSION INTRAVENOUS at 09:20

## 2025-07-29 RX ADMIN — PHENYLEPHRINE HYDROCHLORIDE 80 MCG: 10 INJECTION INTRAVENOUS at 09:59

## 2025-07-29 RX ADMIN — SODIUM CHLORIDE, SODIUM LACTATE, POTASSIUM CHLORIDE, AND CALCIUM CHLORIDE: .6; .31; .03; .02 INJECTION, SOLUTION INTRAVENOUS at 08:26

## 2025-07-29 RX ADMIN — BUPIVACAINE HYDROCHLORIDE 25 ML: 5 INJECTION, SOLUTION EPIDURAL; INTRACAUDAL; PERINEURAL at 08:42

## 2025-07-29 RX ADMIN — SODIUM CHLORIDE 2000 MG: 9 INJECTION, SOLUTION INTRAVENOUS at 09:22

## 2025-07-29 RX ADMIN — PHENYLEPHRINE HYDROCHLORIDE 80 MCG: 10 INJECTION INTRAVENOUS at 10:13

## 2025-07-29 RX ADMIN — PHENYLEPHRINE HYDROCHLORIDE 80 MCG: 10 INJECTION INTRAVENOUS at 09:49

## 2025-07-29 RX ADMIN — PHENYLEPHRINE HYDROCHLORIDE 120 MCG: 10 INJECTION INTRAVENOUS at 09:31

## 2025-07-29 ASSESSMENT — PAIN - FUNCTIONAL ASSESSMENT
PAIN_FUNCTIONAL_ASSESSMENT: 0-10
PAIN_FUNCTIONAL_ASSESSMENT: NONE - DENIES PAIN
PAIN_FUNCTIONAL_ASSESSMENT: 0-10
PAIN_FUNCTIONAL_ASSESSMENT: WONG-BAKER FACES

## 2025-07-29 ASSESSMENT — PAIN SCALES - GENERAL
PAINLEVEL_OUTOF10: 0
PAINLEVEL_OUTOF10: 0

## 2025-07-29 ASSESSMENT — ENCOUNTER SYMPTOMS: SHORTNESS OF BREATH: 1

## 2025-07-29 ASSESSMENT — LIFESTYLE VARIABLES: SMOKING_STATUS: 1

## 2025-07-29 NOTE — ANESTHESIA PRE PROCEDURE
Department of Anesthesiology  Preprocedure Note       Name:  Greyson Odom   Age:  69 y.o.  :  1955                                          MRN:  [id number]         Date:  2025      Surgeon: Surgeon(s):  Risa Torres MD    Procedure: Procedure(s):  LEFT TRAPEZIECTOMY AND SUSPENSIONPLASTY --BLOCK--    Medications prior to admission:   Prior to Admission medications    Medication Sig Start Date End Date Taking? Authorizing Provider   fluticasone-salmeterol (ADVAIR HFA) 115-21 MCG/ACT inhaler INHALE 2 PUFFS BY MOUTH 2 TIMES A DAY 25  Yes Cristian Poole APRN - CNP   rosuvastatin (CRESTOR) 20 MG tablet Take 1 tablet by mouth nightly 25  Yes Raffi Mathis MD   cetirizine (ZYRTEC) 10 MG tablet Take 1 tablet by mouth daily 25 Yes Yas Jain APRN - CNP   meloxicam (MOBIC) 15 MG tablet Take 0.5 tablets by mouth daily 3/11/25  Yes Risa Torres MD   isosorbide mononitrate (IMDUR) 30 MG extended release tablet Take 1 tablet by mouth at bedtime TAKE ONE TABLET BY MOUTH DAILY 25  Yes Yas Jain APRN - CNP   hydroCHLOROthiazide (HYDRODIURIL) 25 MG tablet TAKE ONE TABLET BY MOUTH DAILY 25  Yes Yas Jain APRN - CNP   pantoprazole (PROTONIX) 40 MG tablet Take 1 tablet by mouth every morning (before breakfast) 25  Yes Yas Jain APRN - CNP   fluticasone (FLONASE) 50 MCG/ACT nasal spray 1 spray by Each Nostril route daily 25  Yes Yas Jain APRN - CNP   albuterol sulfate HFA (PROVENTIL;VENTOLIN;PROAIR) 108 (90 Base) MCG/ACT inhaler INHALE 2 PUFFS BY MOUTH EVERY 6 HOURS AS NEEDED FOR WHEEZING 25  Yes Yas Jain APRN - CNP   Multiple Vitamins-Minerals (EYE VITAMINS) CAPS Take 1 capsule by mouth Daily [facility] Vitamin (Lutein 10mg, Meso-Zeaxanthin 10mg, Zeaxanthin 2mg)   Yes Provider, Historical, MD   aspirin 81 MG EC tablet Take 1 tablet by mouth daily   Yes Provider, Historical, MD   Multiple Vitamin 
(0) independent

## 2025-07-29 NOTE — ANESTHESIA POSTPROCEDURE EVALUATION
Department of Anesthesiology  Postprocedure Note    Patient: Greyson Odom  MRN: [id number]  YOB: 1955  Date of evaluation: 7/29/2025    Procedure Summary       Date: 07/29/25 Room / Location: [address]    Anesthesia Start: 0912 Anesthesia Stop: 1046    Procedure: LEFT TRAPEZIECTOMY AND SUSPENSIONPLASTY --BLOCK-- (Left) Diagnosis:       Primary osteoarthritis of first carpometacarpal joint of left hand      (Primary osteoarthritis of first carpometacarpal joint of left hand [M18.12])    Surgeons: Risa Torres MD Responsible Provider: John Parra MD    Anesthesia Type: TIVA, general ASA Status: 3            Anesthesia Type: No value filed.    Jair Phase I: Jair Score: 10    Jair Phase II: Jair Score: 10    Anesthesia Post Evaluation    Patient location during evaluation: bedside  Patient participation: complete - patient participated  Level of consciousness: awake and alert  Airway patency: patent  Nausea & Vomiting: no nausea  Cardiovascular status: hemodynamically stable  Respiratory status: acceptable  Hydration status: euvolemic  Pain management: adequate      Vitals:    07/29/25 1105 07/29/25 1112 07/29/25 1135 07/29/25 1157   BP: 99/65 132/78 130/74 122/72   Pulse: 55 58 60 60   Resp: 14 14 14 14   Temp:   97 °F (36.1 °C) 97.2 °F (36.2 °C)   TempSrc:       SpO2: 92% 93% 95% 95%   Weight:       Height:          No notable events documented.

## 2025-07-29 NOTE — ANESTHESIA PROCEDURE NOTES
Peripheral Block    Patient location during procedure: pre-op  Reason for block: post-op pain management and at surgeon's request  Start time: 7/29/2025 8:42 AM  End time: 7/29/2025 8:45 AM  Staffing  Performed: resident/CRNA   Anesthesiologist: John Parra MD  Resident/CRNA: True Herring APRN - CRNA  Performed by: True Herring APRN - CRNA  Authorized by: John Parra MD    Preanesthetic Checklist  Completed: patient identified, IV checked, site marked, risks and benefits discussed, surgical/procedural consents, equipment checked, pre-op evaluation, timeout performed, anesthesia consent given, oxygen available, monitors applied/VS acknowledged, fire risk safety assessment completed and verbalized and blood product R/B/A discussed and consented  Peripheral Block   Patient position: supine  Prep: ChloraPrep  Provider prep: mask and sterile gloves  Patient monitoring: continuous pulse ox, frequent blood pressure checks, IV access, oxygen and responsive to questions  Block type: Brachial plexus  Supraclavicular  Laterality: left  Injection technique: single-shot  Guidance: ultrasound guided  Local infiltration: lidocaine  Infiltration strength: 2 %  Local infiltration: lidocaine  Dose: 3 mL    Needle   Needle type: insulated echogenic nerve stimulator needle   Needle gauge: 21 G  Needle localization: ultrasound guidance  Needle length: 8 cm  Assessment   Injection assessment: negative aspiration for heme, no paresthesia on injection, local visualized surrounding nerve on ultrasound and no intravascular symptoms  Paresthesia pain: none  Slow fractionated injection: yes  Hemodynamics: stable  Outcomes: uncomplicated and patient tolerated procedure well    Additional Notes  Time out at 0841.   Medications Administered  midazolam (VERSED) injection 2 mg/2mL - IntraVENous   2 mg - 7/29/2025 8:42:00 AM  BUPivacaine (MARCAINE) PF injection 0.5% - Perineural   25 mL - 7/29/2025 8:42:00 AM

## 2025-07-29 NOTE — OP NOTE
OPERATIVE NOTE     Patient Name: Greyson Odom   YOB: 1955  MRN:  [id number]    Date of Procedure:  7/29/2025  Surgeon: Risa Torres MD    PRE-OPERATIVE DIAGNOSIS: Left thumb carpometacarpal arthritis     POST-OPERATIVE DIAGNOSIS:  Left thumb carpometacarpal arthritis     PROCEDURE:     Left trapeziectomy and suspensionplasty with Arthrex internal brace  LeftPartial trapezoidectomy    ANESTHESIA: [facility]    OPERATIVE INDICATIONS: The patient is a very pleasant 69 y.o. male who has a history of CMC arthritis who has failed conservative management.  The patient has significant pain that is disrupting their activities of daily living and quality of life.  He desires surgical treatment for their CMC arthritis this time. Surgical procedure of CMC arthroplasty including trapeziectomy and suspensionplasty was discussed with the patient.  Risks of the surgery were discussed including nerve injury, persistent pain, need for revision, bleeding, infection, pain and prolonged immobilization.  The patient expressed understanding of this and agrees to proceed with surgery.    OPERATIVE PROCEDURE: The patient was seen in the preoperative holding area. The operative procedure confirmed and the operative site was marked with an indelible marker. A block was then performed by the anesthesiology team.  The patient was brought to the operating room and placed supine on the table. A hand table was placed on the patient's side.  A tourniquet was placed on the patient's upper arm.  Next, the arm was prepped and draped in a sterile fashion.  A timeout was performed which correctly identified the team members, the procedure to be performed as well as the operative site.      The arm was exsanguinated using an Esmarch bandage and then tourniquet was inflated to 250 mmHg.  Incision was designed over the dorsal aspect of the thumb spanning from the base of the metacarpal to the proximal

## 2025-07-29 NOTE — DISCHARGE INSTRUCTIONS
medicine will be less effective. It is better to treat pain in advance than to try and catch up.     3. General Anesthesia:   If you did not receive a nerve block during your surgery, you will need to start taking your pain medication shortly after your surgery and should continue to do so as prescribed by your surgeon.      ICE AND ELEVATION  You may use ice for the first 48-72 hours, but it is not critical.      2. Motion of your fingers is very important s to decrease the swelling. Please follow the finger range of motion exercises below to assist you in regaining your motion. This should be done at least 10 repetitions 3-4 times a day.     3. Elevation, as much as possible for the next 48 hours, is critical for decreasing swelling as well as for pain relief. Elevation means when you are seated or lying down, you hand should be at or above your heart. When walking, the hand needs to be at or above the level of your elbow.     4. If the bandage gets too tight, it may need to be loosened. Please contact our office and we will instruct you in how to do this.     SURGICAL BANDAGES:     Your bandages will be removed at your first postoperative visit.   You have a splint on your hand or arm after surgery. Please follow the instructions for splint care below.    SPLINT INSTRUCTIONS:    Do not stick anything inside your cast or splint to scratch.  Using a  or sharp object to scratch under a cast can be harmful and irritating to the skin.  This can cause an infection with long-term problems.  Never get your cast or splint wet.  If you have any type of incision, sores or pins under the cast this can lead to an infection.  Cover your splint with a plastic bag before showering and seal the bag with tape  Cast protectors for showering are available in our office as well as most medical supply [employer] and some pharmacies.  3. If your cast or splint gets wet call our office.  4. Casts are never completely comfortable

## 2025-08-12 ENCOUNTER — HOSPITAL ENCOUNTER (OUTPATIENT)
Dept: OCCUPATIONAL THERAPY | Age: 70
Setting detail: THERAPIES SERIES
Discharge: HOME | End: 2025-08-12
Payer: MEDICARE

## 2025-08-12 ENCOUNTER — OFFICE VISIT (OUTPATIENT)
Dept: ORTHOPEDIC SURGERY | Age: 70
End: 2025-08-12

## 2025-08-12 VITALS — BODY MASS INDEX: 24.66 KG/M2 | WEIGHT: 148 LBS | HEIGHT: 65 IN

## 2025-08-12 DIAGNOSIS — M18.12 PRIMARY OSTEOARTHRITIS OF FIRST CARPOMETACARPAL JOINT OF LEFT HAND: Primary | ICD-10-CM

## 2025-08-12 PROCEDURE — L3808 WHFO, RIGID W/O JOINTS: HCPCS | Performed by: OCCUPATIONAL THERAPIST

## 2025-08-19 ENCOUNTER — HOSPITAL ENCOUNTER (OUTPATIENT)
Dept: OCCUPATIONAL THERAPY | Age: 70
Setting detail: THERAPIES SERIES
Discharge: HOME | End: 2025-08-19
Payer: MEDICARE

## 2025-08-19 PROCEDURE — 97014 ELECTRIC STIMULATION THERAPY: CPT | Performed by: OCCUPATIONAL THERAPIST

## 2025-08-19 PROCEDURE — 97165 OT EVAL LOW COMPLEX 30 MIN: CPT | Performed by: OCCUPATIONAL THERAPIST

## 2025-08-19 PROCEDURE — 97112 NEUROMUSCULAR REEDUCATION: CPT | Performed by: OCCUPATIONAL THERAPIST

## 2025-08-25 ENCOUNTER — HOSPITAL ENCOUNTER (OUTPATIENT)
Dept: OCCUPATIONAL THERAPY | Age: 70
Setting detail: THERAPIES SERIES
Discharge: HOME | End: 2025-08-25
Payer: MEDICARE

## 2025-08-25 PROCEDURE — 97140 MANUAL THERAPY 1/> REGIONS: CPT | Performed by: OCCUPATIONAL THERAPIST

## 2025-08-25 PROCEDURE — 97014 ELECTRIC STIMULATION THERAPY: CPT | Performed by: OCCUPATIONAL THERAPIST

## 2025-08-25 PROCEDURE — 97110 THERAPEUTIC EXERCISES: CPT | Performed by: OCCUPATIONAL THERAPIST

## 2025-09-02 ENCOUNTER — HOSPITAL ENCOUNTER (OUTPATIENT)
Dept: OCCUPATIONAL THERAPY | Age: 70
Setting detail: THERAPIES SERIES
Discharge: HOME | End: 2025-09-02
Payer: MEDICARE

## 2025-09-02 PROCEDURE — 97018 PARAFFIN BATH THERAPY: CPT | Performed by: OCCUPATIONAL THERAPIST

## 2025-09-02 PROCEDURE — 97140 MANUAL THERAPY 1/> REGIONS: CPT | Performed by: OCCUPATIONAL THERAPIST

## 2025-09-02 PROCEDURE — 97110 THERAPEUTIC EXERCISES: CPT | Performed by: OCCUPATIONAL THERAPIST

## 2025-09-02 PROCEDURE — 97763 ORTHC/PROSTC MGMT SBSQ ENC: CPT | Performed by: OCCUPATIONAL THERAPIST

## 2025-09-03 SDOH — HEALTH STABILITY: PHYSICAL HEALTH: ON AVERAGE, HOW MANY MINUTES DO YOU ENGAGE IN EXERCISE AT THIS LEVEL?: 150+ MIN

## 2025-09-03 SDOH — HEALTH STABILITY: PHYSICAL HEALTH: ON AVERAGE, HOW MANY DAYS PER WEEK DO YOU ENGAGE IN MODERATE TO STRENUOUS EXERCISE (LIKE A BRISK WALK)?: 7 DAYS

## 2025-09-03 ASSESSMENT — PATIENT HEALTH QUESTIONNAIRE - PHQ9
1. LITTLE INTEREST OR PLEASURE IN DOING THINGS: NOT AT ALL
SUM OF ALL RESPONSES TO PHQ QUESTIONS 1-9: 0
2. FEELING DOWN, DEPRESSED OR HOPELESS: NOT AT ALL
SUM OF ALL RESPONSES TO PHQ QUESTIONS 1-9: 0

## 2025-09-03 ASSESSMENT — LIFESTYLE VARIABLES
HOW OFTEN DO YOU HAVE A DRINK CONTAINING ALCOHOL: MONTHLY OR LESS
HOW OFTEN DO YOU HAVE SIX OR MORE DRINKS ON ONE OCCASION: 1
HOW MANY STANDARD DRINKS CONTAINING ALCOHOL DO YOU HAVE ON A TYPICAL DAY: PATIENT DOES NOT DRINK
HOW OFTEN DO YOU HAVE A DRINK CONTAINING ALCOHOL: 2
HOW MANY STANDARD DRINKS CONTAINING ALCOHOL DO YOU HAVE ON A TYPICAL DAY: 0

## 2025-09-04 ENCOUNTER — OFFICE VISIT (OUTPATIENT)
Dept: FAMILY MEDICINE CLINIC | Age: 70
End: 2025-09-04
Payer: MEDICARE

## 2025-09-04 VITALS
WEIGHT: 146 LBS | HEART RATE: 75 BPM | DIASTOLIC BLOOD PRESSURE: 82 MMHG | OXYGEN SATURATION: 99 % | SYSTOLIC BLOOD PRESSURE: 138 MMHG | TEMPERATURE: 98.6 F | BODY MASS INDEX: 24.3 KG/M2

## 2025-09-04 DIAGNOSIS — I25.119 ATHEROSCLEROSIS OF NATIVE CORONARY ARTERY OF NATIVE HEART WITH ANGINA PECTORIS: ICD-10-CM

## 2025-09-04 DIAGNOSIS — E11.9 TYPE 2 DIABETES MELLITUS WITHOUT COMPLICATION, WITHOUT LONG-TERM CURRENT USE OF INSULIN (HCC): ICD-10-CM

## 2025-09-04 DIAGNOSIS — Z72.0 TOBACCO ABUSE: ICD-10-CM

## 2025-09-04 DIAGNOSIS — Z00.00 MEDICARE ANNUAL WELLNESS VISIT, SUBSEQUENT: Primary | ICD-10-CM

## 2025-09-04 DIAGNOSIS — J44.9 CHRONIC OBSTRUCTIVE PULMONARY DISEASE, UNSPECIFIED COPD TYPE (HCC): ICD-10-CM

## 2025-09-04 PROCEDURE — 1160F RVW MEDS BY RX/DR IN RCRD: CPT | Performed by: NURSE PRACTITIONER

## 2025-09-04 PROCEDURE — 36415 COLL VENOUS BLD VENIPUNCTURE: CPT | Performed by: NURSE PRACTITIONER

## 2025-09-04 PROCEDURE — 3075F SYST BP GE 130 - 139MM HG: CPT | Performed by: NURSE PRACTITIONER

## 2025-09-04 PROCEDURE — 1159F MED LIST DOCD IN RCRD: CPT | Performed by: NURSE PRACTITIONER

## 2025-09-04 PROCEDURE — 1123F ACP DISCUSS/DSCN MKR DOCD: CPT | Performed by: NURSE PRACTITIONER

## 2025-09-04 PROCEDURE — 3017F COLORECTAL CA SCREEN DOC REV: CPT | Performed by: NURSE PRACTITIONER

## 2025-09-04 PROCEDURE — 3044F HG A1C LEVEL LT 7.0%: CPT | Performed by: NURSE PRACTITIONER

## 2025-09-04 PROCEDURE — G0439 PPPS, SUBSEQ VISIT: HCPCS | Performed by: NURSE PRACTITIONER

## 2025-09-04 PROCEDURE — 3079F DIAST BP 80-89 MM HG: CPT | Performed by: NURSE PRACTITIONER

## 2025-09-04 RX ORDER — UMECLIDINIUM BROMIDE AND VILANTEROL TRIFENATATE 62.5; 25 UG/1; UG/1
1 POWDER RESPIRATORY (INHALATION) DAILY
Qty: 1 EACH | Refills: 5 | Status: SHIPPED | OUTPATIENT
Start: 2025-09-04

## 2025-09-04 ASSESSMENT — ENCOUNTER SYMPTOMS
CONSTIPATION: 0
FACIAL SWELLING: 0
SHORTNESS OF BREATH: 1
TROUBLE SWALLOWING: 0
COUGH: 1
CHEST TIGHTNESS: 0
DIARRHEA: 0
BLOOD IN STOOL: 0
SINUS PRESSURE: 0
SINUS PAIN: 0
SORE THROAT: 0
RHINORRHEA: 0

## 2025-09-05 LAB
ALBUMIN SERPL-MCNC: 4.9 G/DL (ref 3.4–5)
ALP SERPL-CCNC: 95 U/L (ref 40–129)
ALT SERPL-CCNC: 37 U/L (ref 10–40)
AST SERPL-CCNC: 27 U/L (ref 15–37)
BILIRUB DIRECT SERPL-MCNC: 0.2 MG/DL (ref 0–0.3)
BILIRUB INDIRECT SERPL-MCNC: 0.3 MG/DL (ref 0–1)
BILIRUB SERPL-MCNC: 0.5 MG/DL (ref 0–1)
CHOLEST SERPL-MCNC: 129 MG/DL (ref 0–199)
EST. AVERAGE GLUCOSE BLD GHB EST-MCNC: 137 MG/DL
HBA1C MFR BLD: 6.4 %
HDLC SERPL-MCNC: 42 MG/DL (ref 40–60)
LDLC SERPL CALC-MCNC: 65 MG/DL
PROT SERPL-MCNC: 7.5 G/DL (ref 6.4–8.2)
TRIGL SERPL-MCNC: 108 MG/DL (ref 0–150)
VLDLC SERPL CALC-MCNC: 22 MG/DL

## (undated) DEVICE — ELECTRODE,ECG,STRESS,FOAM,3PK: Brand: MEDLINE

## (undated) DEVICE — SUTURE MONOCRYL SZ 5-0 L18IN ABSRB UD L13MM P-3 3/8 CIR PRIM Y493G

## (undated) DEVICE — CONMED SCOPE SAVER BITE BLOCK, 20X27 MM: Brand: SCOPE SAVER

## (undated) DEVICE — STRL PENROSE DRAIN 18" X 1/4": Brand: CARDINAL HEALTH

## (undated) DEVICE — SUTURE VICRYL SZ 3-0 L18IN ABSRB UD L26MM SH 1/2 CIR J864D

## (undated) DEVICE — ENDOSCOPIC KIT 2 12 FT OP4 DE2 GWN SYR

## (undated) DEVICE — ELECTRODE,RADIOTRANSLUCENT,FOAM,3PK: Brand: MEDLINE

## (undated) DEVICE — APPLICATOR MEDICATED 26 CC SOLUTION HI LT ORNG CHLORAPREP

## (undated) DEVICE — GLOVE ORANGE PI 7 1/2   MSG9075

## (undated) DEVICE — GAUZE,SPONGE,4"X4",8PLY,STRL,LF,10/TRAY: Brand: MEDLINE

## (undated) DEVICE — FORCEPS BX L240CM JAW DIA2.8MM L CAP W/ NDL MIC MESH TOOTH

## (undated) DEVICE — CORD ES L12FT BPLR FRCP

## (undated) DEVICE — BANDAGE,ELASTIC,SOFTWRAP,STERILE,3"X5YD: Brand: MEDLINE

## (undated) DEVICE — FORCEP BX STD CAP 240CM RAD JAW 4

## (undated) DEVICE — Device

## (undated) DEVICE — SNARE ENDOSCP L240CM SHTH DIA24MM LOOP W10MM POLYP RND REINF

## (undated) DEVICE — DRAPE C ARM UNIV W41XL74IN CLR PLAS XR VELC CLSR POLY STRP

## (undated) DEVICE — ZIMMER® STERILE DISPOSABLE TOURNIQUET CUFF WITH PLC, DUAL PORT, SINGLE BLADDER, 18 IN. (46 CM)

## (undated) DEVICE — GLOVE SURG SZ 8 L12IN THK75MIL DK GRN LTX FREE

## (undated) DEVICE — SOLUTION IRRIG 500ML 0.9% SOD CHLO USP POUR PLAS BTL

## (undated) DEVICE — GOWN SIRUS NONREIN XL W/TWL: Brand: MEDLINE INDUSTRIES, INC.

## (undated) DEVICE — CANNULA NSL AD TBNG L7FT PVC STR NONFLARED PRNG O2 DEL W STD

## (undated) DEVICE — BANDAGE COMPR W3INXL5YD HI E BGE W/ CLP SURE-WRAP

## (undated) DEVICE — DRAPE,REIN 53X77,STERILE: Brand: MEDLINE

## (undated) DEVICE — ENDO CARRY-ON PROCEDURE KIT INCLUDES SUCTION TUBING, LUBRICANT, GAUZE, BIOHAZARD STICKER, TRANSPORT PAD AND INTERCEPT BEDSIDE KIT.: Brand: ENDO CARRY-ON PROCEDURE KIT

## (undated) DEVICE — 1010 S-DRAPE TOWEL DRAPE 10/BX: Brand: STERI-DRAPE™

## (undated) DEVICE — DRAPE HND W114XL142IN BLU POLYPR W O PCH FEN CRD AND TB HLDR

## (undated) DEVICE — BLADE OPHTH 180DEG CUT SURF BLU STR SHRP DBL BVL GRINDLESS

## (undated) DEVICE — TRAP SPEC POLYPR SGL CHMBR FN MESH SCRN

## (undated) DEVICE — TIP SUCT DIA12FR W STYL CTRL VENT DISPOSABLE FRAZ

## (undated) DEVICE — STRIP,CLOSURE,WOUND,MEDI-STRIP,1/2X4: Brand: MEDLINE